# Patient Record
Sex: MALE | Race: BLACK OR AFRICAN AMERICAN | NOT HISPANIC OR LATINO | Employment: FULL TIME | URBAN - METROPOLITAN AREA
[De-identification: names, ages, dates, MRNs, and addresses within clinical notes are randomized per-mention and may not be internally consistent; named-entity substitution may affect disease eponyms.]

---

## 2019-10-06 ENCOUNTER — HOSPITAL ENCOUNTER (EMERGENCY)
Facility: HOSPITAL | Age: 54
Discharge: HOME | End: 2019-10-06
Attending: EMERGENCY MEDICINE

## 2019-10-06 VITALS
HEART RATE: 80 BPM | TEMPERATURE: 98 F | WEIGHT: 185 LBS | HEIGHT: 70 IN | SYSTOLIC BLOOD PRESSURE: 170 MMHG | OXYGEN SATURATION: 97 % | RESPIRATION RATE: 19 BRPM | DIASTOLIC BLOOD PRESSURE: 87 MMHG | BODY MASS INDEX: 26.48 KG/M2

## 2019-10-06 DIAGNOSIS — M25.561 RIGHT KNEE PAIN: ICD-10-CM

## 2019-10-06 DIAGNOSIS — S89.91XA INJURY OF RIGHT KNEE, INITIAL ENCOUNTER: Primary | ICD-10-CM

## 2019-10-06 PROCEDURE — 99284 PR EMERGENCY DEPT VISIT,LEVEL IV: ICD-10-PCS | Mod: ,,, | Performed by: PHYSICIAN ASSISTANT

## 2019-10-06 PROCEDURE — 99284 EMERGENCY DEPT VISIT MOD MDM: CPT | Mod: ,,, | Performed by: PHYSICIAN ASSISTANT

## 2019-10-06 PROCEDURE — 25000003 PHARM REV CODE 250: Performed by: PHYSICIAN ASSISTANT

## 2019-10-06 PROCEDURE — 99283 EMERGENCY DEPT VISIT LOW MDM: CPT | Mod: 25

## 2019-10-06 RX ORDER — ACETAMINOPHEN 500 MG
1000 TABLET ORAL
Status: COMPLETED | OUTPATIENT
Start: 2019-10-06 | End: 2019-10-06

## 2019-10-06 RX ORDER — NAPROXEN 500 MG/1
500 TABLET ORAL 2 TIMES DAILY WITH MEALS
Qty: 20 TABLET | Refills: 0 | Status: SHIPPED | OUTPATIENT
Start: 2019-10-06 | End: 2022-08-18

## 2019-10-06 RX ORDER — IBUPROFEN 600 MG/1
600 TABLET ORAL
Status: COMPLETED | OUTPATIENT
Start: 2019-10-06 | End: 2019-10-06

## 2019-10-06 RX ADMIN — ACETAMINOPHEN 1000 MG: 500 TABLET ORAL at 09:10

## 2019-10-06 RX ADMIN — IBUPROFEN 600 MG: 600 TABLET, FILM COATED ORAL at 10:10

## 2019-10-07 NOTE — DISCHARGE INSTRUCTIONS
Use your crutches for ambulation assistance. Do not place weight on your leg until you are able to see Orthopedics  Use the below contact information to establish care  Take the prescribed Naprosyn for pain  Return to the emergency room for new, worsening, or concerning symptoms
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
## 2022-05-05 ENCOUNTER — TELEPHONE (OUTPATIENT)
Dept: INTERNAL MEDICINE | Facility: CLINIC | Age: 57
End: 2022-05-05
Payer: MEDICAID

## 2022-06-06 ENCOUNTER — OFFICE VISIT (OUTPATIENT)
Dept: INTERNAL MEDICINE | Facility: CLINIC | Age: 57
End: 2022-06-06
Payer: MEDICAID

## 2022-06-06 VITALS
WEIGHT: 209 LBS | TEMPERATURE: 98 F | HEART RATE: 111 BPM | BODY MASS INDEX: 29.92 KG/M2 | SYSTOLIC BLOOD PRESSURE: 138 MMHG | DIASTOLIC BLOOD PRESSURE: 86 MMHG | RESPIRATION RATE: 16 BRPM | HEIGHT: 70 IN

## 2022-06-06 DIAGNOSIS — F19.90 ILLICIT DRUG USE: ICD-10-CM

## 2022-06-06 DIAGNOSIS — I10 PRIMARY HYPERTENSION: Primary | ICD-10-CM

## 2022-06-06 DIAGNOSIS — Z12.5 PROSTATE CANCER SCREENING: ICD-10-CM

## 2022-06-06 DIAGNOSIS — R73.03 PREDIABETES: ICD-10-CM

## 2022-06-06 DIAGNOSIS — F17.210 CIGARETTE NICOTINE DEPENDENCE WITHOUT COMPLICATION: ICD-10-CM

## 2022-06-06 DIAGNOSIS — Z00.00 WELLNESS EXAMINATION: ICD-10-CM

## 2022-06-06 DIAGNOSIS — E55.9 VITAMIN D DEFICIENCY: ICD-10-CM

## 2022-06-06 PROBLEM — E66.9 OBESITY: Chronic | Status: ACTIVE | Noted: 2022-06-06

## 2022-06-06 PROBLEM — E66.9 OBESITY: Status: ACTIVE | Noted: 2022-06-06

## 2022-06-06 PROCEDURE — 99406 PR TOBACCO USE CESSATION INTERMEDIATE 3-10 MINUTES: ICD-10-PCS | Mod: S$PBB,,, | Performed by: NURSE PRACTITIONER

## 2022-06-06 PROCEDURE — 99204 OFFICE O/P NEW MOD 45 MIN: CPT | Mod: S$PBB,25,, | Performed by: NURSE PRACTITIONER

## 2022-06-06 PROCEDURE — 99214 OFFICE O/P EST MOD 30 MIN: CPT | Mod: PBBFAC | Performed by: NURSE PRACTITIONER

## 2022-06-06 PROCEDURE — 99406 BEHAV CHNG SMOKING 3-10 MIN: CPT | Mod: S$PBB,,, | Performed by: NURSE PRACTITIONER

## 2022-06-06 PROCEDURE — 99204 PR OFFICE/OUTPT VISIT, NEW, LEVL IV, 45-59 MIN: ICD-10-PCS | Mod: S$PBB,25,, | Performed by: NURSE PRACTITIONER

## 2022-06-06 RX ORDER — LISINOPRIL 20 MG/1
20 TABLET ORAL EVERY MORNING
COMMUNITY
Start: 2022-04-29 | End: 2022-06-06 | Stop reason: SDUPTHER

## 2022-06-06 RX ORDER — LISINOPRIL 20 MG/1
20 TABLET ORAL EVERY MORNING
Qty: 90 TABLET | Refills: 3 | Status: SHIPPED | OUTPATIENT
Start: 2022-06-06 | End: 2022-12-05 | Stop reason: SDUPTHER

## 2022-06-06 NOTE — PROGRESS NOTES
GENTRY Westbrook   [facility]  [address] [facility] [address]      PATIENT NAME: Xavier Harmon  : 1965  DATE: 22  MRN: [id number]      Billing Provider: GENTRY Westbrook  Level of Service:   Patient PCP Information     Provider PCP Type    GENTRY Westbrook [facility]          Reason for Visit / Chief Complaint: Follow-up (Out of med x 2 weeks)       History of Present Illness / Problem Focused Workflow     Xavier Harmon presents to the clinic with Follow-up (Out of med x 2 weeks)     55 yo male here today for f/u for lab review and BP check, LOV 22. PMH HTN, illicit drug use, Tobacco use (1/2 ppd x 30+ years).  Pt reports today he was unaware he was supposed to go back to the pharmacy to  his RX, has not had meds in about 1 -2 weeks, BP okay today, will restart meds today. Pt labs reviewed with no questions or concerns at this time, pt had concerns at our LOV regarding cysts on his back but reports today he will still wait until he has insurance to follow up for it. Denies any other concerns today, agreeable to 1 yr f/u.     Follow-up        Review of Systems     Review of Systems   Constitutional: Negative.    HENT: Negative.    Eyes: Negative.    Respiratory: Negative.    Cardiovascular: Negative.    Gastrointestinal: Negative.    Endocrine: Negative.    Genitourinary: Negative.    Neurological: Negative.    Psychiatric/Behavioral: Negative.        Medical / Social / Family History     Past Medical History:   Diagnosis Date    HTN (hypertension)     Obesity, unspecified     Tobacco abuse        History reviewed. No pertinent surgical history.    Social History    reports that he has been smoking cigarettes. He started smoking about 46 years ago. He has never used smokeless tobacco. He reports current alcohol use. He reports that he does not use drugs.    Family History  's family history includes Diabetes in  his mother; Hypertension in his mother; Stroke in his father.    Medications and Allergies     Medications  Medication List with Changes/Refills   Current Medications    NAPROXEN (NAPROSYN) 500 MG TABLET    Take 1 tablet (500 mg total) by mouth 2 (two) times daily with meals.   Changed and/or Refilled Medications    Modified Medication Previous Medication    LISINOPRIL (PRINIVIL,ZESTRIL) 20 MG TABLET lisinopriL (PRINIVIL,ZESTRIL) 20 MG tablet       Take 1 tablet (20 mg total) by mouth every morning. For High Blood Pressure    Take 20 mg by mouth every morning.         Allergies  Review of patient's allergies indicates:  No Known Allergies    Physical Examination     Vitals:    06/06/22 1303   BP: 138/86   Pulse: (!) 111   Resp: 16   Temp: 98.3 °F (36.8 °C)     Physical Exam  Vitals reviewed.   Constitutional:       Appearance: Normal appearance. He is normal weight.   HENT:      Head: Normocephalic.   Cardiovascular:      Rate and Rhythm: Normal rate and regular rhythm.      Pulses: Normal pulses.      Heart sounds: Normal heart sounds.   Pulmonary:      Effort: Pulmonary effort is normal.      Breath sounds: Normal breath sounds.   Abdominal:      General: Abdomen is flat.      Palpations: Abdomen is soft.   Musculoskeletal:         General: Normal range of motion.      Cervical back: Normal range of motion.   Skin:     General: Skin is warm and dry.   Neurological:      Mental Status: He is alert.   Psychiatric:         Mood and Affect: Mood normal.           Results     Lab Results   Component Value Date    WBC 7.8 07/08/2018    RBC 5.64 07/08/2018    HGB 12.8 (L) 07/08/2018    HCT 39.6 (L) 07/08/2018    MCV 70.2 (L) 07/08/2018    MCH 22.6 (L) 07/08/2018    MCHC 32.3 (L) 07/08/2018    RDW 16.0 07/08/2018     07/08/2018    MPV 9.0 07/08/2018     Sodium Level   Date Value Ref Range Status   07/08/2018 137 136 - 145 mmol/L Final     Potassium Level   Date Value Ref Range Status   07/08/2018 3.0 (L) 3.5 - 5.1  mmol/L Final     Carbon Dioxide   Date Value Ref Range Status   07/08/2018 25.6 21.0 - 32.0 mmol/L Final     Blood Urea Nitrogen   Date Value Ref Range Status   07/08/2018 9.8 7.0 - 18.0 mg/dL Final     Creatinine   Date Value Ref Range Status   07/08/2018 0.98 0.60 - 1.30 mg/dL Final     Calcium Level Total   Date Value Ref Range Status   07/08/2018 8.3 (L) 8.5 - 10.1 mg/dL Final     Albumin Level   Date Value Ref Range Status   07/08/2018 3.30 (L) 3.40 - 5.00 gm/dL Final     Bilirubin Total   Date Value Ref Range Status   07/08/2018 0.4 0.2 - 1.0 mg/dL Final     Alkaline Phosphatase   Date Value Ref Range Status   07/08/2018 115 46 - 116 unit/L Final     Aspartate Aminotransferase   Date Value Ref Range Status   07/08/2018 438 (H) 15 - 37 unit/L Final     Alanine Aminotransferase   Date Value Ref Range Status   07/08/2018 587 (H) 12 - 78 unit/L Final     Estimated GFR-Non    Date Value Ref Range Status   07/08/2018 >60 mL/min/1.73 m2 Final     No results found for: CHOL  No results found for: HDL  No results found for: LDLCALC  No results found for: TRIG  No results found for: CHOLHDL  No results found for: TSH  Lab Results   Component Value Date    PHUR 7.0 01/22/2021    PROTEINUA Trace (A) 01/22/2021    GLUCUA Negative 01/22/2021    KETONESU Trace (A) 01/22/2021    OCCULTUA Negative 01/22/2021    NITRITE Negative 01/22/2021    LEUKOCYTESUR Trace (A) 01/22/2021     No results found for: HGBA1C  No results found for: MICROALBUR, [id number]   No results found for this or any previous visit.         Assessment and Plan (including Health Maintenance)     Plan:         Health Maintenance Due   Topic Date Due    Hepatitis C Screening  Never done    Lipid Panel  Never done    Pneumococcal Vaccines (Age 0-64) (1 - PCV) Never done    HIV Screening  Never done    TETANUS VACCINE  Never done    Colorectal Cancer Screening  Never done    Shingles Vaccine (1 of 2) Never done    COVID-19 Vaccine (2 -  Moderna series) 09/19/2021       Problem List Items Addressed This Visit        Psychiatric    Illicit drug use       Cardiac/Vascular    Primary hypertension - Primary (Chronic)    Current Assessment & Plan     Continue medications as prescribed  Low Sodium Diet (Dash Diet - less than 2 grams of sodium per day).  Maintain healthy weight with goal BMI <30. Exercise 30 minutes per day 5 days per week.             Relevant Medications    lisinopriL (PRINIVIL,ZESTRIL) 20 MG tablet       Endocrine    Prediabetes    Current Assessment & Plan     Follow ADA diet.  Avoid soda, simple sweets, and limit rice/pasta/bread/starches and consume brown options when possible.   Maintain healthy weight with BMI goal <30.   Perform aerobic exercise for 150 minutes per week (or 5 days a week for 30 minutes each day).                 Other    Cigarette nicotine dependence without complication (Chronic)    Current Assessment & Plan     Smoking cessation discussed > 3 mins   Pt not ready to quit.  Discussed benefits of quitting including improved health, decreased cardiac/vascular/pulmonary/stroke risks as well as saving money.             Relevant Orders    Ambulatory referral/consult to Smoking Cessation Program      Other Visit Diagnoses     Wellness examination        Relevant Orders    CBC Auto Differential    Comprehensive Metabolic Panel    Lipid Panel    TSH    T4, Free    Vitamin D    Urinalysis, Reflex to Urine Culture Urine, Clean Catch    Hemoglobin A1C    Prostate cancer screening        Relevant Orders    PSA, Screening    Vitamin D deficiency        Relevant Orders    Vitamin D          Health Maintenance Topics with due status: Not Due       Topic Last Completion Date    Influenza Vaccine Not Due       No future appointments.         Signature:     [facility]  [address] [facility] [id number]    Date of encounter: 6/6/22

## 2022-06-06 NOTE — ASSESSMENT & PLAN NOTE
Continue medications as prescribed  Low Sodium Diet (Dash Diet - less than 2 grams of sodium per day).  Maintain healthy weight with goal BMI <30. Exercise 30 minutes per day 5 days per week.

## 2022-06-06 NOTE — ASSESSMENT & PLAN NOTE
Follow ADA diet.  Avoid soda, simple sweets, and limit rice/pasta/bread/starches and consume brown options when possible.   Maintain healthy weight with BMI goal <30.   Perform aerobic exercise for 150 minutes per week (or 5 days a week for 30 minutes each day).

## 2022-06-06 NOTE — ASSESSMENT & PLAN NOTE
Smoking cessation discussed > 3 mins   Pt not ready to quit.  Discussed benefits of quitting including improved health, decreased cardiac/vascular/pulmonary/stroke risks as well as saving money.

## 2022-07-11 ENCOUNTER — TELEPHONE (OUTPATIENT)
Dept: SMOKING CESSATION | Facility: CLINIC | Age: 57
End: 2022-07-11

## 2022-07-28 LAB
LEFT EYE DM RETINOPATHY: NEGATIVE
RIGHT EYE DM RETINOPATHY: NEGATIVE

## 2022-08-04 ENCOUNTER — TELEPHONE (OUTPATIENT)
Dept: SMOKING CESSATION | Facility: CLINIC | Age: 57
End: 2022-08-04

## 2022-08-04 NOTE — TELEPHONE ENCOUNTER
Pt had not shown up for his [facility] appointment.  Called pt.  Pt will not be able to make it today.  Pt stated that he will call back to reschedule.

## 2022-08-18 ENCOUNTER — HOSPITAL ENCOUNTER (OUTPATIENT)
Dept: RADIOLOGY | Facility: HOSPITAL | Age: 57
Discharge: HOME | End: 2022-08-18
Attending: NURSE PRACTITIONER
Payer: MEDICAID

## 2022-08-18 ENCOUNTER — OFFICE VISIT (OUTPATIENT)
Dept: INTERNAL MEDICINE | Facility: CLINIC | Age: 57
End: 2022-08-18
Payer: MEDICAID

## 2022-08-18 VITALS
HEART RATE: 79 BPM | SYSTOLIC BLOOD PRESSURE: 134 MMHG | HEIGHT: 70 IN | TEMPERATURE: 98 F | WEIGHT: 196 LBS | BODY MASS INDEX: 28.06 KG/M2 | DIASTOLIC BLOOD PRESSURE: 87 MMHG

## 2022-08-18 DIAGNOSIS — F17.210 CIGARETTE NICOTINE DEPENDENCE WITHOUT COMPLICATION: ICD-10-CM

## 2022-08-18 DIAGNOSIS — R76.8 HEPATITIS C ANTIBODY TEST POSITIVE: Primary | ICD-10-CM

## 2022-08-18 DIAGNOSIS — M25.561 CHRONIC PAIN OF RIGHT KNEE: ICD-10-CM

## 2022-08-18 DIAGNOSIS — G89.29 CHRONIC PAIN OF RIGHT KNEE: ICD-10-CM

## 2022-08-18 DIAGNOSIS — Z23 IMMUNIZATION DUE: ICD-10-CM

## 2022-08-18 DIAGNOSIS — N48.89 PENILE SWELLING: ICD-10-CM

## 2022-08-18 DIAGNOSIS — R73.03 PREDIABETES: ICD-10-CM

## 2022-08-18 DIAGNOSIS — R59.1 LYMPHADENOPATHY: ICD-10-CM

## 2022-08-18 DIAGNOSIS — Z12.11 COLON CANCER SCREENING: ICD-10-CM

## 2022-08-18 DIAGNOSIS — E55.9 VITAMIN D DEFICIENCY: Chronic | ICD-10-CM

## 2022-08-18 DIAGNOSIS — R22.2 MASS OF SKIN OF BACK: ICD-10-CM

## 2022-08-18 PROCEDURE — 73564 X-RAY EXAM KNEE 4 OR MORE: CPT | Mod: TC,RT

## 2022-08-18 PROCEDURE — 3008F PR BODY MASS INDEX (BMI) DOCUMENTED: ICD-10-PCS | Mod: CPTII,,, | Performed by: NURSE PRACTITIONER

## 2022-08-18 PROCEDURE — 1159F MED LIST DOCD IN RCRD: CPT | Mod: CPTII,,, | Performed by: NURSE PRACTITIONER

## 2022-08-18 PROCEDURE — 3079F DIAST BP 80-89 MM HG: CPT | Mod: CPTII,,, | Performed by: NURSE PRACTITIONER

## 2022-08-18 PROCEDURE — 3008F BODY MASS INDEX DOCD: CPT | Mod: CPTII,,, | Performed by: NURSE PRACTITIONER

## 2022-08-18 PROCEDURE — 99214 OFFICE O/P EST MOD 30 MIN: CPT | Mod: PBBFAC | Performed by: NURSE PRACTITIONER

## 2022-08-18 PROCEDURE — 3075F PR MOST RECENT SYSTOLIC BLOOD PRESS GE 130-139MM HG: ICD-10-PCS | Mod: CPTII,,, | Performed by: NURSE PRACTITIONER

## 2022-08-18 PROCEDURE — 1160F PR REVIEW ALL MEDS BY PRESCRIBER/CLIN PHARMACIST DOCUMENTED: ICD-10-PCS | Mod: CPTII,,, | Performed by: NURSE PRACTITIONER

## 2022-08-18 PROCEDURE — 90471 IMMUNIZATION ADMIN: CPT | Mod: PBBFAC

## 2022-08-18 PROCEDURE — 90715 TDAP VACCINE 7 YRS/> IM: CPT | Mod: PBBFAC

## 2022-08-18 PROCEDURE — 99215 OFFICE O/P EST HI 40 MIN: CPT | Mod: S$PBB,,, | Performed by: NURSE PRACTITIONER

## 2022-08-18 PROCEDURE — 99215 PR OFFICE/OUTPT VISIT, EST, LEVL V, 40-54 MIN: ICD-10-PCS | Mod: S$PBB,,, | Performed by: NURSE PRACTITIONER

## 2022-08-18 PROCEDURE — 1160F RVW MEDS BY RX/DR IN RCRD: CPT | Mod: CPTII,,, | Performed by: NURSE PRACTITIONER

## 2022-08-18 PROCEDURE — 3079F PR MOST RECENT DIASTOLIC BLOOD PRESSURE 80-89 MM HG: ICD-10-PCS | Mod: CPTII,,, | Performed by: NURSE PRACTITIONER

## 2022-08-18 PROCEDURE — 4010F ACE/ARB THERAPY RXD/TAKEN: CPT | Mod: CPTII,,, | Performed by: NURSE PRACTITIONER

## 2022-08-18 PROCEDURE — 3075F SYST BP GE 130 - 139MM HG: CPT | Mod: CPTII,,, | Performed by: NURSE PRACTITIONER

## 2022-08-18 PROCEDURE — 1159F PR MEDICATION LIST DOCUMENTED IN MEDICAL RECORD: ICD-10-PCS | Mod: CPTII,,, | Performed by: NURSE PRACTITIONER

## 2022-08-18 PROCEDURE — 4010F PR ACE/ARB THEARPY RXD/TAKEN: ICD-10-PCS | Mod: CPTII,,, | Performed by: NURSE PRACTITIONER

## 2022-08-18 RX ORDER — PREDNISONE 20 MG/1
20 TABLET ORAL 2 TIMES DAILY
Qty: 10 TABLET | Refills: 0 | Status: SHIPPED | OUTPATIENT
Start: 2022-08-18 | End: 2022-08-23

## 2022-08-18 RX ORDER — DICLOFENAC SODIUM 10 MG/G
2 GEL TOPICAL 4 TIMES DAILY
Qty: 100 G | Refills: 6 | Status: SHIPPED | OUTPATIENT
Start: 2022-08-18 | End: 2023-01-11 | Stop reason: SDUPTHER

## 2022-08-18 RX ADMIN — TETANUS TOXOID, REDUCED DIPHTHERIA TOXOID AND ACELLULAR PERTUSSIS VACCINE, ADSORBED 0.5 ML: 5; 2.5; 8; 8; 2.5 SUSPENSION INTRAMUSCULAR at 08:08

## 2022-08-18 NOTE — ASSESSMENT & PLAN NOTE
Smoking cessation discussed > 3 mins  LC Screening Ordered  Pt not ready to quit.  Discussed benefits of quitting including improved health, decreased cardiac/vascular/pulmonary/stroke risks as well as saving money.

## 2022-08-18 NOTE — ASSESSMENT & PLAN NOTE
Right Knee X-ray Today   RX Voltaren Gel prn   Perform knee stretches daily  Exercise as tolerated (low impact)  Avoid activities that increase the pain or cause stiffness.  Apply heating pad, ice pack, OTC topical as needed.  Massage to loosen muscles.  Continues NSAID as needed  F/U if worsening

## 2022-08-18 NOTE — PROGRESS NOTES
GENTRY Westbrook   [facility]  [address] [facility] [address]      PATIENT NAME: Xavier Harmon  : 1965  DATE: 22  MRN: [id number]      Billing Provider: GENTRY Westbrook  Level of Service: CO OFFICE/OUTPT VISIT, EST, LEVL V, 40-54 MIN  Patient PCP Information     Provider PCP Type    GENTRY Westbrook [facility]          Reason for Visit / Chief Complaint: Follow-up (HTN)       History of Present Illness / Problem Focused Workflow     Xavier Harmon presents to the clinic with Follow-up (HTN)     57 yo male here today for f/u. PMH HTN and Tobacco use (1/2 ppd), 30 ppy hx).    Prostate Cancer Screening - 22 PSA, Follow up Annually  Colon Cancer Screening - 22 FIT Test  Osteoporosis Screening - 22 Vitamin D level  STI Screening -     22  Pt reports today he has never really had a PCP, reports he is aware of his elevated BP, meds on chart were only for patient to get clearance for a job, has not been taking due to he ran out. Discussed SE of uncontrolled BP, he reports that he does get some blurry vision every now and then but overall feels good today, agreeable to restarting meds, will  and start today; pt agreeable to visit tomorrow to check BP and f/u on labs.  c/o Ankle / Wrist Burning - reports tops of his wrist and bilateral ankles are burning for about 6 months - no alleviating or aggravating factors.  C/O  bilateral Knee Swelling > 1 year, reports was previously working and would have issues, but reports since he has not been working the pain is still there, hurts intermittently, worse when the weather changes, reports with some swelling as well, uses OTC Cream as needed but no oral meds. Agreeable to bilateral knee x-rays once insurance is available.    22   Pt reports today for evaluation of cysts on his back since he has insurance now, US ordered and will refer to surgery clinic pending  results. He also was seen at [facility] in [place] for swelling in his neck twice (1 month ago), was given antibiotics for treatment, area is  and swollen, agreeable to soft tissue US order today. Reports his penis was swollen 3-4 days ago, but has gone down, this has occurred a few times, agreeable to US of scrotum. Pt with a positive HCV AB back in April, will run HCV RNA today. Pt reports with right knee pain that comes and goes out of now where, sometimes occurs at night time, has been going on for greater than 1 year, the left knee does not bother him, reports not taking any medicine for his knees at this time, is agreeable to right knee x-rays today. Pt also agreeable to Shingles & TDAP today, also Cologuard screening and LC Screening.  Will f/u next Friday via audio virtual for lab and diagnostic review.       Follow-up  Rash: us soft tiss.       Review of Systems     Review of Systems   Constitutional: Negative.    HENT: Negative.    Eyes: Negative.    Respiratory: Negative.    Cardiovascular: Negative.    Gastrointestinal: Negative.    Endocrine: Negative.    Genitourinary: Negative.    Skin: Rash: us soft tiss.   Neurological: Negative.    Psychiatric/Behavioral: Negative.        Medical / Social / Family History     Past Medical History:   Diagnosis Date    HTN (hypertension)     Obesity, unspecified     Tobacco abuse        History reviewed. No pertinent surgical history.    Social History    reports that he has been smoking cigarettes. He started smoking about 47 years ago. He has never used smokeless tobacco. He reports current alcohol use. He reports that he does not use drugs.    Family History  's family history includes Diabetes in his mother; Hypertension in his mother; Stroke in his father.    Medications and Allergies     Medications  Medication List with Changes/Refills   New Medications    DICLOFENAC SODIUM (VOLTAREN) 1 % GEL    Apply 2 g topically 4 (four) times daily. To right  knee.    PREDNISONE (DELTASONE) 20 MG TABLET    Take 1 tablet (20 mg total) by mouth 2 (two) times daily. for 5 days   Current Medications    LISINOPRIL (PRINIVIL,ZESTRIL) 20 MG TABLET    Take 1 tablet (20 mg total) by mouth every morning. For High Blood Pressure   Discontinued Medications    NAPROXEN (NAPROSYN) 500 MG TABLET    Take 1 tablet (500 mg total) by mouth 2 (two) times daily with meals.         Allergies  Review of patient's allergies indicates:  No Known Allergies    Physical Examination     Vitals:    08/18/22 0824   BP: 134/87   Pulse: 79   Temp: 98.3 °F (36.8 °C)     Physical Exam  HENT:      Right Ear: Hearing normal.      Left Ear: Hearing normal.   Neurological:      Mental Status: He is alert and oriented to person, place, and time.   Psychiatric:         Mood and Affect: Mood normal.           Results     Lab Results   Component Value Date    WBC 12.9 (H) 08/16/2022    RBC 6.89 (H) 08/16/2022    HGB 16.0 08/16/2022    HCT 49.2 08/16/2022    MCV 71.4 (L) 08/16/2022    MCH 23.2 (L) 08/16/2022    MCHC 32.5 (L) 08/16/2022    RDW 17.4 (H) 08/16/2022     08/16/2022    MPV 10.9 (H) 08/16/2022     Sodium Level   Date Value Ref Range Status   08/16/2022 141 136 - 145 mmol/L Final     Potassium Level   Date Value Ref Range Status   08/16/2022 3.8 3.5 - 5.1 mmol/L Final     Carbon Dioxide   Date Value Ref Range Status   08/16/2022 29 22 - 29 mmol/L Final     Blood Urea Nitrogen   Date Value Ref Range Status   08/16/2022 13.6 8.4 - 25.7 mg/dL Final     Creatinine   Date Value Ref Range Status   08/16/2022 0.77 0.73 - 1.18 mg/dL Final     Calcium Level Total   Date Value Ref Range Status   08/16/2022 9.8 8.4 - 10.2 mg/dL Final     Albumin Level   Date Value Ref Range Status   08/16/2022 3.1 (L) 3.5 - 5.0 gm/dL Final     Bilirubin Total   Date Value Ref Range Status   08/16/2022 1.0 <=1.5 mg/dL Final     Alkaline Phosphatase   Date Value Ref Range Status   08/16/2022 153 (H) 40 - 150 unit/L Final      Aspartate Aminotransferase   Date Value Ref Range Status   08/16/2022 71 (H) 5 - 34 unit/L Final     Alanine Aminotransferase   Date Value Ref Range Status   08/16/2022 94 (H) 0 - 55 unit/L Final     Estimated GFR-Non    Date Value Ref Range Status   07/08/2018 >60 mL/min/1.73 m2 Final     Lab Results   Component Value Date    CHOL 177 08/16/2022     Lab Results   Component Value Date    HDL 61 (H) 08/16/2022     No results found for: LDLCALC  Lab Results   Component Value Date    TRIG 108 08/16/2022     No results found for: CHOLHDL  No results found for: TSH  Lab Results   Component Value Date    PHUR 7.0 01/22/2021    PROTEINUA Trace (A) 01/22/2021    GLUCUA Negative 01/22/2021    KETONESU Trace (A) 01/22/2021    OCCULTUA Negative 01/22/2021    NITRITE Negative 01/22/2021    LEUKOCYTESUR Trace (A) 01/22/2021     Lab Results   Component Value Date    HGBA1C 6.1 08/16/2022     No results found for: MICROALBUR, [id number]   No results found for this or any previous visit.         Assessment and Plan (including Health Maintenance)     Plan:         Health Maintenance Due   Topic Date Due    Hepatitis C Screening  Never done    Pneumococcal Vaccines (Age 0-64) (1 - PCV) Never done    HIV Screening  Never done    Colorectal Cancer Screening  Never done    COVID-19 Vaccine (2 - Moderna series) 09/19/2021       Problem List Items Addressed This Visit        Renal/    Penile swelling    Current Assessment & Plan     US Scrotum ordered today            Relevant Orders    US Scrotum And Testicles       Endocrine    Vitamin D deficiency (Chronic)    Current Assessment & Plan     Educated on increasing foods high in Vitamin D such as fish oil, cod liver oil, salmon, milk fortified with vitamin D.  Vitamin D 2000 I.U. tablets daily (purchase over the counter).  Repeat Vitamin D level as ordered.             Prediabetes    Current Assessment & Plan     Follow ADA diet.  Avoid soda, simple sweets, and  limit rice/pasta/bread/starches and consume brown options when possible.   Maintain healthy weight with BMI goal <30.   Perform aerobic exercise for 150 minutes per week (or 5 days a week for 30 minutes each day).   Examine feet daily.                 GI    Hepatitis C antibody test positive - Primary    Current Assessment & Plan     HCV RNA Today            Relevant Orders    Hepatitis C Viral(HCV) RNA, Quant Real-Time PCR w/Reflexs       Orthopedic    Chronic pain of right knee    Current Assessment & Plan     Right Knee X-ray Today   RX Voltaren Gel prn   Perform knee stretches daily  Exercise as tolerated (low impact)  Avoid activities that increase the pain or cause stiffness.  Apply heating pad, ice pack, OTC topical as needed.  Massage to loosen muscles.  Continues NSAID as needed  F/U if worsening              Relevant Medications    diclofenac sodium (VOLTAREN) 1 % Gel    Other Relevant Orders    X-Ray Knee Complete 4 Or More Views Right       Other    Cigarette nicotine dependence without complication (Chronic)    Current Assessment & Plan     Smoking cessation discussed > 3 mins  LC Screening Ordered  Pt not ready to quit.  Discussed benefits of quitting including improved health, decreased cardiac/vascular/pulmonary/stroke risks as well as saving money.             Relevant Orders    CT Chest Lung Screening Low Dose    Lymphadenopathy    Current Assessment & Plan     US Soft Tissue Neck ordered           Relevant Medications    predniSONE (DELTASONE) 20 MG tablet    Other Relevant Orders    US Soft Tissue Head Neck Thyroid    Mass of skin of back    Current Assessment & Plan     US Soft Tissue Back today            Relevant Orders    US Soft Tissue Lower Back      Other Visit Diagnoses     Immunization due        Relevant Medications    Tdap (BOOSTRIX) vaccine injection 0.5 mL (Completed)    varicella-zoster [id number] (PF)(SHINGRIX) 50 mcg/0.5 mL injection (Completed)    Colon cancer screening         Relevant Orders    Cologuard Screening (Multitarget Stool DNA)          Health Maintenance Topics with due status: Not Due       Topic Last Completion Date    Lipid Panel 08/16/2022    TETANUS VACCINE 08/18/2022    Shingles Vaccine 08/18/2022    Influenza Vaccine Not Due       Future Appointments   Date Time Provider Department Center   8/23/2022  8:00 AM [address] [place]   8/23/2022  8:30 AM [address] [place]   8/23/2022  9:30 AM [address] [place]            Signature:     [facility]  [address] [facility] [address]    Date of encounter: 8/18/22

## 2022-08-18 NOTE — ASSESSMENT & PLAN NOTE
Follow ADA diet.  Avoid soda, simple sweets, and limit rice/pasta/bread/starches and consume brown options when possible.   Maintain healthy weight with BMI goal <30.   Perform aerobic exercise for 150 minutes per week (or 5 days a week for 30 minutes each day).   Examine feet daily.

## 2022-08-23 ENCOUNTER — HOSPITAL ENCOUNTER (OUTPATIENT)
Dept: RADIOLOGY | Facility: HOSPITAL | Age: 57
Discharge: HOME | End: 2022-08-23
Attending: NURSE PRACTITIONER
Payer: MEDICAID

## 2022-08-23 DIAGNOSIS — N48.89 PENILE SWELLING: ICD-10-CM

## 2022-08-23 DIAGNOSIS — R22.2 MASS OF SKIN OF BACK: ICD-10-CM

## 2022-08-23 DIAGNOSIS — R59.1 LYMPHADENOPATHY: ICD-10-CM

## 2022-08-23 PROCEDURE — 76870 US EXAM SCROTUM: CPT | Mod: TC

## 2022-08-23 PROCEDURE — 76705 ECHO EXAM OF ABDOMEN: CPT | Mod: TC

## 2022-08-23 PROCEDURE — 76536 US EXAM OF HEAD AND NECK: CPT | Mod: TC

## 2022-08-26 ENCOUNTER — HOSPITAL ENCOUNTER (OUTPATIENT)
Dept: RADIOLOGY | Facility: HOSPITAL | Age: 57
Discharge: HOME | End: 2022-08-26
Attending: NURSE PRACTITIONER
Payer: MEDICAID

## 2022-08-26 ENCOUNTER — OFFICE VISIT (OUTPATIENT)
Dept: INTERNAL MEDICINE | Facility: CLINIC | Age: 57
End: 2022-08-26
Payer: MEDICAID

## 2022-08-26 DIAGNOSIS — M17.11 PRIMARY OSTEOARTHRITIS OF RIGHT KNEE: ICD-10-CM

## 2022-08-26 DIAGNOSIS — F17.210 CIGARETTE NICOTINE DEPENDENCE WITHOUT COMPLICATION: ICD-10-CM

## 2022-08-26 DIAGNOSIS — I10 PRIMARY HYPERTENSION: ICD-10-CM

## 2022-08-26 DIAGNOSIS — F17.210 CIGARETTE NICOTINE DEPENDENCE WITHOUT COMPLICATION: Chronic | ICD-10-CM

## 2022-08-26 DIAGNOSIS — B19.20 HEPATITIS C VIRUS INFECTION WITHOUT HEPATIC COMA, UNSPECIFIED CHRONICITY: ICD-10-CM

## 2022-08-26 DIAGNOSIS — R79.89 ELEVATED LFTS: ICD-10-CM

## 2022-08-26 DIAGNOSIS — L72.3 SEBACEOUS CYST: Primary | ICD-10-CM

## 2022-08-26 DIAGNOSIS — D17.1 LIPOMA OF BACK: ICD-10-CM

## 2022-08-26 PROCEDURE — 99406 BEHAV CHNG SMOKING 3-10 MIN: CPT | Mod: 95,,, | Performed by: NURSE PRACTITIONER

## 2022-08-26 PROCEDURE — 1159F PR MEDICATION LIST DOCUMENTED IN MEDICAL RECORD: ICD-10-PCS | Mod: CPTII,95,, | Performed by: NURSE PRACTITIONER

## 2022-08-26 PROCEDURE — 4010F ACE/ARB THERAPY RXD/TAKEN: CPT | Mod: CPTII,95,, | Performed by: NURSE PRACTITIONER

## 2022-08-26 PROCEDURE — 71271 CT THORAX LUNG CANCER SCR C-: CPT | Mod: TC

## 2022-08-26 PROCEDURE — 1160F PR REVIEW ALL MEDS BY PRESCRIBER/CLIN PHARMACIST DOCUMENTED: ICD-10-PCS | Mod: CPTII,95,, | Performed by: NURSE PRACTITIONER

## 2022-08-26 PROCEDURE — 99406 PR TOBACCO USE CESSATION INTERMEDIATE 3-10 MINUTES: ICD-10-PCS | Mod: 95,,, | Performed by: NURSE PRACTITIONER

## 2022-08-26 PROCEDURE — 99214 PR OFFICE/OUTPT VISIT, EST, LEVL IV, 30-39 MIN: ICD-10-PCS | Mod: 95,25,, | Performed by: NURSE PRACTITIONER

## 2022-08-26 PROCEDURE — 1160F RVW MEDS BY RX/DR IN RCRD: CPT | Mod: CPTII,95,, | Performed by: NURSE PRACTITIONER

## 2022-08-26 PROCEDURE — 1159F MED LIST DOCD IN RCRD: CPT | Mod: CPTII,95,, | Performed by: NURSE PRACTITIONER

## 2022-08-26 PROCEDURE — 4010F PR ACE/ARB THEARPY RXD/TAKEN: ICD-10-PCS | Mod: CPTII,95,, | Performed by: NURSE PRACTITIONER

## 2022-08-26 PROCEDURE — 99214 OFFICE O/P EST MOD 30 MIN: CPT | Mod: 95,25,, | Performed by: NURSE PRACTITIONER

## 2022-08-26 NOTE — ASSESSMENT & PLAN NOTE
Knee X-rays on Chart  Referral to [facility]  Perform knee stretches daily  Exercise as tolerated (low impact)  Avoid activities that increase the pain or cause stiffness.  Apply heating pad, ice pack, OTC topical as needed.  Massage to loosen muscles.  Continues NSAID as needed  F/U if worsening

## 2022-08-26 NOTE — PROGRESS NOTES
GENTRY Westbrook   [facility]  [address] [facility] [address]      PATIENT NAME: Xavier Harmon  : 1965  DATE: 22  MRN: [id number]      Billing Provider: GENTRY Westbrook  Level of Service:   Patient PCP Information     Provider PCP Type    GENTRY Westbrook [facility]          Reason for Visit / Chief Complaint: Follow-up (Lab review / can not afford diclofenac gel )       History of Present Illness / Problem Focused Workflow     Xavier Harmon presents to the clinic with Follow-up (Lab review / can not afford diclofenac gel )     55 yo male here today for virtual f/u. PMH HTN and Tobacco use (1/2 ppd), 30 ppy hx).    Prostate Cancer Screening - 22 PSA, Follow up Annually  Colon Cancer Screening - 22 FIT Test  Osteoporosis Screening - 22 Vitamin D level  STI Screening -     22   Pt reports today for evaluation of cysts on his back since he has insurance now, US ordered and will refer to surgery clinic pending results. He also [place] [facility] [place] in his neck twice (1 month ago), was given antibiotics for treatment, area is  and swollen, agreeable to soft tissue US order today. Reports his penis was swollen 3-4 days ago, but has gone down, this has occurred a few times, agreeable to US of scrotum. Pt with a positive HCV AB back in April, will run HCV RNA today. Pt reports with right knee pain that comes and goes out of now where, sometimes occurs at night time, has been going on for greater than 1 year, the left knee does not   bother him, reports not taking any medicine for his knees at this time, is agreeable to right knee x-rays today. Pt also agreeable to Shingles & TDAP today, also Cologuard screening and LC Screening.  Will f/u next Friday via audio virtual for lab and diagnostic review.     22  Pt here today for review of recent testing - scrotal US shows inflamed  sebaceous cyst, pt reports he's not having any symptoms as of now, we discussed ED / UCC precautions in regards to future, will continue to monitor and refer as needed. Soft tissue US of the back shows a lipomas and sebaceous cysts, pt is agreeable to referral to general surgery clinic for removal. Also reviewed right knee x-ray, showed degenerative changes, pt agreeable to referral to [facility], will continue conservative treatment therapies. Agreeable to 6 month f/u with labs, denies any other acute issues today, will find a dentist that accepts his insurance. HCV RNA was positive, pt reports he has never been diagnosed or treated for HCV, agreeable to referral to ID clinic today.   Denies chest pain, shortness of breath, cough, fever, headache, dizziness, weakness, abdominal pain, nausea, vomiting, diarrhea, constipation, black/bloody stools, unplanned weight loss, night sweats, changes in urinary patterns, burning/odor with urination, depression, anxiety, and SI/HI.       Follow-up  Associated symptoms include arthralgias (right knee pain ).       Review of Systems     Review of Systems   Constitutional: Negative.    HENT: Negative.    Eyes: Negative.    Respiratory: Negative.    Cardiovascular: Negative.    Gastrointestinal: Negative.    Endocrine: Negative.    Genitourinary: Negative.    Musculoskeletal: Positive for arthralgias (right knee pain ).   Neurological: Negative.    Psychiatric/Behavioral: Negative.        Medical / Social / Family History     Past Medical History:   Diagnosis Date    HTN (hypertension)     Obesity, unspecified     Tobacco abuse        History reviewed. No pertinent surgical history.    Social History    reports that he has been smoking cigarettes. He started smoking about 47 years ago. He has never used smokeless tobacco. He reports current alcohol use. He reports that he does not use drugs.    Family History  's family history includes Diabetes in his mother; Hypertension in  his mother; Stroke in his father.    Medications and Allergies     Medications  Medication List with Changes/Refills   Current Medications    DICLOFENAC SODIUM (VOLTAREN) 1 % GEL    Apply 2 g topically 4 (four) times daily. To right knee.    LISINOPRIL (PRINIVIL,ZESTRIL) 20 MG TABLET    Take 1 tablet (20 mg total) by mouth every morning. For High Blood Pressure         Allergies  Review of patient's allergies indicates:  No Known Allergies    Physical Examination   There were no vitals filed for this visit.  Physical Exam  HENT:      Right Ear: Hearing normal.      Left Ear: Hearing normal.   Neurological:      Mental Status: He is alert and oriented to person, place, and time.   Psychiatric:         Mood and Affect: Mood normal.           Results     Lab Results   Component Value Date    WBC 12.9 (H) 08/16/2022    RBC 6.89 (H) 08/16/2022    HGB 16.0 08/16/2022    HCT 49.2 08/16/2022    MCV 71.4 (L) 08/16/2022    MCH 23.2 (L) 08/16/2022    MCHC 32.5 (L) 08/16/2022    RDW 17.4 (H) 08/16/2022     08/16/2022    MPV 10.9 (H) 08/16/2022     Sodium Level   Date Value Ref Range Status   08/16/2022 141 136 - 145 mmol/L Final     Potassium Level   Date Value Ref Range Status   08/16/2022 3.8 3.5 - 5.1 mmol/L Final     Carbon Dioxide   Date Value Ref Range Status   08/16/2022 29 22 - 29 mmol/L Final     Blood Urea Nitrogen   Date Value Ref Range Status   08/16/2022 13.6 8.4 - 25.7 mg/dL Final     Creatinine   Date Value Ref Range Status   08/16/2022 0.77 0.73 - 1.18 mg/dL Final     Calcium Level Total   Date Value Ref Range Status   08/16/2022 9.8 8.4 - 10.2 mg/dL Final     Albumin Level   Date Value Ref Range Status   08/16/2022 3.1 (L) 3.5 - 5.0 gm/dL Final     Bilirubin Total   Date Value Ref Range Status   08/16/2022 1.0 <=1.5 mg/dL Final     Alkaline Phosphatase   Date Value Ref Range Status   08/16/2022 153 (H) 40 - 150 unit/L Final     Aspartate Aminotransferase   Date Value Ref Range Status   08/16/2022 71 (H)  5 - 34 unit/L Final     Alanine Aminotransferase   Date Value Ref Range Status   08/16/2022 94 (H) 0 - 55 unit/L Final     Estimated GFR-Non    Date Value Ref Range Status   07/08/2018 >60 mL/min/1.73 m2 Final     Lab Results   Component Value Date    CHOL 177 08/16/2022     Lab Results   Component Value Date    HDL 61 (H) 08/16/2022     No results found for: LDLCALC  Lab Results   Component Value Date    TRIG 108 08/16/2022     No results found for: CHOLHDL  No results found for: TSH  Lab Results   Component Value Date    PHUR 7.0 01/22/2021    PROTEINUA Trace (A) 01/22/2021    GLUCUA Negative 01/22/2021    KETONESU Trace (A) 01/22/2021    OCCULTUA Negative 01/22/2021    NITRITE Negative 01/22/2021    LEUKOCYTESUR Trace (A) 01/22/2021     Lab Results   Component Value Date    HGBA1C 6.1 08/16/2022     No results found for: MICROALBUR, [id number]   No results found for this or any previous visit.         Assessment and Plan (including Health Maintenance)     Plan:         Health Maintenance Due   Topic Date Due    Hepatitis C Screening  Never done    Pneumococcal Vaccines (Age 0-64) (1 - PCV) Never done    HIV Screening  Never done    Colorectal Cancer Screening  Never done    COVID-19 Vaccine (2 - Moderna series) 09/19/2021       Problem List Items Addressed This Visit        Derm    Sebaceous cyst - Primary    Overview     Testicular sebaceous cyst  UCC Precautions   Monitor            Current Assessment & Plan     Testicular sebaceous cyst  UCC Precautions   Monitor           Relevant Orders    Ambulatory referral/consult to Orthopedics       Cardiac/Vascular    Primary hypertension (Chronic)    Relevant Orders    CBC Auto Differential    Urinalysis, Reflex to Urine Culture Urine, Clean Catch       Oncology    Lipoma of back    Current Assessment & Plan     Referral to General Surgery            Relevant Orders    Ambulatory referral/consult to Orthopedics       GI    Elevated LFTs    Relevant  Orders    Comprehensive Metabolic Panel    Hepatitis C    Current Assessment & Plan     Referral to [facility] ID clinic            Relevant Orders    Ambulatory referral/consult to Infectious Disease       Orthopedic    Primary osteoarthritis of right knee    Current Assessment & Plan     Knee X-rays on Chart  Referral to [facility]  Perform knee stretches daily  Exercise as tolerated (low impact)  Avoid activities that increase the pain or cause stiffness.  Apply heating pad, ice pack, OTC topical as needed.  Massage to loosen muscles.  Continues NSAID as needed  F/U if worsening            Relevant Orders    Ambulatory referral/consult to General Surgery       Other    Cigarette nicotine dependence without complication (Chronic)    Current Assessment & Plan     Smoking cessation discussed > 3 mins.   Pt not ready to quit.  Discussed benefits of quitting including improved health, decreased cardiac/vascular/pulmonary/stroke risks as well as saving money.                   Health Maintenance Topics with due status: Not Due       Topic Last Completion Date    Lipid Panel 08/16/2022    TETANUS VACCINE 08/18/2022    Shingles Vaccine 08/18/2022    Influenza Vaccine Not Due       Future Appointments   Date Time Provider Department Center   8/26/2022  9:15 AM GENTRY Westbrook [facility]   8/26/2022 12:00 PM [facility] [address] [facility]            Signature:     [facility]  [address] [facility] [address]    Date of encounter: 8/26/22    Established Patient - Audio Only Telehealth Visit     The patient location is: home  The chief complaint leading to consultation is: lab review  Visit type: Virtual visit with audio only (telephone)  Total time spent with patient: 20 mins     The reason for the audio only service rather than synchronous audio and video virtual visit was related to technical difficulties or patient  preference/necessity.     Each patient to whom I provide medical services by telemedicine is:  (1) informed of the relationship between the physician and patient and the respective role of any other health care provider with respect to management of the patient; and (2) notified that they may decline to receive medical services by telemedicine and may withdraw from such care at any time. Patient verbally consented to receive this service via voice-only telephone call.       This service was not originating from a related E/M service provided within the previous 7 days nor will  to an E/M service or procedure within the next 24 hours or my soonest available appointment.  Prevailing standard of care was able to be met in this audio-only visit.

## 2022-08-26 NOTE — ASSESSMENT & PLAN NOTE
Smoking cessation discussed > 3 mins.   Pt not ready to quit.  Discussed benefits of quitting including improved health, decreased cardiac/vascular/pulmonary/stroke risks as well as saving money.

## 2022-09-03 ENCOUNTER — DOCUMENTATION ONLY (OUTPATIENT)
Dept: FAMILY MEDICINE | Facility: CLINIC | Age: 57
End: 2022-09-03
Payer: MEDICAID

## 2022-09-19 NOTE — TELEPHONE ENCOUNTER
I contacted pharmacy and a change in code from OTC was submitted and no copay status for patient . Pharmacy getting medication ready for patient. Patient aware.

## 2022-09-20 ENCOUNTER — OFFICE VISIT (OUTPATIENT)
Dept: SURGERY | Facility: CLINIC | Age: 57
End: 2022-09-20
Payer: MEDICAID

## 2022-09-20 VITALS
HEART RATE: 105 BPM | DIASTOLIC BLOOD PRESSURE: 87 MMHG | OXYGEN SATURATION: 96 % | RESPIRATION RATE: 18 BRPM | BODY MASS INDEX: 28.77 KG/M2 | SYSTOLIC BLOOD PRESSURE: 139 MMHG | WEIGHT: 201 LBS | HEIGHT: 70 IN

## 2022-09-20 DIAGNOSIS — D17.1 LIPOMA OF BACK: ICD-10-CM

## 2022-09-20 DIAGNOSIS — L72.3 SEBACEOUS CYST: Primary | ICD-10-CM

## 2022-09-20 PROCEDURE — 99214 OFFICE O/P EST MOD 30 MIN: CPT | Mod: PBBFAC

## 2022-09-20 RX ORDER — SODIUM CHLORIDE 9 MG/ML
INJECTION, SOLUTION INTRAVENOUS CONTINUOUS
Status: CANCELLED | OUTPATIENT
Start: 2022-09-20

## 2022-09-20 RX ORDER — HEPARIN SODIUM 5000 [USP'U]/ML
5000 INJECTION, SOLUTION INTRAVENOUS; SUBCUTANEOUS
Status: CANCELLED | OUTPATIENT
Start: 2022-09-20 | End: 2022-09-21

## 2022-09-20 NOTE — PROGRESS NOTES
Patient seen by Dr.A. Zhu. Scheduled for surgery October 3rd. Written and verbal instructions given.

## 2022-09-20 NOTE — H&P (VIEW-ONLY)
"Chief complaint:  had concerns including  Referral 08/26/22 lipoma on back.     HPI:   Xavier Harmon is a 56 y.o. male with PMHx significant for HTN and tobacco usage who presents for a recurring sebaceous cyst in the midline of back and a mass just right of midline of spine. Patient states that he had the cyst drained about 1 year ago in [place]; however, the cyst has grown back and now itches him regularly. He states that about one month ago his internal medicine doctor noticed the mass on his right back. He states that he has not had any pain or itching from that mass and did not even notice it was present. He denies any fever, chills, nausea, vomiting, or night sweats.    ROS:  As stated above      PMHx:  has a past medical history of HTN (hypertension), Obesity, unspecified, and Tobacco abuse.  PSHx:  has no past surgical history on file.  FamHx: family history includes Diabetes in his mother; Hypertension in his mother; Stroke in his father.  SocHx:  reports that he has been smoking cigarettes. He started smoking about 47 years ago. He has never used smokeless tobacco. He reports current alcohol use. He reports that he does not use drugs.    Physical Exam:  Vitals: Blood pressure 139/87, pulse 105, resp. rate 18, height 5' 10" (1.778 m), weight 91.2 kg (201 lb), SpO2 96 %.  General: awake, alert, cooperative, in no acute distress. Pt oriented x3.  Mood/affect normal.   Chest: RRR  Lungs: stable on room air  Skin: incision site from previous drainage is well-healed and cyst about 2cm x 2cm with mild tenderness to palpation. It is mobile.; 5 cm x 5 cm mass, soft, mobile  Neuro: AAO x 3, follows commands      Assessment and Plan:  Xavier Harmon is a 56 y.o. male with PMHx significant for HTN and tobacco usage who presents for a recurring sebaceous cyst in the midline of back and a mass just right of midline of spine. Likely recurring cyst and lipoma on back. US consistent with sebaceous cyst and lipoma.    - " Consent obtained in clinic  - Schedule for excision of lipoma and drainage of the sebaceous cyst on 10/3/22 with MD Otoniel Lomas MD  General Surgery [facility]

## 2022-09-20 NOTE — PROGRESS NOTES
"Chief complaint:  had concerns including  Referral 08/26/22 lipoma on back.     HPI:   Xavier Harmon is a 56 y.o. male with PMHx significant for HTN and tobacco usage who presents for a recurring sebaceous cyst in the midline of back and a mass just right of midline of spine. Patient states that he had the cyst drained about 1 year ago in [place]; however, the cyst has grown back and now itches him regularly. He states that about one month ago his internal medicine doctor noticed the mass on his right back. He states that he has not had any pain or itching from that mass and did not even notice it was present. He denies any fever, chills, nausea, vomiting, or night sweats.    ROS:  As stated above      PMHx:  has a past medical history of HTN (hypertension), Obesity, unspecified, and Tobacco abuse.  PSHx:  has no past surgical history on file.  FamHx: family history includes Diabetes in his mother; Hypertension in his mother; Stroke in his father.  SocHx:  reports that he has been smoking cigarettes. He started smoking about 47 years ago. He has never used smokeless tobacco. He reports current alcohol use. He reports that he does not use drugs.    Physical Exam:  Vitals: Blood pressure 139/87, pulse 105, resp. rate 18, height 5' 10" (1.778 m), weight 91.2 kg (201 lb), SpO2 96 %.  General: awake, alert, cooperative, in no acute distress. Pt oriented x3.  Mood/affect normal.   Chest: RRR  Lungs: stable on room air  Skin: incision site from previous drainage is well-healed and cyst about 2cm x 2cm with mild tenderness to palpation. It is mobile.; 5 cm x 5 cm mass, soft, mobile  Neuro: AAO x 3, follows commands      Assessment and Plan:  Xavier Harmon is a 56 y.o. male with PMHx significant for HTN and tobacco usage who presents for a recurring sebaceous cyst in the midline of back and a mass just right of midline of spine. Likely recurring cyst and lipoma on back. US consistent with sebaceous cyst and lipoma.    - " Consent obtained in clinic  - Schedule for excision of lipoma and drainage of the sebaceous cyst on 10/3/22 with MD Otoniel Lomas MD  General Surgery [facility]

## 2022-09-23 ENCOUNTER — ANESTHESIA EVENT (OUTPATIENT)
Dept: SURGERY | Facility: HOSPITAL | Age: 57
End: 2022-09-23
Payer: MEDICAID

## 2022-09-23 NOTE — ANESTHESIA PREPROCEDURE EVALUATION
09/23/2022  Xavier Harmon is a 56 y.o., male.    COVID STATUS: VACCINE 1 of 2 (MODERNA 8/22/21); TEST DOS  BETA-BLOCKER: NONE    PAT NURSE PHONE INTERVIEW 9/28/22    PROBLEM LIST:  -  RECURRENT MIDLINE BACK SEBACEOUS CYST, RIGHT BACK MASS (LIKELY LIPOMA)   -  HTN  -  NEWLY Dx HEP C (8/18/22)  -  T2DM (NO MEDs) - 8/16/22 A1c 6.1% / (PREV. A1c 6.5%)  -  OA- RIGHT KNEE  -  SMOKER 24 PPY; RLL TINY NODULES (BENIGN)  -  ETOH  -  PAST CRACK COCAINE (LASTLY 4yrs. AGO); IVDU DENIED    AM Rx DOS: NONE    ORDERS -   SURGEON: 8/16/22 CBC, CMP, A1c, FREE T4; 8/26/22 CT THORAX; NO NEW ORDERS  ANESTHESIA: DM PROTOCOL     Pre-op Assessment    I have reviewed the Patient Summary Reports.     I have reviewed the Nursing Notes. I have reviewed the NPO Status.   I have reviewed the Medications.     Review of Systems  Anesthesia Hx:  No problems with previous Anesthesia  History of prior surgery of interest to airway management or planning: Denies Family Hx of Anesthesia complications.   Denies Personal Hx of Anesthesia complications.   Social:  Smoker    Hematology/Oncology:  Hematology Normal   Oncology Normal     EENT/Dental:EENT/Dental Normal   Cardiovascular:   Exercise tolerance: good Hypertension    Pulmonary:  Pulmonary Normal    Renal/:  Renal/ Normal     Hepatic/GI:   Liver Disease, Hepatitis, C    Musculoskeletal:   Arthritis     Neurological:  Neurology Normal    Endocrine:   Diabetes, well controlled, type 2    Dermatological:  Skin Normal    Psych:  Psychiatric Normal           Physical Exam  General: Cooperative, Well nourished, Alert and Oriented    Airway:  Mallampati: III / III  Mouth Opening: Small, but > 3cm  TM Distance: Normal  Tongue: Normal  Neck ROM: Normal ROM    Dental:  Periodontal disease, Caps / Implants, Edentulous        Anesthesia Plan  Type of Anesthesia, risks & benefits  discussed:    Anesthesia Type: Gen ETT  Intra-op Monitoring Plan: Standard ASA Monitors  Post Op Pain Control Plan: multimodal analgesia, peripheral nerve block and IV/PO Opioids PRN  Induction:  IV  Airway Plan: Direct  Informed Consent: Informed consent signed with the Patient and all parties understand the risks and agree with anesthesia plan.  All questions answered. Patient consented to blood products? No  ASA Score: 2  Day of Surgery Review of History & Physical: H&P Update referred to the surgeon/provider.    Ready For Surgery From Anesthesia Perspective.     .    Lab Results   Component Value Date    WBC 12.9 (H) 08/16/2022    HGB 16.0 08/16/2022    HCT 49.2 08/16/2022    MCV 71.4 (L) 08/16/2022     08/16/2022     CMP  Sodium Level   Date Value Ref Range Status   08/16/2022 141 136 - 145 mmol/L Final     Potassium Level   Date Value Ref Range Status   08/16/2022 3.8 3.5 - 5.1 mmol/L Final     Carbon Dioxide   Date Value Ref Range Status   08/16/2022 29 22 - 29 mmol/L Final     Blood Urea Nitrogen   Date Value Ref Range Status   08/16/2022 13.6 8.4 - 25.7 mg/dL Final     Creatinine   Date Value Ref Range Status   08/16/2022 0.77 0.73 - 1.18 mg/dL Final     Calcium Level Total   Date Value Ref Range Status   08/16/2022 9.8 8.4 - 10.2 mg/dL Final     Albumin Level   Date Value Ref Range Status   08/16/2022 3.1 (L) 3.5 - 5.0 gm/dL Final     Bilirubin Total   Date Value Ref Range Status   08/16/2022 1.0 <=1.5 mg/dL Final     Alkaline Phosphatase   Date Value Ref Range Status   08/16/2022 153 (H) 40 - 150 unit/L Final     Aspartate Aminotransferase   Date Value Ref Range Status   08/16/2022 71 (H) 5 - 34 unit/L Final     Alanine Aminotransferase   Date Value Ref Range Status   08/16/2022 94 (H) 0 - 55 unit/L Final     Estimated GFR-Non    Date Value Ref Range Status   07/08/2018 >60 mL/min/1.73 m2 Final     Lab Results   Component Value Date    HGBA1C 6.1 08/16/2022   No results found for:  HEPCAB      8/26/22 CT THORAX  FINDINGS:  LUNG NODULES: There are tiny, sub 6 mm pulmonary nodules.  For example two sub 6 mm nodules are seen in the right lower lobe (series 2, image 119 and image 122).  Punctate nodule is seen in the anterior left upper lobe (series 2, image 101).     LUNGS/PLEURA: Clear lungs. No pleural effusion or thickening.     AIRWAYS: Patent.     BASE OF NECK: No significant abnormality.     AORTA: Aortic atherosclerosis.     HEART: Normal size. No effusion. There are coronary artery calcifications.     HERNANDEZ/MEDIASTINUM: No enlarged lymph nodes by size criteria.     UPPER ABDOMEN: No abnormality of the partially imaged upper abdomen.     THORACIC SOFT TISSUES: Unremarkable.     BONES: No acute fracture. No suspicious lytic or sclerotic lesions.     Impression:     Sub 6 mm pulmonary nodules.     ASSESSMENT:     LUNG RADS 2.  Benign appearance or behavior.  Risk of malignancy less than 1%. Continue annual screening with low-dose CT in 12 months.

## 2022-09-23 NOTE — ASSESSMENT & PLAN NOTE
Problem: At Risk for Falls  Goal: # Patient does not fall  Outcome: Outcome Met, Continue evaluating goal progress toward completion     Problem: Angina/Chest Pain  Goal: # Achieves Chest Pain Control (Pain Score = 0-1, no episodes)  Description: Chest pain control = Pain Score = 0-1, no episodes of pain  Outcome: Outcome Met, Continue evaluating goal progress toward completion      Educated on increasing foods high in Vitamin D such as fish oil, cod liver oil, salmon, milk fortified with vitamin D.  Vitamin D 2000 I.U. tablets daily (purchase over the counter).  Repeat Vitamin D level as ordered.

## 2022-10-03 ENCOUNTER — HOSPITAL ENCOUNTER (OUTPATIENT)
Facility: HOSPITAL | Age: 57
Discharge: HOME | End: 2022-10-03
Attending: SURGERY | Admitting: SURGERY
Payer: MEDICAID

## 2022-10-03 ENCOUNTER — ANESTHESIA (OUTPATIENT)
Dept: SURGERY | Facility: HOSPITAL | Age: 57
End: 2022-10-03
Payer: MEDICAID

## 2022-10-03 DIAGNOSIS — L72.3 SEBACEOUS CYST: Primary | ICD-10-CM

## 2022-10-03 PROBLEM — D17.1 LIPOMA OF BACK: Status: RESOLVED | Noted: 2022-08-26 | Resolved: 2022-10-03

## 2022-10-03 LAB
CTP QC/QA: YES
POCT GLUCOSE: 102 MG/DL (ref 70–110)
SARS-COV-2 AG RESP QL IA.RAPID: NEGATIVE

## 2022-10-03 PROCEDURE — 63600175 PHARM REV CODE 636 W HCPCS: Performed by: NURSE ANESTHETIST, CERTIFIED REGISTERED

## 2022-10-03 PROCEDURE — 88304 TISSUE EXAM BY PATHOLOGIST: CPT | Performed by: SURGERY

## 2022-10-03 PROCEDURE — 25000003 PHARM REV CODE 250: Performed by: SPECIALIST

## 2022-10-03 PROCEDURE — 71000033 HC RECOVERY, INTIAL HOUR: Performed by: SURGERY

## 2022-10-03 PROCEDURE — 87811 SARS-COV-2 COVID19 W/OPTIC: CPT | Performed by: SURGERY

## 2022-10-03 PROCEDURE — 00300 ANES ALL PX INTEG H/N/PTRUNK: CPT | Performed by: SURGERY

## 2022-10-03 PROCEDURE — 37000008 HC ANESTHESIA 1ST 15 MINUTES: Performed by: SURGERY

## 2022-10-03 PROCEDURE — 37000009 HC ANESTHESIA EA ADD 15 MINS: Performed by: SURGERY

## 2022-10-03 PROCEDURE — 71000015 HC POSTOP RECOV 1ST HR: Performed by: SURGERY

## 2022-10-03 PROCEDURE — 25000003 PHARM REV CODE 250: Performed by: NURSE ANESTHETIST, CERTIFIED REGISTERED

## 2022-10-03 PROCEDURE — 71000016 HC POSTOP RECOV ADDL HR: Performed by: SURGERY

## 2022-10-03 PROCEDURE — 36000707: Performed by: SURGERY

## 2022-10-03 PROCEDURE — 63600175 PHARM REV CODE 636 W HCPCS: Performed by: STUDENT IN AN ORGANIZED HEALTH CARE EDUCATION/TRAINING PROGRAM

## 2022-10-03 PROCEDURE — 36000706: Performed by: SURGERY

## 2022-10-03 PROCEDURE — 63600175 PHARM REV CODE 636 W HCPCS: Performed by: SPECIALIST

## 2022-10-03 RX ORDER — BUPIVACAINE HYDROCHLORIDE AND EPINEPHRINE 5; 5 MG/ML; UG/ML
INJECTION, SOLUTION EPIDURAL; INTRACAUDAL; PERINEURAL
Status: DISCONTINUED
Start: 2022-10-03 | End: 2022-10-03 | Stop reason: HOSPADM

## 2022-10-03 RX ORDER — CEFAZOLIN SODIUM 1 G/3ML
INJECTION, POWDER, FOR SOLUTION INTRAMUSCULAR; INTRAVENOUS
Status: DISCONTINUED | OUTPATIENT
Start: 2022-10-03 | End: 2022-10-03

## 2022-10-03 RX ORDER — MIDAZOLAM HYDROCHLORIDE 1 MG/ML
INJECTION INTRAMUSCULAR; INTRAVENOUS
Status: DISCONTINUED
Start: 2022-10-03 | End: 2022-10-03 | Stop reason: HOSPADM

## 2022-10-03 RX ORDER — PROPOFOL 10 MG/ML
VIAL (ML) INTRAVENOUS
Status: DISCONTINUED | OUTPATIENT
Start: 2022-10-03 | End: 2022-10-03

## 2022-10-03 RX ORDER — LIDOCAINE HYDROCHLORIDE 20 MG/ML
INJECTION INTRAVENOUS
Status: DISCONTINUED | OUTPATIENT
Start: 2022-10-03 | End: 2022-10-03

## 2022-10-03 RX ORDER — PROCHLORPERAZINE EDISYLATE 5 MG/ML
5 INJECTION INTRAMUSCULAR; INTRAVENOUS ONCE AS NEEDED
Status: DISCONTINUED | OUTPATIENT
Start: 2022-10-03 | End: 2022-10-03

## 2022-10-03 RX ORDER — CEFAZOLIN SODIUM 2 G/50ML
2 SOLUTION INTRAVENOUS
Status: DISCONTINUED | OUTPATIENT
Start: 2022-10-03 | End: 2022-10-03 | Stop reason: HOSPADM

## 2022-10-03 RX ORDER — LIDOCAINE HYDROCHLORIDE 10 MG/ML
1 INJECTION, SOLUTION EPIDURAL; INFILTRATION; INTRACAUDAL; PERINEURAL ONCE
Status: DISPENSED | OUTPATIENT
Start: 2022-10-03

## 2022-10-03 RX ORDER — PHENYLEPHRINE HYDROCHLORIDE 10 MG/ML
INJECTION INTRAVENOUS
Status: DISCONTINUED | OUTPATIENT
Start: 2022-10-03 | End: 2022-10-03

## 2022-10-03 RX ORDER — KETOROLAC TROMETHAMINE 30 MG/ML
INJECTION, SOLUTION INTRAMUSCULAR; INTRAVENOUS
Status: DISCONTINUED | OUTPATIENT
Start: 2022-10-03 | End: 2022-10-03

## 2022-10-03 RX ORDER — SUCCINYLCHOLINE CHLORIDE 20 MG/ML
INJECTION INTRAMUSCULAR; INTRAVENOUS
Status: DISCONTINUED | OUTPATIENT
Start: 2022-10-03 | End: 2022-10-03

## 2022-10-03 RX ORDER — OXYCODONE AND ACETAMINOPHEN 5; 325 MG/1; MG/1
2 TABLET ORAL ONCE
Status: DISCONTINUED | OUTPATIENT
Start: 2022-10-03 | End: 2022-10-03 | Stop reason: HOSPADM

## 2022-10-03 RX ORDER — FENTANYL CITRATE 50 UG/ML
INJECTION, SOLUTION INTRAMUSCULAR; INTRAVENOUS
Status: DISCONTINUED | OUTPATIENT
Start: 2022-10-03 | End: 2022-10-03

## 2022-10-03 RX ORDER — INSULIN ASPART 100 [IU]/ML
2-9 INJECTION, SOLUTION INTRAVENOUS; SUBCUTANEOUS
Status: ACTIVE | OUTPATIENT
Start: 2022-10-03

## 2022-10-03 RX ORDER — MIDAZOLAM HYDROCHLORIDE 1 MG/ML
2 INJECTION INTRAMUSCULAR; INTRAVENOUS ONCE
Status: COMPLETED | OUTPATIENT
Start: 2022-10-03 | End: 2022-10-03

## 2022-10-03 RX ORDER — HYDROMORPHONE HYDROCHLORIDE 1 MG/ML
0.5 INJECTION, SOLUTION INTRAMUSCULAR; INTRAVENOUS; SUBCUTANEOUS EVERY 5 MIN PRN
Status: DISCONTINUED | OUTPATIENT
Start: 2022-10-03 | End: 2022-10-03

## 2022-10-03 RX ORDER — ONDANSETRON 2 MG/ML
4 INJECTION INTRAMUSCULAR; INTRAVENOUS ONCE
Status: DISCONTINUED | OUTPATIENT
Start: 2022-10-03 | End: 2022-10-03

## 2022-10-03 RX ORDER — HYDROMORPHONE HYDROCHLORIDE 1 MG/ML
0.2 INJECTION, SOLUTION INTRAMUSCULAR; INTRAVENOUS; SUBCUTANEOUS EVERY 5 MIN PRN
Status: DISCONTINUED | OUTPATIENT
Start: 2022-10-03 | End: 2022-10-03

## 2022-10-03 RX ORDER — IPRATROPIUM BROMIDE AND ALBUTEROL SULFATE 2.5; .5 MG/3ML; MG/3ML
3 SOLUTION RESPIRATORY (INHALATION) ONCE AS NEEDED
Status: DISCONTINUED | OUTPATIENT
Start: 2022-10-03 | End: 2022-10-03

## 2022-10-03 RX ORDER — INSULIN ASPART 100 [IU]/ML
4-12 INJECTION, SOLUTION INTRAVENOUS; SUBCUTANEOUS
Status: ACTIVE | OUTPATIENT
Start: 2022-10-03

## 2022-10-03 RX ORDER — HEPARIN SODIUM 5000 [USP'U]/ML
5000 INJECTION, SOLUTION INTRAVENOUS; SUBCUTANEOUS
Status: COMPLETED | OUTPATIENT
Start: 2022-10-03 | End: 2022-10-03

## 2022-10-03 RX ORDER — MEPERIDINE HYDROCHLORIDE 25 MG/ML
12.5 INJECTION INTRAMUSCULAR; INTRAVENOUS; SUBCUTANEOUS ONCE
Status: DISCONTINUED | OUTPATIENT
Start: 2022-10-03 | End: 2022-10-03

## 2022-10-03 RX ORDER — ROCURONIUM BROMIDE 10 MG/ML
INJECTION, SOLUTION INTRAVENOUS
Status: DISCONTINUED | OUTPATIENT
Start: 2022-10-03 | End: 2022-10-03

## 2022-10-03 RX ORDER — SODIUM CHLORIDE 9 MG/ML
INJECTION, SOLUTION INTRAVENOUS CONTINUOUS
Status: DISCONTINUED | OUTPATIENT
Start: 2022-10-03 | End: 2022-10-03 | Stop reason: HOSPADM

## 2022-10-03 RX ORDER — ONDANSETRON 2 MG/ML
INJECTION INTRAMUSCULAR; INTRAVENOUS
Status: DISCONTINUED | OUTPATIENT
Start: 2022-10-03 | End: 2022-10-03

## 2022-10-03 RX ORDER — HYDROCODONE BITARTRATE AND ACETAMINOPHEN 5; 325 MG/1; MG/1
1 TABLET ORAL EVERY 6 HOURS PRN
Qty: 15 TABLET | Refills: 0 | Status: SHIPPED | OUTPATIENT
Start: 2022-10-03 | End: 2022-11-10 | Stop reason: ALTCHOICE

## 2022-10-03 RX ORDER — SODIUM CHLORIDE, SODIUM LACTATE, POTASSIUM CHLORIDE, CALCIUM CHLORIDE 600; 310; 30; 20 MG/100ML; MG/100ML; MG/100ML; MG/100ML
INJECTION, SOLUTION INTRAVENOUS CONTINUOUS
Status: DISCONTINUED | OUTPATIENT
Start: 2022-10-03 | End: 2022-10-03 | Stop reason: HOSPADM

## 2022-10-03 RX ORDER — OXYCODONE AND ACETAMINOPHEN 5; 325 MG/1; MG/1
1 TABLET ORAL ONCE
Status: COMPLETED | OUTPATIENT
Start: 2022-10-03 | End: 2022-10-03

## 2022-10-03 RX ORDER — DEXAMETHASONE SODIUM PHOSPHATE 4 MG/ML
INJECTION, SOLUTION INTRA-ARTICULAR; INTRALESIONAL; INTRAMUSCULAR; INTRAVENOUS; SOFT TISSUE
Status: DISCONTINUED | OUTPATIENT
Start: 2022-10-03 | End: 2022-10-03

## 2022-10-03 RX ORDER — DIPHENHYDRAMINE HYDROCHLORIDE 50 MG/ML
25 INJECTION INTRAMUSCULAR; INTRAVENOUS ONCE AS NEEDED
Status: DISCONTINUED | OUTPATIENT
Start: 2022-10-03 | End: 2022-10-03

## 2022-10-03 RX ADMIN — CEFAZOLIN 2 G: 330 INJECTION, POWDER, FOR SOLUTION INTRAMUSCULAR; INTRAVENOUS at 07:10

## 2022-10-03 RX ADMIN — PHENYLEPHRINE HYDROCHLORIDE 200 MCG: 10 INJECTION INTRAVENOUS at 07:10

## 2022-10-03 RX ADMIN — FENTANYL CITRATE 25 MCG: 50 INJECTION, SOLUTION INTRAMUSCULAR; INTRAVENOUS at 08:10

## 2022-10-03 RX ADMIN — PHENYLEPHRINE HYDROCHLORIDE 100 MCG: 10 INJECTION INTRAVENOUS at 07:10

## 2022-10-03 RX ADMIN — FENTANYL CITRATE 50 MCG: 50 INJECTION, SOLUTION INTRAMUSCULAR; INTRAVENOUS at 07:10

## 2022-10-03 RX ADMIN — PHENYLEPHRINE HYDROCHLORIDE 200 MCG: 10 INJECTION INTRAVENOUS at 08:10

## 2022-10-03 RX ADMIN — FENTANYL CITRATE 25 MCG: 50 INJECTION, SOLUTION INTRAMUSCULAR; INTRAVENOUS at 07:10

## 2022-10-03 RX ADMIN — SUGAMMADEX 200 MG: 100 INJECTION, SOLUTION INTRAVENOUS at 08:10

## 2022-10-03 RX ADMIN — SODIUM CHLORIDE, POTASSIUM CHLORIDE, SODIUM LACTATE AND CALCIUM CHLORIDE: 600; 310; 30; 20 INJECTION, SOLUTION INTRAVENOUS at 06:10

## 2022-10-03 RX ADMIN — MIDAZOLAM 2 MG: 1 INJECTION INTRAMUSCULAR; INTRAVENOUS at 06:10

## 2022-10-03 RX ADMIN — PROPOFOL 200 MG: 10 INJECTION, EMULSION INTRAVENOUS at 07:10

## 2022-10-03 RX ADMIN — LIDOCAINE HYDROCHLORIDE 100 MG: 20 INJECTION, SOLUTION INTRAVENOUS at 07:10

## 2022-10-03 RX ADMIN — PHENYLEPHRINE HYDROCHLORIDE 100 MCG: 10 INJECTION INTRAVENOUS at 08:10

## 2022-10-03 RX ADMIN — ONDANSETRON 4 MG: 2 INJECTION INTRAMUSCULAR; INTRAVENOUS at 08:10

## 2022-10-03 RX ADMIN — ROCURONIUM BROMIDE 10 MG: 10 SOLUTION INTRAVENOUS at 07:10

## 2022-10-03 RX ADMIN — ROCURONIUM BROMIDE 30 MG: 10 SOLUTION INTRAVENOUS at 07:10

## 2022-10-03 RX ADMIN — OXYCODONE AND ACETAMINOPHEN 1 TABLET: 325; 5 TABLET ORAL at 09:10

## 2022-10-03 RX ADMIN — HEPARIN SODIUM 5000 UNITS: 5000 INJECTION INTRAVENOUS; SUBCUTANEOUS at 05:10

## 2022-10-03 RX ADMIN — SUCCINYLCHOLINE CHLORIDE 200 MG: 20 INJECTION, SOLUTION INTRAMUSCULAR; INTRAVENOUS at 07:10

## 2022-10-03 RX ADMIN — DEXAMETHASONE SODIUM PHOSPHATE 4 MG: 4 INJECTION, SOLUTION INTRA-ARTICULAR; INTRALESIONAL; INTRAMUSCULAR; INTRAVENOUS; SOFT TISSUE at 07:10

## 2022-10-03 RX ADMIN — KETOROLAC TROMETHAMINE 30 MG: 30 INJECTION, SOLUTION INTRAMUSCULAR; INTRAVENOUS at 07:10

## 2022-10-03 NOTE — OP NOTE
PATIENT NAME: Xavier Harmon  MRN: [id number]  ADMIT DATE: 10/3/2022  PROCEDURE DATE: 10/3/22    SURGEON: Dr. Nick Grewal MD    RESIDENT: Otoniel Zhu MD    PREOPERATIVE DIAGNOSES:  Recurrent Sebaceous Cyst of Back  Soft Tissue Mass of Back    POSTOPERATIVE DIAGNOSES:  Recurrent Sebaceous Cyst of Back  Soft Tissue Mass of Back    PROCEDURE PERFORMED:  Back Soft Tissue Mass excision x2    INDICATION: This is a 55 yo M with a PMH of HTN and tobacco use who presents with a recurrent sebaceous cyst of his back. He had undergone excision of this mass about two years ago in [place]. He also has noticed a second soft tissue mass on his back. He underwent US of his back that has showed findings consistent with a lipoma and a sebaceous cyst.    FINDINGS: Sebaceous cyst excised in entirety with normal surrounding fat. Soft tissue mass consistent with lipoma excised with capsule intact.    ANESTHESIA: General Anesthesia    DRAINS: None    SPECIMEN:  Soft Tissue Mass of Back  Recurrent Sebaceous Cyst of Back    ESTIMATED BLOOD LOSS: 20cc    COMPLICATIONS: None    TECHNIQUE:   Informed consented was obtained prior to the procedure. All risks, benefits, alternatives were explained in detail to the patient. The patient was wheeled into the operating theatre. Anesthesia induced the patient and performed endotracheal intubation. The patient was placed in the prone position. The back was prepped and draped in the standard fashion. A formal timeout was held confirming correct patient, procedure, site, preoperative antibiotics, VTE prophylaxis, and all operating room staff.     The soft tissue mass and recurrent sebaceous cyst were marked out. The soft tissue mass measured 4x3cm. The recurrent sebaceous cyst measured 2x2cm. The soft tissue mass was excised using a transverse incision overlying the mass. Cautery was used to dissect through the dermis and circumferentially dissect the mass off subcutaneous fat and off the  fascia. This was sent for permanent specimen as soft tissue mass of back. The recurrent sebaceous cyst was excised with an elliptical incision ensuring to stay outside of the capsule using electrocautery. The cyst was excised with normal subcutaneous tissue ensuring to stay outside of the cavity. It was taken off the fascia of the trapezius muscle with electrocautery. Both cavities were irrigated with saline and inspected for hemostasis. Hemostasis was achieved with electrocautery. The dermis of each incision was re-approximated with a simple running 2-0 vicryl stitch. 2-0 Nylon vertical mattress sutures were used to tata the wounds and reduce tension on the wound. Gauze and tape was used to dress the wound.    At the conclusion of the procedure all counts were correct x 2. The patient tolerated the procedure well, was extubated and transferred to PACU in stable condition.

## 2022-10-03 NOTE — DISCHARGE SUMMARY
[facility]  Discharge Note  Short Stay    Procedure(s) (LRB):  EXCISION, MASS, BACK (N/A)    OUTCOME: Patient tolerated treatment/procedure well without complication and is now ready for discharge.    DISPOSITION: Home or Self Care    FINAL DIAGNOSIS:  Lipoma of Back. Recurrent Sebaceous Cyst of Back    FOLLOWUP: In clinic    DISCHARGE INSTRUCTIONS:    Discharge Procedure Orders   Diet Adult Regular     Notify your health care provider if you experience any of the following:  redness, tenderness, or signs of infection (pain, swelling, redness, odor or green/yellow discharge around incision site)     Notify your health care provider if you experience any of the following:  severe uncontrolled pain     No dressing needed     Weight bearing restrictions (specify):   Order Comments: No lifting greater than 30 lbs for 3 weeks.        TIME SPENT ON DISCHARGE: 20 minutes

## 2022-10-03 NOTE — ANESTHESIA POSTPROCEDURE EVALUATION
Anesthesia Post Evaluation    Patient: Xavier Harmon    Procedure(s) Performed: Procedure(s) (LRB):  EXCISION, MASS, BACK (N/A)    Final Anesthesia Type: general      Patient location during evaluation: PACU  Patient participation: Yes- Able to Participate  Level of consciousness: awake and responds to stimulation  Post-procedure vital signs: reviewed and stable  Pain management: adequate  Airway patency: patent    PONV status at discharge: No PONV  Anesthetic complications: no      Cardiovascular status: blood pressure returned to baseline  Respiratory status: unassisted  Hydration status: euvolemic  Follow-up not needed.          Vitals Value Taken Time   /91 10/03/22 0855   Temp 36.7 °C (98.1 °F) 10/03/22 0855   Pulse 70 10/03/22 0920   Resp 18 10/03/22 0944   SpO2 97 % 10/03/22 0920         Event Time   Out of Recovery 08:50:00         Pain/Shantanu Score: Pain Rating Prior to Med Admin: 4 (10/3/2022  9:44 AM)  Shantanu Score: 10 (10/3/2022  8:55 AM)

## 2022-10-03 NOTE — INTERVAL H&P NOTE
The patient has been examined and the H&P has been reviewed:    I concur with the findings and no changes have occurred since H&P was written.    Surgery risks, benefits and alternative options discussed and understood by patient/family.    To OR for excision of lipoma and drainage of sebaceous cyst      There are no hospital problems to display for this patient.

## 2022-10-03 NOTE — DISCHARGE INSTRUCTIONS
· Keep follow up appointment at the surgery Clinic ·   We will ask the surgeon to call you about the surgery you just had.  No heavy lifting or straining for 4-6 weeks.    · You may take a shower tomorrow. You may let water run over incision, cleanse with warm water and soap, pat dry.  · Do not soak your wound in water until cleared by MD. Do not take baths, swim, or use a hot tub until your doctor says it is okay.    · Use pain medication as instructed. Do not take Tylenol (acetaminophen) with your Norco since Norco contains Tylenol as well. If you are experiencing severe pain even with your pain medications, please call the surgery clinic at [phone] to notify your doctor.    · You may use an ice pack as needed for 20 minutes at a time over your incision site to minimize swelling and help relieve pain.    · Do not drink alcohol or drive today, or as long as you are on pain medication.    · Notify MD of any moderate to severe pain unrelieved by pain medicine, if your incision opens and/or bleeds, or for any signs of infection including fever above 100.4, excessive redness or swelling, yellow/green foul- smelling drainage, nausea or vomiting. Clinics number is [phone] If it is after business hours or emergency call [phone] and state Im having post op complications and need to speak to the general  surgeon on call.    · Please check in to [facility] so the surgery nurses know how you are doing at home. We appreciate you

## 2022-10-03 NOTE — TRANSFER OF CARE
Anesthesia Transfer of Care Note    Patient: Xavier Harmon    Procedure(s) Performed: Procedure(s) (LRB):  EXCISION, MASS, BACK (N/A)    Patient location: PACU    Anesthesia Type: general    Transport from OR: Transported from OR on room air with adequate spontaneous ventilation    Post pain: adequate analgesia    Post assessment: no apparent anesthetic complications    Post vital signs: stable    Level of consciousness: awake    Nausea/Vomiting: no nausea/vomiting    Complications: none    Transfer of care protocol was followed

## 2022-10-03 NOTE — ANESTHESIA PROCEDURE NOTES
Intubation    Date/Time: 10/3/2022 7:08 AM  Performed by: Lesly Beal CRNA  Authorized by: Tammy Malone MD     Intubation:     Induction:  Rapid sequence induction    Intubated:  Postinduction    Mask Ventilation:  N/a    Attempts:  1    Attempted By:  CRNA    Method of Intubation:  Direct    Blade:  Glidescope 4    Laryngeal View Grade: Grade I - full view of cords      Difficult Airway Encountered?: No      Complications:  None    Airway Device:  Oral endotracheal tube    Airway Device Size:  7.5    Style/Cuff Inflation:  Cuffed (inflated to minimal occlusive pressure)    Inflation Amount (mL):  6    Tube secured:  22    Secured at:  The lips    Placement Verified By:  Capnometry    Complicating Factors:  None    Findings Post-Intubation:  BS equal bilateral and atraumatic/condition of teeth unchanged

## 2022-10-04 VITALS
SYSTOLIC BLOOD PRESSURE: 159 MMHG | DIASTOLIC BLOOD PRESSURE: 102 MMHG | RESPIRATION RATE: 18 BRPM | TEMPERATURE: 98 F | BODY MASS INDEX: 29.2 KG/M2 | WEIGHT: 203.5 LBS | HEART RATE: 63 BPM | OXYGEN SATURATION: 100 %

## 2022-10-05 LAB
ESTROGEN SERPL-MCNC: NORMAL PG/ML
INSULIN SERPL-ACNC: NORMAL U[IU]/ML
LAB AP CLINICAL INFORMATION: NORMAL
LAB AP GROSS DESCRIPTION: NORMAL
LAB AP REPORT FOOTNOTES: NORMAL
T3RU NFR SERPL: NORMAL %

## 2022-10-06 DIAGNOSIS — B18.2 CHRONIC HEPATITIS C WITHOUT HEPATIC COMA: Primary | ICD-10-CM

## 2022-10-07 ENCOUNTER — TELEPHONE (OUTPATIENT)
Dept: SURGERY | Facility: CLINIC | Age: 57
End: 2022-10-07
Payer: MEDICAID

## 2022-10-07 RX ORDER — TRAMADOL HYDROCHLORIDE 50 MG/1
50 TABLET ORAL EVERY 6 HOURS
Qty: 15 TABLET | Refills: 0 | Status: SHIPPED | OUTPATIENT
Start: 2022-10-07 | End: 2022-11-10 | Stop reason: ALTCHOICE

## 2022-10-07 NOTE — TELEPHONE ENCOUNTER
Patient called with complaints of pain and drainage from wound at back. Denies drainage and is taking Ibuprofen alternating with Tylenol. He was taking his prescription pain medication and doubled it over the last few days, due to pain.  Notified Dr. Zhu and he said they would call the patient and probably prescribe more pain medication.  Called patient back and let him know to expect a call from a Resident.  Patient verbalized understanding.

## 2022-10-18 ENCOUNTER — OFFICE VISIT (OUTPATIENT)
Dept: SURGERY | Facility: CLINIC | Age: 57
End: 2022-10-18
Payer: MEDICAID

## 2022-10-18 VITALS
HEIGHT: 70 IN | TEMPERATURE: 98 F | DIASTOLIC BLOOD PRESSURE: 97 MMHG | WEIGHT: 203.81 LBS | SYSTOLIC BLOOD PRESSURE: 162 MMHG | HEART RATE: 94 BPM | BODY MASS INDEX: 29.18 KG/M2 | OXYGEN SATURATION: 96 %

## 2022-10-18 DIAGNOSIS — Z98.890 POST-OPERATIVE STATE: Primary | ICD-10-CM

## 2022-10-18 PROCEDURE — 99214 OFFICE O/P EST MOD 30 MIN: CPT | Mod: PBBFAC

## 2022-10-18 NOTE — PROGRESS NOTES
Patient seen by Dr. MICHELLE Negron will return in one week. Written and verbal discharge instructions given.

## 2022-10-18 NOTE — PROGRESS NOTES
"Surgery Clinic Note     CC:   Chief Complaint   Patient presents with    Post-op Evaluation     S/p excision of back mass- pain has gotten better no complaints at present        HPI:  Xavier Harmon is a 56 y M with PMH of HTN who presents for a post op evaluation s/p excision of recurring sebaceous cyst and lipoma (10/03/22). He states that since the surgery he has been experiencing pain that he is controlling with ibuprofen. The drainage is serosanguineous. He denies F/C and N/V.     PMH:   Past Medical History:   Diagnosis Date    HTN (hypertension)     Obesity, unspecified     Tobacco abuse         PSH:   Past Surgical History:   Procedure Laterality Date    EXCISION OF MASS OF BACK N/A 10/3/2022    Procedure: EXCISION, MASS, BACK;  Surgeon: Nick Grewal MD;  Location: [facility];  Service: General;  Laterality: N/A;  Needs prone positioning.        Fam Hx:   Family History   Problem Relation Age of Onset    Hypertension Mother     Diabetes Mother     Stroke Father         Social Hx:   Social History     Tobacco Use    Smoking status: Every Day     Packs/day: 0.50     Years: 47.00     Pack years: 23.50     Types: Cigarettes     Start date: 6/21/1975    Smokeless tobacco: Never   Substance Use Topics    Alcohol use: Yes     Comment: occassioanlly    Drug use: Not Currently     Types: "Crack" cocaine        Allergies: Review of patient's allergies indicates:  No Known Allergies     ROS: Negative except above     Current Outpatient Medications on File Prior to Visit   Medication Sig Dispense Refill    diclofenac sodium (VOLTAREN) 1 % Gel Apply 2 g topically 4 (four) times daily. To right knee. 100 g 6    lisinopriL (PRINIVIL,ZESTRIL) 20 MG tablet Take 1 tablet (20 mg total) by mouth every morning. For High Blood Pressure 90 tablet 3    HYDROcodone-acetaminophen (NORCO) 5-325 mg per tablet Take 1 tablet by mouth every 6 (six) hours as needed for Pain. (Patient not taking: Reported on 10/18/2022) 15 tablet 0    " "traMADoL (ULTRAM) 50 mg tablet Take 1 tablet (50 mg total) by mouth every 6 (six) hours. (Patient not taking: Reported on 10/18/2022) 15 tablet 0     Current Facility-Administered Medications on File Prior to Visit   Medication Dose Route Frequency Provider Last Rate Last Admin    dextrose 10% bolus 125 mL  12.5 g Intravenous PRN Fide Del Angel-Rhiannon, FNP        dextrose 10% bolus 125 mL  12.5 g Intravenous PRN Fide Hongbert-Rhiannon, FNP        insulin aspart U-100 injection 2-9 Units  2-9 Units Subcutaneous PRN Fide S Del Angel-Rhiannon, FNP        insulin aspart U-100 injection 4-12 Units  4-12 Units Subcutaneous PRN Fide S Del Angel-Rhiannon, FNP        LIDOcaine (PF) 10 mg/ml (1%) injection 10 mg  1 mL Intradermal Once Fide S Del Angel-Rhiannon, FNP           Physical Exam  BP (!) 162/97 (BP Location: Right arm) Comment: took meds this morning  Pulse 94   Temp 98.3 °F (36.8 °C) (Oral)   Ht 5' 10" (1.778 m)   Wt 92.4 kg (203 lb 12.8 oz)   SpO2 96%   BMI 29.24 kg/m²   General: NAD, AAO X 3  CV: Regular rate and rhythm without murmurs  Resp: Clear to ascultation bilaterally  Abdomen: soft, non-tender, non-distended, bowel sounds present.   Skin: Incision lines healing well. Sutures in place. Serosanguineous drainage. Mild swelling surrounding inferior suture line.     Surgical Pathology:      1. Soft tissue back mass, Excision:  - Lipoma.    2. Back, upper, epidermal inclusion cyst, Excision:  - Epidermal inclusion cyst.      ASSESSMENT/PLAN  Xavier Harmon is a 56 y M with PMH of HTN who presents for a post op evaluation s/p excision of recurring sebaceous cyst and lipoma (10/03/22).     - RTC in 1 week for removal of stitches   - Advised to minimize ibuprofen usage    Aneta Mo, MS2    PGY5    Patient seen and examined. Path benign. Having some minimal drainage no signs of infection. Will give one more week before suture removal. RTC in one week.    Tj Negron MD       "

## 2022-10-19 ENCOUNTER — HOSPITAL ENCOUNTER (OUTPATIENT)
Dept: RADIOLOGY | Facility: HOSPITAL | Age: 57
Discharge: HOME | End: 2022-10-19
Attending: NURSE PRACTITIONER
Payer: MEDICAID

## 2022-10-19 DIAGNOSIS — B18.2 CHRONIC HEPATITIS C WITHOUT HEPATIC COMA: ICD-10-CM

## 2022-10-19 PROCEDURE — 76705 ECHO EXAM OF ABDOMEN: CPT | Mod: TC

## 2022-10-27 ENCOUNTER — OFFICE VISIT (OUTPATIENT)
Dept: SURGERY | Facility: CLINIC | Age: 57
End: 2022-10-27
Payer: MEDICAID

## 2022-10-27 VITALS
HEART RATE: 95 BPM | WEIGHT: 202.38 LBS | TEMPERATURE: 99 F | RESPIRATION RATE: 18 BRPM | DIASTOLIC BLOOD PRESSURE: 97 MMHG | HEIGHT: 70 IN | BODY MASS INDEX: 28.97 KG/M2 | OXYGEN SATURATION: 99 % | SYSTOLIC BLOOD PRESSURE: 169 MMHG

## 2022-10-27 DIAGNOSIS — T81.31XA POSTOPERATIVE DEHISCENCE OF SKIN WOUND, INITIAL ENCOUNTER: ICD-10-CM

## 2022-10-27 DIAGNOSIS — Z23 NEEDS FLU SHOT: Primary | ICD-10-CM

## 2022-10-27 PROCEDURE — 90686 IIV4 VACC NO PRSV 0.5 ML IM: CPT | Mod: PBBFAC

## 2022-10-27 PROCEDURE — 99214 OFFICE O/P EST MOD 30 MIN: CPT | Mod: PBBFAC

## 2022-10-27 RX ORDER — MUPIROCIN 20 MG/G
OINTMENT TOPICAL 3 TIMES DAILY
Qty: 2 G | Refills: 0 | Status: SHIPPED | OUTPATIENT
Start: 2022-10-27 | End: 2022-11-10 | Stop reason: ALTCHOICE

## 2022-10-27 RX ORDER — DICLOFENAC SODIUM 75 MG/1
75 TABLET, DELAYED RELEASE ORAL 2 TIMES DAILY PRN
Qty: 20 TABLET | Refills: 0 | Status: SHIPPED | OUTPATIENT
Start: 2022-10-27 | End: 2024-01-30

## 2022-10-27 NOTE — PROGRESS NOTES
"Surgery Clinic Note     Chief Complaint   Patient presents with    Suture / Staple Removal       HPI:  56 y.o. male presents 3 weeks post-op lipoma and epidermal cyst excision (10/3/2022). He complains of pain and bloody drainage at the lower incision site. He still has the sutures in place. He states that he has been keeping the sites clean but noticed that it started draining after he lifted a heavy case of water bottles. He is no longer taking ibuprofen for pain relief because it was causing GI upset. He denies f/c/n/v/CP/SOB.    Additionally, he requests an annual influenza vaccine.     PMH:   Past Medical History:   Diagnosis Date    HTN (hypertension)     Obesity, unspecified     Tobacco abuse         PSH:   Past Surgical History:   Procedure Laterality Date    EXCISION OF MASS OF BACK N/A 10/3/2022    Procedure: EXCISION, MASS, BACK;  Surgeon: Nick Grewal MD;  Location: [facility];  Service: General;  Laterality: N/A;  Needs prone positioning.       Fam Hx:   Family History   Problem Relation Age of Onset    Hypertension Mother     Diabetes Mother     Stroke Father         Social Hx:   Social History     Socioeconomic History    Marital status: Single   Tobacco Use    Smoking status: Every Day     Packs/day: 0.50     Years: 47.00     Pack years: 23.50     Types: Cigarettes     Start date: 6/21/1975    Smokeless tobacco: Never   Substance and Sexual Activity    Alcohol use: Yes     Comment: occassioanlly    Drug use: Not Currently     Types: "Crack" cocaine    Sexual activity: Not Currently        Allergies: Review of patient's allergies indicates:  No Known Allergies     ROS: Negative except above     Current Outpatient Medications on File Prior to Visit   Medication Sig Dispense Refill    diclofenac sodium (VOLTAREN) 1 % Gel Apply 2 g topically 4 (four) times daily. To right knee. 100 g 6    lisinopriL (PRINIVIL,ZESTRIL) 20 MG tablet Take 1 tablet (20 mg total) by mouth every morning. For High Blood " "Pressure 90 tablet 3    HYDROcodone-acetaminophen (NORCO) 5-325 mg per tablet Take 1 tablet by mouth every 6 (six) hours as needed for Pain. (Patient not taking: No sig reported) 15 tablet 0    traMADoL (ULTRAM) 50 mg tablet Take 1 tablet (50 mg total) by mouth every 6 (six) hours. (Patient not taking: No sig reported) 15 tablet 0     Current Facility-Administered Medications on File Prior to Visit   Medication Dose Route Frequency Provider Last Rate Last Admin    dextrose 10% bolus 125 mL  12.5 g Intravenous PRN Fide S Del Angel-Rhiannon, FNP        dextrose 10% bolus 125 mL  12.5 g Intravenous PRN Fide S Del Angel-Rhiannon, FNP        insulin aspart U-100 injection 2-9 Units  2-9 Units Subcutaneous PRN Fide S Del Angel-Rhiannon, FNP        insulin aspart U-100 injection 4-12 Units  4-12 Units Subcutaneous PRN Fide S Del Angel-Rhiannon, FNP        LIDOcaine (PF) 10 mg/ml (1%) injection 10 mg  1 mL Intradermal Once Fide S Del Angel-Rhiannon, FNP           Physical Exam  BP (!) 169/97 (BP Location: Right arm, Patient Position: Sitting)   Pulse 95   Temp 98.6 °F (37 °C) (Oral)   Resp 18   Ht 5' 10" (1.778 m)   Wt 91.8 kg (202 lb 6.4 oz)   SpO2 99%   BMI 29.04 kg/m²   General: NAD, AAO X 3  CV: Regular rate and rhythm without murmurs  Resp: Clear to ascultation bilaterally  Abdomen: soft, non-tender, non-distended, bowel sounds present  Back: Two incision sites on upper back. The most superior is c/d/I without erythema or induration. The inferior incision is open with serosanguinous drainage, surrounding erythema and induration and is tender to palpation.         Surgical Pathology:   Final Diagnosis  1. Soft tissue back mass, Excision:  - Lipoma.  2. Back, upper, epidermal inclusion cyst, Excision:  - Epidermal inclusion cyst.        ASSESSMENT/PLAN  This is a 56 y.o. male 3 weeks post-op lipoma and epidermal cyst excision (10/3/2022) located on the back, for suture removal with serosanguinous drainage from the lower incision " site.    - Sutures removed from both incisions, patient tolerated well (4 from top incision, 5 from bottom incision)  - Rx: mupirocin 2 % ointment, apply topically 3 times daily  - Follow up with wound care. Follow up with surgery clinic after completion of wound care.    - Influenza vaccine administered, consent obtained prior to administration    Jamaica Fragoso, GISELA  10/27/2022 12:57 PM      I have reviewed and edited the above note as necessary. Pt presents for suture removal of 2 incisions, bleeding and pain noted around lower incision. Sutures removed today, pt tolerated procedure very well. Rx mupirocin ointment to be applied to inferior wound, diclofenac ordered for pain, and ambulatory referral sent to wound care.      Caitlin Casas PGY-1

## 2022-10-27 NOTE — PROGRESS NOTES
Seen by Dr. Casas.  Wound care referral placed.  Flu vaccine 0.5ml given Left Gluteal.  Tolerated well.  Discharge instructions verbal and written given.

## 2022-11-04 ENCOUNTER — HOSPITAL ENCOUNTER (OUTPATIENT)
Dept: WOUND CARE | Facility: HOSPITAL | Age: 57
Discharge: HOME | End: 2022-11-04
Attending: NURSE PRACTITIONER
Payer: MEDICAID

## 2022-11-04 VITALS — HEART RATE: 109 BPM | DIASTOLIC BLOOD PRESSURE: 97 MMHG | SYSTOLIC BLOOD PRESSURE: 165 MMHG

## 2022-11-04 DIAGNOSIS — S21.209A OPEN WOUND OF MIDLINE OF BACK: ICD-10-CM

## 2022-11-04 DIAGNOSIS — T81.31XA POSTOPERATIVE DEHISCENCE OF SKIN WOUND, INITIAL ENCOUNTER: ICD-10-CM

## 2022-11-04 PROCEDURE — 99499 NO LOS: ICD-10-PCS | Mod: ,,, | Performed by: NURSE PRACTITIONER

## 2022-11-04 PROCEDURE — 11042 DBRDMT SUBQ TIS 1ST 20SQCM/<: CPT

## 2022-11-04 PROCEDURE — 99499 UNLISTED E&M SERVICE: CPT | Mod: ,,, | Performed by: NURSE PRACTITIONER

## 2022-11-04 PROCEDURE — 97597 DBRDMT OPN WND 1ST 20 CM/<: CPT

## 2022-11-04 NOTE — PROGRESS NOTES
"CHIEF COMPLAINT:    Non-healing surgical wound to back      HISTORY OF  PRESENT ILLNESS:  56 y.o. Black or  male being seen today for evaluation and management of non-healing surgical wound to middle upper back, after removal of lipoma by [facility] general surgery on 10/3/2022.  Seen by general surgery on 10/27/2022, when sutures removed from two incisions.  Lipoma and sebaceous cyst removed same day of surgery.  Upper wound has healed.  Lower wound has been slow to heal.  General surgery has s/o on case.  Current wound care:  applying Mupirocin ointment three times/day.  Not prescribed any antibiotics presently.  Followed by GENTRY Westbrook for PCP. History includes:        Past Medical History:   Diagnosis Date    Chronic pain of right knee 8/18/2022    Cigarette nicotine dependence without complication 6/6/2022    Elevated LFTs 8/26/2022    Hepatitis C antibody test positive 8/18/2022    HTN (hypertension)     Illicit drug use 6/6/2022    Lymphadenopathy 8/18/2022    Mass of skin of back 8/18/2022    Obesity, unspecified     Open wound of midline of back 11/4/2022    Prediabetes 6/6/2022    Primary hypertension 6/6/2022    Primary osteoarthritis of right knee 8/26/2022    Tobacco abuse     Vitamin D deficiency 8/18/2022      Past Surgical History:   Procedure Laterality Date    EXCISION OF MASS OF BACK N/A 10/3/2022    Procedure: EXCISION, MASS, BACK;  Surgeon: Nick Grewal MD;  Location: [facility];  Service: General;  Laterality: N/A;  Needs prone positioning.      Social History     Socioeconomic History    Marital status: Single   Tobacco Use    Smoking status: Every Day     Packs/day: 0.50     Years: 47.00     Pack years: 23.50     Types: Cigarettes     Start date: 6/21/1975    Smokeless tobacco: Never   Substance and Sexual Activity    Alcohol use: Yes     Comment: occassioanlly    Drug use: Not Currently     Types: "Crack" cocaine    Sexual activity: Not Currently       Review of Most Recent " Wound Care-Related Labs:  Lab Results   Component Value Date/Time    WBC 12.9 (H) 08/16/2022 09:27 AM    RBC 6.89 (H) 08/16/2022 09:27 AM    HGB 16.0 08/16/2022 09:27 AM    HCT 49.2 08/16/2022 09:27 AM    MCV 71.4 (L) 08/16/2022 09:27 AM    MCH 23.2 (L) 08/16/2022 09:27 AM    CREATININE 0.77 08/16/2022 09:27 AM    HGBA1C 6.1 08/16/2022 09:27 AM        10/3/2022 Pathology:  1. Soft tissue back mass, Excision:  - Lipoma.    2. Back, upper, epidermal inclusion cyst, Excision:  - Epidermal inclusion cyst.          Today 11/4/2022:  Really does not want to go back to surgery to have lower wound re-evaluated.  Denies fevers, chills, wound odor, wound pain, and wound drainage.  Other than one wound to back, no new skin breakdown, rashes, or other skin issues.   Sister will be doing wound care.        REVIEW OF SYSTEMS:  Except as stated in HPI, all other 10 body systems normal.     Current Outpatient Medications   Medication Sig Dispense Refill    diclofenac (VOLTAREN) 75 MG EC tablet Take 1 tablet (75 mg total) by mouth 2 (two) times daily as needed (pain). 20 tablet 0    diclofenac sodium (VOLTAREN) 1 % Gel Apply 2 g topically 4 (four) times daily. To right knee. 100 g 6    HYDROcodone-acetaminophen (NORCO) 5-325 mg per tablet Take 1 tablet by mouth every 6 (six) hours as needed for Pain. (Patient not taking: No sig reported) 15 tablet 0    lisinopriL (PRINIVIL,ZESTRIL) 20 MG tablet Take 1 tablet (20 mg total) by mouth every morning. For High Blood Pressure 90 tablet 3    mupirocin (BACTROBAN) 2 % ointment Apply topically 3 (three) times daily. 2 g 0    traMADoL (ULTRAM) 50 mg tablet Take 1 tablet (50 mg total) by mouth every 6 (six) hours. (Patient not taking: No sig reported) 15 tablet 0     No current facility-administered medications for this encounter.     Facility-Administered Medications Ordered in Other Encounters   Medication Dose Route Frequency Provider Last Rate Last Admin    dextrose 10% bolus 125 mL  12.5 g  Intravenous PRN Fide Chicas, FNP        dextrose 10% bolus 125 mL  12.5 g Intravenous PRN Fide Pereziley, FNP        insulin aspart U-100 injection 2-9 Units  2-9 Units Subcutaneous PRN Fide Pereziley, FNP        insulin aspart U-100 injection 4-12 Units  4-12 Units Subcutaneous PRN Fide Pereziley, FNP        LIDOcaine (PF) 10 mg/ml (1%) injection 10 mg  1 mL Intradermal Once Fide Chicas, FNP             PHYSICAL EXAMINATION AND VITAL SIGNS:    Vitals:    11/04/22 1339   BP: (!) 165/97   Pulse: 109     There is no height or weight on file to calculate BMI.      General:   Hypertensive, asymptomatic with.   Well-nourished, in no acute distress.   Respiratory:  Breathing even, quiet, and unlabored.  No coughing.  Musculoskeletal:  Full range of motion of all extremities.  Ambulating without assistive device.   Neurologic:  A&O X 3.  Cranial nerves grossly intact.     Psychiatric:  Calm, cooperative.  Mood and effect normal.  Responses appropriate.   Integumentary:      Surgical wound to mid-upper back:  100% slough covered upon initial evaluation, with large periwound firm mass.  Initially appeared that sebaceous cyst had not been removed, in its entirety.  With direct palpation, a large amount of sanguinous drainage erupted from center of wound, followed by large chunks of coagulated blood and liquefied SQ tissue.  With further exploration of wound, determined there was significant dead space undermining wound opening.  Direct palpation, with today's bedside surgical debridement, cleared significant amount of non-viable liquefied SQ tissue, coagulated blood, and non-viable tissue.  Once surgical debridement was completed, surrounding bump was resolved and all periwound skin was even around remaining surgical wound.  No odors or purulent drainage.  No periwound erythema.   Note:  width of wound decreased following surgical debridement because wound edges coalesced after  underlying hematoma was evacuated.    Surgical wound to proximal mid-upper back:  Wound is 100% epithelialized with wound edges well-approximated.  No underlying bumps or masses with healed incision line.  No erythema, purulent drainage, periwound edema, odors, induration, bogginess, or fluctuance.              Incision/Site 10/03/22 0828 Back (Active)   10/03/22 0828   Present Prior to Hospital Arrival?:    Side:    Location: Back   Orientation:    Incision Type:    Closure Method:    Additional Comments:    Removal Indication and Assessment:    Wound Outcome:    Removal Indications:    Dressing Appearance Moist drainage 11/04/22 1339   Drainage Amount Moderate 11/04/22 1339   Wound Length (cm) 3 cm 11/04/22 1339   Wound Width (cm) 3.5 cm 11/04/22 1339   Wound Depth (cm) 2 cm 11/04/22 1339   Wound Volume (cm^3) 21 cm^3 11/04/22 1339   Wound Surface Area (cm^2) 10.5 cm^2 11/04/22 1339   Care Cleansed with:;Antimicrobial agent 11/04/22 1339                 ASSESSMENT AND PLAN:    Status post excisional removal of sebaceous cyst and lipoma from mid-upper back by [facility] general surgery on 10/3/2022.    Proximal wound is healed.  Distal wound has not healed.  Not prescribed any antibiotics presently.  No s/s of localized infection.      Surgical wound to mid-upper back:  Underlying hematoma and non-viable tissue precluding contraction and closure of wound.  Explained that to Mr. Harmon, with recommendations for bedside debridement today.  He was agreeable to that.      Procedure Today:  Surgical, excisional, non-selective debridement (86049 1st 20 cm):  Rationale for Surgical Debridement:  wound healing not possible with underlying non-viable tissue, AEB above photo.  Initially recommended referring back to general surgery to reopen wound to remove cyst sack; however, with direct palpation, discovered there was underlying post-surgical hematoma and non-viable SQ tissue.    Expected Outcomes of Surgical Debridement:   "Decreased risk of infection, effective wound bed preparation, and to promote wound healing.  Will now be able to order NPWT for granulation and closure of wound; he will need NPWT to granulate in significant undermining around entire wound bed.    Informed consent obtained:  written  Anaesthetic used:  None  Instruments Used:  Forceps, scissors, and #7 dermal curette  Wound dimensions after surgical debridement:  1.5 cm x 3.5 cm x 2.0 cm, with 3.0 cm of undermining around entire perimeter of wound bed.    Debrided down to, and including, subcutaneous tissue.   Patient did report mild discomfort with procedure; however, declined need for injectable Lidocaine.  Moderate bleeding, which was controlled with pressure and Iodiform packing gauze.  Bleeding stopped prior to Mr. Harmon leaving wound clinic.   Post-debridement instructions provided on s/s to call wound clinic for promptly.   Wound Care Today/New Wound Tx Plan:  Irrigate wound with Dakins 0.25% and pat dry.  Pack wound lightly with Iodiform 1/4" packing gauze, followed by secondary dressing.  To be changed daily.    Will have staff order NPWT Monday; he is to RTC Thursday for application.  Explained NPWT therapy with him, expected outcomes, benefits, risks, and length of tx (approx 3 weeks).  Handout given with AVS today.   8/16/2022:  Albumin 3.1.      Pre-diabetes:  8/16/2022:  HgbA1C 6.1%.   Being monitored by PCP.      Elevated Liver Enzymes with Untreated Hepatitis C:  8/18/2022:  HCV RNA viral load was 4,800,000  8/16/2022:  AST 71.  ALT 94.      Hx of illicit drug use:  Last drug screen on chart dated 7/8/2018 was positive for cocaine.                                                       Return to clinic in Thursday of next week for VAC application.  We will see him on a Monday/Thursday schedule for dressing changes.   Teaching provided on s/s to call wound clinic for promptly.     "

## 2022-11-10 ENCOUNTER — HOSPITAL ENCOUNTER (OUTPATIENT)
Dept: WOUND CARE | Facility: HOSPITAL | Age: 57
Discharge: HOME | End: 2022-11-10
Attending: NURSE PRACTITIONER
Payer: MEDICAID

## 2022-11-10 VITALS
DIASTOLIC BLOOD PRESSURE: 100 MMHG | RESPIRATION RATE: 20 BRPM | SYSTOLIC BLOOD PRESSURE: 160 MMHG | TEMPERATURE: 99 F | HEART RATE: 102 BPM

## 2022-11-10 DIAGNOSIS — T81.31XD POSTOPERATIVE DEHISCENCE OF SKIN WOUND, SUBSEQUENT ENCOUNTER: ICD-10-CM

## 2022-11-10 DIAGNOSIS — S21.209A OPEN WOUND OF MIDLINE OF BACK: Primary | ICD-10-CM

## 2022-11-10 DIAGNOSIS — I10 PRIMARY HYPERTENSION: ICD-10-CM

## 2022-11-10 PROCEDURE — 99211 OFF/OP EST MAY X REQ PHY/QHP: CPT

## 2022-11-10 PROCEDURE — 99213 PR OFFICE/OUTPT VISIT, EST, LEVL III, 20-29 MIN: ICD-10-PCS | Mod: ,,, | Performed by: NURSE PRACTITIONER

## 2022-11-10 PROCEDURE — 99213 OFFICE O/P EST LOW 20 MIN: CPT | Mod: ,,, | Performed by: NURSE PRACTITIONER

## 2022-11-10 NOTE — PROGRESS NOTES
TODAY'S VISIT NOTE WAS IMPORTED FROM LAST WOUND CLINIC VISIT OF 11/4/2022.  I ATTEST THAT I REVIEWED THE HPI, ROS, LABS, WOUND-RELATED IMAGING, PHYSICAL EXAMINATION, EVALUATION AND PLAN SECTIONS OF IMPORTED NOTE AND REVISED TODAY'S VISIT NOTE TO REFLECT TODAY'S NEW ASSESSMENT FINDINGS AND TODAY'S UPDATES TO WOUND TREATMENT PLAN.          CHIEF COMPLAINT:    Non-healing surgical wound to back      HISTORY OF  PRESENT ILLNESS:  57 y.o. Black or  male being seen today for follow-up of non-healing surgical wound to middle upper back, after removal of lipoma by [facility] general surgery on 10/3/2022.  Seen by general surgery on 10/27/2022, when sutures removed from two incisions.  Lipoma and sebaceous cyst removed same day of surgery.  Upper wound has healed.  Lower wound has been slow to heal.  Underwent bedside surgical debridement in wound clinic on 11/4/2022.  General surgery has s/o on case.  Current wound care:  irrigating with Dakins 0.25% solution, followed by Dakins 0.25%-moistened gauze, followed by secondary dressing.  Prescribed daily.   Not prescribed any antibiotics presently.  Followed by GENTRY Westbrook for PCP. History includes:        Past Medical History:   Diagnosis Date    Chronic pain of right knee 8/18/2022    Cigarette nicotine dependence without complication 6/6/2022    Elevated LFTs 8/26/2022    Hepatitis C antibody test positive 8/18/2022    HTN (hypertension)     Illicit drug use 6/6/2022    Lymphadenopathy 8/18/2022    Mass of skin of back 8/18/2022    Obesity, unspecified     Open wound of midline of back 11/4/2022    Prediabetes 6/6/2022    Primary hypertension 6/6/2022    Primary osteoarthritis of right knee 8/26/2022    Tobacco abuse     Vitamin D deficiency 8/18/2022      Past Surgical History:   Procedure Laterality Date    EXCISION OF MASS OF BACK N/A 10/3/2022    Procedure: EXCISION, MASS, BACK;  Surgeon: Nick Grewal MD;  Location: [facility];  Service: General;   "Laterality: N/A;  Needs prone positioning.      Social History     Socioeconomic History    Marital status: Single   Tobacco Use    Smoking status: Every Day     Packs/day: 0.50     Years: 47.00     Pack years: 23.50     Types: Cigarettes     Start date: 6/21/1975    Smokeless tobacco: Never   Substance and Sexual Activity    Alcohol use: Yes     Comment: occassioanlly    Drug use: Not Currently     Types: "Crack" cocaine    Sexual activity: Not Currently       Review of Most Recent Wound Care-Related Labs:  Lab Results   Component Value Date/Time    WBC 12.9 (H) 08/16/2022 09:27 AM    RBC 6.89 (H) 08/16/2022 09:27 AM    HGB 16.0 08/16/2022 09:27 AM    HCT 49.2 08/16/2022 09:27 AM    MCV 71.4 (L) 08/16/2022 09:27 AM    MCH 23.2 (L) 08/16/2022 09:27 AM    CREATININE 0.77 08/16/2022 09:27 AM    HGBA1C 6.1 08/16/2022 09:27 AM        10/3/2022 Pathology:  1. Soft tissue back mass, Excision:  - Lipoma.    2. Back, upper, epidermal inclusion cyst, Excision:  - Epidermal inclusion cyst.          Today 11/10/2022:  Sister has been doing wound care QOD, instead of daily; he was confused about frequency of wound care.  Is unable to come into clinic twice/week for NPWT dressing changes.  Is having difficulty getting to wound clinic on a weekly basis.  Asking for every 2-week visits, due to his ride.  Has all wound care supplies in home.  Denies fevers, chills, wound odor, wound redness, wound pain, or yellow/green drainage.  No new rashes, lesions, or skin breakdown.     11/4/2022:  Really does not want to go back to surgery to have lower wound re-evaluated.  Denies fevers, chills, wound odor, wound pain, and wound drainage.  Other than one wound to back, no new skin breakdown, rashes, or other skin issues.   Sister will be doing wound care.        REVIEW OF SYSTEMS:  Except as stated in HPI, all other 10 body systems normal.     Current Outpatient Medications   Medication Sig Dispense Refill    diclofenac (VOLTAREN) 75 MG EC " tablet Take 1 tablet (75 mg total) by mouth 2 (two) times daily as needed (pain). 20 tablet 0    diclofenac sodium (VOLTAREN) 1 % Gel Apply 2 g topically 4 (four) times daily. To right knee. 100 g 6    lisinopriL (PRINIVIL,ZESTRIL) 20 MG tablet Take 1 tablet (20 mg total) by mouth every morning. For High Blood Pressure 90 tablet 3     No current facility-administered medications for this encounter.     Facility-Administered Medications Ordered in Other Encounters   Medication Dose Route Frequency Provider Last Rate Last Admin    dextrose 10% bolus 125 mL  12.5 g Intravenous PRN Fide S Del Angel-Rhiannon, FNP        dextrose 10% bolus 125 mL  12.5 g Intravenous PRN Fide S Del Angel-Rhiannon, FNP        insulin aspart U-100 injection 2-9 Units  2-9 Units Subcutaneous PRN Fide S Del Angel-Rhiannon, FNP        insulin aspart U-100 injection 4-12 Units  4-12 Units Subcutaneous PRN Fide S Del Angel-Rhiannon, FNP        LIDOcaine (PF) 10 mg/ml (1%) injection 10 mg  1 mL Intradermal Once Fide S Del Angel-Rhiannon, FNP             PHYSICAL EXAMINATION AND VITAL SIGNS:    Vitals:    11/10/22 1125   BP: (!) 160/100   Pulse: 102   Resp: 20   Temp: 98.6 °F (37 °C)     There is no height or weight on file to calculate BMI.      General:   Hypertensive, asymptomatic with.   Well-nourished, in no acute distress.   Respiratory:  Breathing even, quiet, and unlabored.  No coughing.  Musculoskeletal:  Full range of motion of all extremities.  Ambulating without assistive device.   Neurologic:  A&O X 3.  Cranial nerves grossly intact.     Psychiatric:  Calm, cooperative.  Mood and effect normal.  Responses appropriate.   Integumentary:      Surgical wound to mid-upper back:  Wound dimensions smaller from last visit.  100% beefy red granulating tissue.  Undermining decreased significantly from last week.  No erythema, purulent drainage, periwound edema, odors, induration, bogginess, or fluctuance.            Incision/Site 10/03/22 0828 Back (Active)    10/03/22 0828   Present Prior to Hospital Arrival?:    Side:    Location: Back   Orientation:    Incision Type:    Closure Method:    Additional Comments:    Removal Indication and Assessment:    Wound Outcome:    Removal Indications:    Wound Image   11/10/22 1131   Dressing Appearance Moist drainage 11/10/22 1131   Drainage Amount Moderate 11/10/22 1131   Drainage Characteristics/Odor Serosanguineous 11/10/22 1131   Appearance Slough;Granulating 11/10/22 1131   Wound Length (cm) 1.2 cm 11/10/22 1131   Wound Width (cm) 3 cm 11/10/22 1131   Wound Depth (cm) 1.3 cm 11/10/22 1131   Wound Volume (cm^3) 4.68 cm^3 11/10/22 1131   Wound Surface Area (cm^2) 3.6 cm^2 11/10/22 1131                 ASSESSMENT AND PLAN:    Status post excisional removal of sebaceous cyst and lipoma from mid-upper back by [facility] general surgery on 10/3/2022.    Proximal wound is healed.  Distal wound has not healed.  Not prescribed any antibiotics presently.  No s/s of localized infection.      Surgical wound to mid-upper back:  Underwent surgical debridement at bedside here in wound clinic 11/4/2022 to remove underlying non-viable tissue and hematoma.  Wound responding well to current treatment plan; however, sister has been doing wound care QOD instead of daily, as prescribed.  He declined NPWT due to transportation issues.  He indicated he can only get to wound clinic every 2 weeks.    Wound Care Today/New Wound Care Tx Plan:  Irrigate wound with Dakins 0.25%, followed by Silver Alginate Advantage, followed by secondary dressing.  To be changed QOD.     8/16/2022:  Albumin 3.1.      Pre-diabetes:  8/16/2022:  HgbA1C 6.1%.   Being monitored by PCP.      Elevated Liver Enzymes with Untreated Hepatitis C:  8/18/2022:  HCV RNA viral load was 4,800,000  8/16/2022:  AST 71.  ALT 94.    Communication sent to PCP, JORDAN Iraheta, regarding abnormal lab findings.      Hx of illicit drug use:  Last drug screen on chart dated 7/8/2018 was positive for  cocaine.                                                       Return to clinic in two weeks.   Teaching reinforced on s/s to call wound clinic for promptly.

## 2022-11-15 ENCOUNTER — TELEPHONE (OUTPATIENT)
Dept: INFECTIOUS DISEASES | Facility: CLINIC | Age: 57
End: 2022-11-15
Payer: MEDICAID

## 2022-11-15 NOTE — TELEPHONE ENCOUNTER
Spoke with patient to remind of appt date and time and the need for lab prior to appt.  Pt states it is hard to get transportation to come early for labs, so he will get labs the day of appt.  U/S Abd done.

## 2022-11-22 ENCOUNTER — OFFICE VISIT (OUTPATIENT)
Dept: INFECTIOUS DISEASES | Facility: CLINIC | Age: 57
End: 2022-11-22
Payer: MEDICAID

## 2022-11-22 ENCOUNTER — CLINICAL SUPPORT (OUTPATIENT)
Dept: INTERNAL MEDICINE | Facility: CLINIC | Age: 57
End: 2022-11-22
Payer: MEDICAID

## 2022-11-22 ENCOUNTER — PROCEDURE VISIT (OUTPATIENT)
Dept: INFECTIOUS DISEASES | Facility: CLINIC | Age: 57
End: 2022-11-22
Payer: MEDICAID

## 2022-11-22 VITALS
DIASTOLIC BLOOD PRESSURE: 88 MMHG | TEMPERATURE: 98 F | HEART RATE: 100 BPM | BODY MASS INDEX: 29.2 KG/M2 | SYSTOLIC BLOOD PRESSURE: 162 MMHG | RESPIRATION RATE: 18 BRPM | WEIGHT: 204 LBS | HEIGHT: 70 IN

## 2022-11-22 VITALS
SYSTOLIC BLOOD PRESSURE: 154 MMHG | DIASTOLIC BLOOD PRESSURE: 97 MMHG | HEART RATE: 106 BPM | HEIGHT: 70 IN | OXYGEN SATURATION: 100 % | RESPIRATION RATE: 18 BRPM | WEIGHT: 206.31 LBS | BODY MASS INDEX: 29.54 KG/M2 | TEMPERATURE: 98 F

## 2022-11-22 DIAGNOSIS — I10 PRIMARY HYPERTENSION: Primary | Chronic | ICD-10-CM

## 2022-11-22 DIAGNOSIS — B19.20 HEPATITIS C VIRUS INFECTION WITHOUT HEPATIC COMA, UNSPECIFIED CHRONICITY: Primary | ICD-10-CM

## 2022-11-22 DIAGNOSIS — B18.2 CHRONIC HEPATITIS C WITHOUT HEPATIC COMA: ICD-10-CM

## 2022-11-22 LAB
ALBUMIN SERPL-MCNC: 3.6 GM/DL (ref 3.5–5)
ALBUMIN/GLOB SERPL: 0.8 RATIO (ref 1.1–2)
ALP SERPL-CCNC: 117 UNIT/L (ref 40–150)
ALT SERPL-CCNC: 102 UNIT/L (ref 0–55)
AST SERPL-CCNC: 61 UNIT/L (ref 5–34)
BASOPHILS # BLD AUTO: 0.05 X10(3)/MCL (ref 0–0.2)
BASOPHILS NFR BLD AUTO: 0.7 %
BILIRUBIN DIRECT+TOT PNL SERPL-MCNC: 0.5 MG/DL
BUN SERPL-MCNC: 9.8 MG/DL (ref 8.4–25.7)
CALCIUM SERPL-MCNC: 9.8 MG/DL (ref 8.4–10.2)
CHLORIDE SERPL-SCNC: 106 MMOL/L (ref 98–107)
CO2 SERPL-SCNC: 26 MMOL/L (ref 22–29)
CREAT SERPL-MCNC: 0.89 MG/DL (ref 0.73–1.18)
EOSINOPHIL # BLD AUTO: 0.08 X10(3)/MCL (ref 0–0.9)
EOSINOPHIL NFR BLD AUTO: 1 %
ERYTHROCYTE [DISTWIDTH] IN BLOOD BY AUTOMATED COUNT: 16.2 % (ref 11.5–17)
FERRITIN SERPL-MCNC: 457.72 NG/ML (ref 21.81–274.66)
GFR SERPLBLD CREATININE-BSD FMLA CKD-EPI: >60 MLS/MIN/1.73/M2
GLOBULIN SER-MCNC: 4.4 GM/DL (ref 2.4–3.5)
GLUCOSE SERPL-MCNC: 81 MG/DL (ref 74–100)
HAV AB SER QL IA: NONREACTIVE
HBV CORE AB SERPL QL IA: NONREACTIVE
HBV SURFACE AB SER-ACNC: 0.26 MIU/ML
HBV SURFACE AB SERPL IA-ACNC: NONREACTIVE M[IU]/ML
HBV SURFACE AG SERPL QL IA: NONREACTIVE
HCT VFR BLD AUTO: 48.7 % (ref 42–52)
HGB BLD-MCNC: 15.5 GM/DL (ref 14–18)
HIV 1+2 AB+HIV1 P24 AG SERPL QL IA: NONREACTIVE
IMM GRANULOCYTES # BLD AUTO: 0.02 X10(3)/MCL (ref 0–0.04)
IMM GRANULOCYTES NFR BLD AUTO: 0.3 %
LYMPHOCYTES # BLD AUTO: 3.05 X10(3)/MCL (ref 0.6–4.6)
LYMPHOCYTES NFR BLD AUTO: 40 %
MCH RBC QN AUTO: 22.8 PG (ref 27–31)
MCHC RBC AUTO-ENTMCNC: 31.8 MG/DL (ref 33–36)
MCV RBC AUTO: 71.7 FL (ref 80–94)
MONOCYTES # BLD AUTO: 0.76 X10(3)/MCL (ref 0.1–1.3)
MONOCYTES NFR BLD AUTO: 10 %
NEUTROPHILS # BLD AUTO: 3.7 X10(3)/MCL (ref 2.1–9.2)
NEUTROPHILS NFR BLD AUTO: 48 %
NRBC BLD AUTO-RTO: 0 %
PLATELET # BLD AUTO: 215 X10(3)/MCL (ref 130–400)
PLATELETS.RETICULATED NFR BLD AUTO: 8.3 % (ref 0.9–11.2)
PMV BLD AUTO: 11.3 FL (ref 7.4–10.4)
POTASSIUM SERPL-SCNC: 3.5 MMOL/L (ref 3.5–5.1)
PROT SERPL-MCNC: 8 GM/DL (ref 6.4–8.3)
RBC # BLD AUTO: 6.79 X10(6)/MCL (ref 4.7–6.1)
SODIUM SERPL-SCNC: 143 MMOL/L (ref 136–145)
T PALLIDUM AB SER QL: NONREACTIVE
WBC # SPEC AUTO: 7.6 X10(3)/MCL (ref 4.5–11.5)

## 2022-11-22 PROCEDURE — 87340 HEPATITIS B SURFACE AG IA: CPT | Performed by: NURSE PRACTITIONER

## 2022-11-22 PROCEDURE — 87902 NFCT AGT GNTYP ALYS HEP C: CPT | Performed by: NURSE PRACTITIONER

## 2022-11-22 PROCEDURE — 81596 NFCT DS CHRNC HCV 6 ASSAYS: CPT | Performed by: NURSE PRACTITIONER

## 2022-11-22 PROCEDURE — 86036 ANCA SCREEN EACH ANTIBODY: CPT | Performed by: NURSE PRACTITIONER

## 2022-11-22 PROCEDURE — 99214 PR OFFICE/OUTPT VISIT, EST, LEVL IV, 30-39 MIN: ICD-10-PCS | Mod: S$PBB,,, | Performed by: NURSE PRACTITIONER

## 2022-11-22 PROCEDURE — 3077F SYST BP >= 140 MM HG: CPT | Mod: CPTII,,, | Performed by: NURSE PRACTITIONER

## 2022-11-22 PROCEDURE — 1159F PR MEDICATION LIST DOCUMENTED IN MEDICAL RECORD: ICD-10-PCS | Mod: CPTII,,, | Performed by: NURSE PRACTITIONER

## 2022-11-22 PROCEDURE — 86704 HEP B CORE ANTIBODY TOTAL: CPT | Performed by: NURSE PRACTITIONER

## 2022-11-22 PROCEDURE — 1160F PR REVIEW ALL MEDS BY PRESCRIBER/CLIN PHARMACIST DOCUMENTED: ICD-10-PCS | Mod: CPTII,,, | Performed by: NURSE PRACTITIONER

## 2022-11-22 PROCEDURE — 3080F PR MOST RECENT DIASTOLIC BLOOD PRESSURE >= 90 MM HG: ICD-10-PCS | Mod: CPTII,,, | Performed by: NURSE PRACTITIONER

## 2022-11-22 PROCEDURE — 3077F PR MOST RECENT SYSTOLIC BLOOD PRESSURE >= 140 MM HG: ICD-10-PCS | Mod: CPTII,,, | Performed by: NURSE PRACTITIONER

## 2022-11-22 PROCEDURE — 82728 ASSAY OF FERRITIN: CPT | Performed by: NURSE PRACTITIONER

## 2022-11-22 PROCEDURE — 85025 COMPLETE CBC W/AUTO DIFF WBC: CPT | Performed by: NURSE PRACTITIONER

## 2022-11-22 PROCEDURE — 86780 TREPONEMA PALLIDUM: CPT | Performed by: NURSE PRACTITIONER

## 2022-11-22 PROCEDURE — 1160F RVW MEDS BY RX/DR IN RCRD: CPT | Mod: CPTII,,, | Performed by: NURSE PRACTITIONER

## 2022-11-22 PROCEDURE — 86038 ANTINUCLEAR ANTIBODIES: CPT | Performed by: NURSE PRACTITIONER

## 2022-11-22 PROCEDURE — 91200 LIVER ELASTOGRAPHY: CPT | Mod: PBBFAC | Performed by: INTERNAL MEDICINE

## 2022-11-22 PROCEDURE — 86706 HEP B SURFACE ANTIBODY: CPT | Performed by: NURSE PRACTITIONER

## 2022-11-22 PROCEDURE — 99214 OFFICE O/P EST MOD 30 MIN: CPT | Mod: PBBFAC,27 | Performed by: NURSE PRACTITIONER

## 2022-11-22 PROCEDURE — 87522 HEPATITIS C REVRS TRNSCRPJ: CPT | Performed by: NURSE PRACTITIONER

## 2022-11-22 PROCEDURE — 3008F PR BODY MASS INDEX (BMI) DOCUMENTED: ICD-10-PCS | Mod: CPTII,,, | Performed by: NURSE PRACTITIONER

## 2022-11-22 PROCEDURE — 86039 ANTINUCLEAR ANTIBODIES (ANA): CPT | Performed by: NURSE PRACTITIONER

## 2022-11-22 PROCEDURE — 3008F BODY MASS INDEX DOCD: CPT | Mod: CPTII,,, | Performed by: NURSE PRACTITIONER

## 2022-11-22 PROCEDURE — 3080F DIAST BP >= 90 MM HG: CPT | Mod: CPTII,,, | Performed by: NURSE PRACTITIONER

## 2022-11-22 PROCEDURE — 4010F PR ACE/ARB THEARPY RXD/TAKEN: ICD-10-PCS | Mod: CPTII,,, | Performed by: NURSE PRACTITIONER

## 2022-11-22 PROCEDURE — 87389 HIV-1 AG W/HIV-1&-2 AB AG IA: CPT | Performed by: NURSE PRACTITIONER

## 2022-11-22 PROCEDURE — 80053 COMPREHEN METABOLIC PANEL: CPT | Performed by: NURSE PRACTITIONER

## 2022-11-22 PROCEDURE — 99213 OFFICE O/P EST LOW 20 MIN: CPT | Mod: PBBFAC

## 2022-11-22 PROCEDURE — 36415 COLL VENOUS BLD VENIPUNCTURE: CPT | Performed by: NURSE PRACTITIONER

## 2022-11-22 PROCEDURE — 99214 OFFICE O/P EST MOD 30 MIN: CPT | Mod: S$PBB,,, | Performed by: NURSE PRACTITIONER

## 2022-11-22 PROCEDURE — 1159F MED LIST DOCD IN RCRD: CPT | Mod: CPTII,,, | Performed by: NURSE PRACTITIONER

## 2022-11-22 PROCEDURE — 4010F ACE/ARB THERAPY RXD/TAKEN: CPT | Mod: CPTII,,, | Performed by: NURSE PRACTITIONER

## 2022-11-22 PROCEDURE — 86708 HEPATITIS A ANTIBODY: CPT | Performed by: NURSE PRACTITIONER

## 2022-11-22 RX ORDER — DOXYCYCLINE 100 MG/1
100 CAPSULE ORAL EVERY 12 HOURS
COMMUNITY
Start: 2022-08-04 | End: 2022-12-05 | Stop reason: ALTCHOICE

## 2022-11-22 NOTE — PROGRESS NOTES
Subjective:       Patient ID: Xavier Harmon is a 57 y.o. male.    Chief Complaint: Hepatitis (Hep C follow up )    11/22/22  Xavier is a 58 yo AAM presenting today for HCV initial evaluation.  He was diagnosed 8/22 and referred by PCP DANIE Chery NP.  He is treatment naive.  RUQ abdominal u/s completed 10/19/22, significant for steatosis.  He had FibroScan completed today, pending results.  He was unable to come in for labs, will collect today.  He denies any history of blood transfusion, tattoos, IVDU, or known HCV + sexual partners.  He was a plasma donor in the 1980s.  Lab confirmed HCV infection with 4.8 mil copies of virus 8/18/22.  Will get additional labs & he agrees to return next week for results & treatment plan.      Review of Systems   Constitutional: Negative.    HENT: Negative.     Respiratory: Negative.     Cardiovascular: Negative.    Gastrointestinal: Negative.    Genitourinary: Negative.    Integumentary:  Negative.   Neurological: Negative.    Hematological: Negative.    Psychiatric/Behavioral: Negative.         Objective:      Physical Exam  Vitals reviewed.   Constitutional:       General: He is not in acute distress.     Appearance: Normal appearance. He is not toxic-appearing.   Eyes:      General: No scleral icterus.  Cardiovascular:      Rate and Rhythm: Normal rate and regular rhythm.      Heart sounds: Normal heart sounds.   Pulmonary:      Effort: Pulmonary effort is normal. No respiratory distress.      Breath sounds: Normal breath sounds.   Abdominal:      General: Bowel sounds are normal. There is no distension.      Palpations: Abdomen is soft. There is no mass.      Tenderness: There is no abdominal tenderness.   Musculoskeletal:         General: Normal range of motion.   Skin:     General: Skin is warm and dry.   Neurological:      Mental Status: He is alert and oriented to person, place, and time.       Assessment:       Problem List Items Addressed This Visit          GI     Hepatitis C         Plan:         Hepatitis C virus infection without hepatic coma, unspecified chronicity  -     Ambulatory referral/consult to Infectious Disease  -     ADRIANO IFA Screen With Reflex To Titer and Pattern; Future; Expected date: 11/22/2022  -     CBC Auto Differential; Future; Expected date: 11/22/2022  -     Comprehensive Metabolic Panel; Future; Expected date: 11/22/2022  -     Ferritin; Future; Expected date: 11/22/2022  -     Fibrotest-Actitest, Serum; Future; Expected date: 11/22/2022  -     Hepatitis A antibody, IgG; Future; Expected date: 11/22/2022  -     Hepatitis B Core Antibody, Total; Future; Expected date: 11/22/2022  -     Hepatitis B Surface Ab, Qualitative; Future; Expected date: 11/22/2022  -     Hepatitis B Surface Antigen; Future; Expected date: 11/22/2022  -     Hepatitis C Genotype; Future; Expected date: 11/22/2022  -     Hepatitis C Virus Quantitative; Future; Expected date: 11/22/2022  -     HIV 1/2 Ag/Ab (4th Gen); Future; Expected date: 11/22/2022  -     Protime-INR; Future; Expected date: 11/22/2022  -     SYPHILIS ANTIBODY (WITH REFLEX RPR); Future; Expected date: 11/22/2022  Diagnosed 8/22  Treatment naive.  GT pending, baseline VL 4.8 mil copies  Fibrosure: 11/22/22  FibroScan 11/22/22  RUQ abdominal u/s: 10/22 Steatosis noted.  Blood precautions: do not share a razor, needle, toothbrush, clippers with anyone.  RTC 11/28/22 10:30am, ok to overbook.

## 2022-11-22 NOTE — PROGRESS NOTES
Here for BP Check per JAKE Chery NP    BP Readin/88 manual reading    GA: 100     Last dose of Medications: lisinopril 20 mg taken 22 @ 0700 am.    Complaints:none   Provider sent  Blood Pressure reading.        JAKE Chery NP notified /88 manual reading  . New orders noted. Patient instructed to increase lisinopril t 40 mg daily. It is okay to take 2 of the 20 mg tablets to equal 40 mg dose. Patient instructed on low sodium diet plan., food okay to eat and foods to avoid. RTC in 1 week for BP check nurse visit appointment. Patient voiced understanding of these instructions.                   Discharged home with instructions and information to continue with all prescribed medications,follow up appointments, drink plenty of water daily, maintain low sodium intake and to walk/ exercise daily.Patient voiced understanding of discharge instructions.

## 2022-11-26 LAB — HCV RNA SERPL NAA+PROBE-ACNC: ABNORMAL IU/ML

## 2022-11-28 LAB
A2 MACROGLOB SERPL-MCNC: 213 MG/DL (ref 100–280)
ALT SERPL W P-5'-P-CCNC: 111 U/L (ref 7–55)
ANNOTATION COMMENT IMP: ABNORMAL
APO A-I SERPL-MCNC: 167 MG/DL
AR ANA INTERPRETIVE COMMENT: ABNORMAL
AR ANA PATTERN: ABNORMAL
AR ANA TITER: ABNORMAL
AR ANTINUCLEAR ANTIBODY (ANA), HEP-2, IGG: DETECTED
AR CYTOPLASM PATTERN: ABNORMAL
AR CYTOPLASMIC TITER: ABNORMAL
BILIRUB SERPL-MCNC: 0.5 MG/DL
FIBROSIS STAGE SERPL QL: ABNORMAL
GGT SERPL-CCNC: 282 U/L (ref 8–61)
HAPTOGLOB SERPL NEPH-MCNC: 160 MG/DL (ref 30–200)
HCV GENTYP SERPL NAA+PROBE: 1
LIVER FIBR SCORE SERPL CALC.FIBROSURE: 0.44
LIVER FIBROSIS INTERPRETATION SER-IMP: ABNORMAL
NECROINFLAMMATORY ACT GRADE SERPL QL: ABNORMAL
NECROINFLAMMATORY ACT SCORE SERPL: 0.66
NECROINFLAMMATORY ACTIV INTERP SER-IMP: ABNORMAL
SERIAL #: ABNORMAL

## 2022-12-02 LAB — HCV GENTYP 1 SERPL NAA+PROBE: ABNORMAL

## 2022-12-05 ENCOUNTER — HOSPITAL ENCOUNTER (OUTPATIENT)
Dept: WOUND CARE | Facility: HOSPITAL | Age: 57
Discharge: HOME | End: 2022-12-05
Attending: NURSE PRACTITIONER
Payer: MEDICAID

## 2022-12-05 ENCOUNTER — CLINICAL SUPPORT (OUTPATIENT)
Dept: INTERNAL MEDICINE | Facility: CLINIC | Age: 57
End: 2022-12-05
Payer: MEDICAID

## 2022-12-05 VITALS
SYSTOLIC BLOOD PRESSURE: 170 MMHG | DIASTOLIC BLOOD PRESSURE: 80 MMHG | RESPIRATION RATE: 18 BRPM | BODY MASS INDEX: 29.06 KG/M2 | HEART RATE: 102 BPM | WEIGHT: 203 LBS | HEIGHT: 70 IN | TEMPERATURE: 99 F

## 2022-12-05 VITALS
HEART RATE: 111 BPM | RESPIRATION RATE: 18 BRPM | OXYGEN SATURATION: 98 % | TEMPERATURE: 99 F | DIASTOLIC BLOOD PRESSURE: 88 MMHG | SYSTOLIC BLOOD PRESSURE: 190 MMHG

## 2022-12-05 DIAGNOSIS — S21.209A OPEN WOUND OF MIDLINE OF BACK: ICD-10-CM

## 2022-12-05 DIAGNOSIS — Z98.890 STATUS POST INCISION AND DRAINAGE: ICD-10-CM

## 2022-12-05 DIAGNOSIS — I10 PRIMARY HYPERTENSION: ICD-10-CM

## 2022-12-05 DIAGNOSIS — T81.31XD POSTOPERATIVE DEHISCENCE OF SKIN WOUND, SUBSEQUENT ENCOUNTER: Primary | ICD-10-CM

## 2022-12-05 PROCEDURE — 99213 PR OFFICE/OUTPT VISIT, EST, LEVL III, 20-29 MIN: ICD-10-PCS | Mod: ,,, | Performed by: NURSE PRACTITIONER

## 2022-12-05 PROCEDURE — 3077F PR MOST RECENT SYSTOLIC BLOOD PRESSURE >= 140 MM HG: ICD-10-PCS | Mod: CPTII,,, | Performed by: NURSE PRACTITIONER

## 2022-12-05 PROCEDURE — 99211 OFF/OP EST MAY X REQ PHY/QHP: CPT | Mod: 25,27

## 2022-12-05 PROCEDURE — 3077F SYST BP >= 140 MM HG: CPT | Mod: CPTII,,, | Performed by: NURSE PRACTITIONER

## 2022-12-05 PROCEDURE — 99213 OFFICE O/P EST LOW 20 MIN: CPT | Mod: 25,PBBFAC

## 2022-12-05 PROCEDURE — 99213 OFFICE O/P EST LOW 20 MIN: CPT | Mod: ,,, | Performed by: NURSE PRACTITIONER

## 2022-12-05 RX ORDER — LISINOPRIL 20 MG/1
40 TABLET ORAL EVERY MORNING
Qty: 180 TABLET | Refills: 1 | Status: SHIPPED | OUTPATIENT
Start: 2022-12-05 | End: 2022-12-12

## 2022-12-05 RX ORDER — AMLODIPINE BESYLATE 10 MG/1
10 TABLET ORAL DAILY
Qty: 90 TABLET | Refills: 1 | Status: SHIPPED | OUTPATIENT
Start: 2022-12-05 | End: 2023-02-27 | Stop reason: SDUPTHER

## 2022-12-05 RX ORDER — AMLODIPINE BESYLATE 10 MG/1
10 TABLET ORAL DAILY
Qty: 30 TABLET | Refills: 3 | Status: SHIPPED | OUTPATIENT
Start: 2022-12-05 | End: 2022-12-05 | Stop reason: SDUPTHER

## 2022-12-05 NOTE — PROGRESS NOTES
TODAY'S VISIT NOTE WAS IMPORTED FROM LAST WOUND CLINIC VISIT OF 11/10/2022.  I ATTEST THAT I REVIEWED THE HPI, ROS, LABS, WOUND-RELATED IMAGING, PHYSICAL EXAMINATION, EVALUATION AND PLAN SECTIONS OF IMPORTED NOTE AND REVISED TODAY'S VISIT NOTE TO REFLECT TODAY'S NEW ASSESSMENT FINDINGS AND TODAY'S UPDATES TO WOUND TREATMENT PLAN.          CHIEF COMPLAINT:    Non-healing surgical wound to back      HISTORY OF  PRESENT ILLNESS:  57 y.o. Black or  male being seen today for follow-up of surgical wound to middle upper back, after removal of lipoma by [facility] general surgery on 10/3/2022.  Seen by general surgery on 10/27/2022, when sutures removed from two incisions.  Lipoma and sebaceous cyst removed same day of surgery.  Underwent bedside surgical debridement in wound clinic on 11/4/2022, for evacuation of underlying hematoma.  Current wound care:  irrigating with Dakins 0.25% solution, followed by Silver Alginate Advantage, followed by secondary dressing.  Being done QOD.   Not prescribed any antibiotics presently.  Followed by [facility] for PCP. Followed by [facility] ID clinic for hepatitis C tx.  Got a job in [place] and will be working on an 8-week/2-week schedule.  Trying to schedule all appointments to coincide with that work schedule.  History includes:        Past Medical History:   Diagnosis Date    Chronic pain of right knee 8/18/2022    Cigarette nicotine dependence without complication 6/6/2022    Elevated LFTs 8/26/2022    Hepatitis C antibody test positive 8/18/2022    HTN (hypertension)     Illicit drug use 6/6/2022    Lymphadenopathy 8/18/2022    Mass of skin of back 8/18/2022    Obesity, unspecified     Open wound of midline of back 11/4/2022    Prediabetes 6/6/2022    Primary hypertension 6/6/2022    Primary osteoarthritis of right knee 8/26/2022    Tobacco abuse     Vitamin D deficiency 8/18/2022      Past Surgical History:   Procedure Laterality Date    EXCISION OF MASS OF BACK N/A  "10/3/2022    Procedure: EXCISION, MASS, BACK;  Surgeon: Nick Grewal MD;  Location: [facility];  Service: General;  Laterality: N/A;  Needs prone positioning.      Social History     Socioeconomic History    Marital status: Single   Tobacco Use    Smoking status: Every Day     Packs/day: 0.50     Years: 47.00     Pack years: 23.50     Types: Cigarettes     Start date: 6/21/1975    Smokeless tobacco: Never   Substance and Sexual Activity    Alcohol use: Yes     Comment: occassioanlly    Drug use: Not Currently     Types: "Crack" cocaine    Sexual activity: Not Currently       Review of Most Recent Wound Care-Related Labs:  Lab Results   Component Value Date/Time    WBC 7.6 11/22/2022 01:56 PM    RBC 6.79 (H) 11/22/2022 01:56 PM    HGB 15.5 11/22/2022 01:56 PM    HCT 48.7 11/22/2022 01:56 PM    MCV 71.7 (L) 11/22/2022 01:56 PM    MCH 22.8 (L) 11/22/2022 01:56 PM    CREATININE 0.89 11/22/2022 01:56 PM    HGBA1C 6.1 08/16/2022 09:27 AM        10/3/2022 Pathology:  1. Soft tissue back mass, Excision:  - Lipoma.    2. Back, upper, epidermal inclusion cyst, Excision:  - Epidermal inclusion cyst.          Today 12/5/2022:  Took 2 of his Lisinopril (20 mg) this morning at around 08:30.  Scheduled for a nurse visit in IM clinic @ 11:00 am for BP check.  Has been counseled previously, per self-report, on low salt diet and has been trying to follow.  No reported complications with wound care or current treatment plan.  Has all wound care supplies in home.  Denies fevers, chills, wound odor, wound redness, wound pain, or yellow/green drainage.  No new rashes, lesions, or skin breakdown.     11/10/2022:  Sister has been doing wound care QOD, instead of daily; he was confused about frequency of wound care.  Is unable to come into clinic twice/week for NPWT dressing changes.  Is having difficulty getting to wound clinic on a weekly basis.  Asking for every 2-week visits, due to his ride.  Has all wound care supplies in home.  " Denies fevers, chills, wound odor, wound redness, wound pain, or yellow/green drainage.  No new rashes, lesions, or skin breakdown.     11/4/2022:  Really does not want to go back to surgery to have lower wound re-evaluated.  Denies fevers, chills, wound odor, wound pain, and wound drainage.  Other than one wound to back, no new skin breakdown, rashes, or other skin issues.   Sister will be doing wound care.        REVIEW OF SYSTEMS:  Except as stated in HPI, all other 10 body systems normal.     Current Outpatient Medications   Medication Sig Dispense Refill    diclofenac (VOLTAREN) 75 MG EC tablet Take 1 tablet (75 mg total) by mouth 2 (two) times daily as needed (pain). 20 tablet 0    diclofenac sodium (VOLTAREN) 1 % Gel Apply 2 g topically 4 (four) times daily. To right knee. 100 g 6    doxycycline (VIBRAMYCIN) 100 MG Cap Take 100 mg by mouth every 12 (twelve) hours.      lisinopriL (PRINIVIL,ZESTRIL) 20 MG tablet Take 1 tablet (20 mg total) by mouth every morning. For High Blood Pressure 90 tablet 3     No current facility-administered medications for this encounter.     Facility-Administered Medications Ordered in Other Encounters   Medication Dose Route Frequency Provider Last Rate Last Admin    dextrose 10% bolus 125 mL  12.5 g Intravenous PRN Fide S Del Angel-Rhiannon, FNP        dextrose 10% bolus 125 mL  12.5 g Intravenous PRN Fide S Del Angel-Rhiannon, FNP        insulin aspart U-100 injection 2-9 Units  2-9 Units Subcutaneous PRN Fide S Del Angel-Rhiannon, FNP        insulin aspart U-100 injection 4-12 Units  4-12 Units Subcutaneous PRN Fide S Del Angel-Rhiannon, FNP        LIDOcaine (PF) 10 mg/ml (1%) injection 10 mg  1 mL Intradermal Once Fide S Del Angel-Rhiannon, FNP             PHYSICAL EXAMINATION AND VITAL SIGNS:    Vitals:    12/05/22 0959   BP: (!) 190/88   Pulse: (!) 111   Resp: 18   Temp: 98.6 °F (37 °C)       There is no height or weight on file to calculate BMI.      General:   Hypertensive and tachycardic,  asymptomatic with.   Well-nourished, in no acute distress.   Respiratory:  Breathing even, quiet, and unlabored.  No coughing.  Musculoskeletal:  Full range of motion of all extremities.  Ambulating without assistive device.   Neurologic:  A&O X 3.  Cranial nerves grossly intact.     Psychiatric:  Calm, cooperative.  Mood and effect normal.  Responses appropriate.   Integumentary:      Surgical wound to mid-back:  Wound is 100% epithelialized, in a dimple-like fashion.  No erythema, purulent drainage, periwound edema, odors, induration, bogginess, or fluctuance.  Periwound skin hyperpigmented.                    ASSESSMENT AND PLAN:    Status post excisional removal of sebaceous cyst and lipoma from mid-upper back by [facility] general surgery on 10/3/2022.    Did well post-operatively; however, did require evacuation of hematoma here in wound clinic.        Surgical wound to mid-upper back:  Wound is healed today, in a dimple-like fashion.    Underwent surgical debridement at bedside here in wound clinic 11/4/2022 to remove underlying non-viable tissue and hematoma.    Instructed him he can clean with  mild soap and water, pat dry, and leave MIGUELITO.      Uncontrolled hypertension:  190/88 this morning.  Scheduled for nurse visit in IM clinic for BP check @ 11:00.  Prescribed Lisinopril 40 mg daily, which he took at around 8:30 a.m.  Teaching provided on role salt plays in hypertension, and importance of following low salt diet.   Instructed on long-term complications with uncontrolled diabetes and what his goal BP should be, and what his reading today was.   Communication sent to PCP, JORDAN Iraheta, with today's BP reading.      Pre-diabetes:  8/16/2022:  HgbA1C 6.1%.   Being monitored by PCP.      Elevated Liver Enzymes with Untreated Hepatitis C:  8/18/2022:  HCV RNA viral load was 4,800,000  8/16/2022:  AST 71.  ALT 94.    Being followed by [facility] ID clinic for tx.  Has had his initial visit.     Hx of illicit drug  use:  Last drug screen on chart dated 7/8/2018 was positive for cocaine.                                                       We will see Mr. Harmon on an as-needed basis for any future wound-related issues.

## 2022-12-05 NOTE — PROGRESS NOTES
Here for BP Check per  JAKE Chery NP    BP Readin/80 manual reading    IA:102    Last dose of Medications: Lisinopril 40 mg ( 2 of the 20 mg lisinopril  tablets)  taken 22 @  0830 am.    Complaints: none.   Provider sent  Blood Pressure reading.           JAKE Chery NP notified /80 manual reading . Patient has not been following strict low sodium diet. Patient has been under stress. He was unemployed. Patient has a new job . He will start in about 1 to 2 weeks form today. He will be out of town for at least 8 weeks with new job. Patient told nurse he will work harder at following low sodium diet plan , drinking more water and exercising. His stress level is better now that he is employed. New orders noted. New Rx e-scripted to pharmacy for Amlodipine 10 mg one tablet daily. Continue Lisinopril 40 mg  ( 2 of 20 mg lisinopril tablets) daily. Patient request provider send 90 day prescriptions  to pharmacy for his medications .  He will be out of town for  at least 8 weeks at a time due to work. RTC in 1 week for BP check nurse visit  appointment. Patient voiced understanding of all these instructions.                     Discharged home with instructions and information to continue with all prescribed medications,follow up appointments, drink plenty of water daily, maintain low sodium intake and to walk/ exercise daily.Patient voiced understanding of discharge instructions.

## 2022-12-12 ENCOUNTER — CLINICAL SUPPORT (OUTPATIENT)
Dept: INTERNAL MEDICINE | Facility: CLINIC | Age: 57
End: 2022-12-12
Payer: MEDICAID

## 2022-12-12 ENCOUNTER — OFFICE VISIT (OUTPATIENT)
Dept: INFECTIOUS DISEASES | Facility: CLINIC | Age: 57
End: 2022-12-12
Payer: MEDICAID

## 2022-12-12 VITALS
TEMPERATURE: 99 F | HEIGHT: 70 IN | RESPIRATION RATE: 16 BRPM | HEIGHT: 70 IN | WEIGHT: 204 LBS | BODY MASS INDEX: 29.2 KG/M2 | HEART RATE: 100 BPM | DIASTOLIC BLOOD PRESSURE: 70 MMHG | WEIGHT: 202 LBS | BODY MASS INDEX: 28.92 KG/M2 | HEART RATE: 103 BPM | TEMPERATURE: 98 F | SYSTOLIC BLOOD PRESSURE: 133 MMHG | RESPIRATION RATE: 18 BRPM | SYSTOLIC BLOOD PRESSURE: 120 MMHG | OXYGEN SATURATION: 97 % | DIASTOLIC BLOOD PRESSURE: 84 MMHG

## 2022-12-12 DIAGNOSIS — Z23 NEED FOR VACCINATION: ICD-10-CM

## 2022-12-12 DIAGNOSIS — I10 PRIMARY HYPERTENSION: Primary | ICD-10-CM

## 2022-12-12 DIAGNOSIS — B18.2 CHRONIC HEPATITIS C WITHOUT HEPATIC COMA: Primary | ICD-10-CM

## 2022-12-12 PROCEDURE — 3078F PR MOST RECENT DIASTOLIC BLOOD PRESSURE < 80 MM HG: ICD-10-PCS | Mod: CPTII,,, | Performed by: NURSE PRACTITIONER

## 2022-12-12 PROCEDURE — 3008F PR BODY MASS INDEX (BMI) DOCUMENTED: ICD-10-PCS | Mod: CPTII,,, | Performed by: NURSE PRACTITIONER

## 2022-12-12 PROCEDURE — 90750 HZV VACC RECOMBINANT IM: CPT | Mod: PBBFAC

## 2022-12-12 PROCEDURE — 3008F BODY MASS INDEX DOCD: CPT | Mod: CPTII,,, | Performed by: NURSE PRACTITIONER

## 2022-12-12 PROCEDURE — 99213 OFFICE O/P EST LOW 20 MIN: CPT | Mod: PBBFAC

## 2022-12-12 PROCEDURE — 1160F RVW MEDS BY RX/DR IN RCRD: CPT | Mod: CPTII,,, | Performed by: NURSE PRACTITIONER

## 2022-12-12 PROCEDURE — 4010F ACE/ARB THERAPY RXD/TAKEN: CPT | Mod: CPTII,,, | Performed by: NURSE PRACTITIONER

## 2022-12-12 PROCEDURE — 99214 OFFICE O/P EST MOD 30 MIN: CPT | Mod: S$PBB,,, | Performed by: NURSE PRACTITIONER

## 2022-12-12 PROCEDURE — 3079F DIAST BP 80-89 MM HG: CPT | Mod: CPTII,,, | Performed by: NURSE PRACTITIONER

## 2022-12-12 PROCEDURE — 4010F PR ACE/ARB THEARPY RXD/TAKEN: ICD-10-PCS | Mod: CPTII,,, | Performed by: NURSE PRACTITIONER

## 2022-12-12 PROCEDURE — 1159F MED LIST DOCD IN RCRD: CPT | Mod: CPTII,,, | Performed by: NURSE PRACTITIONER

## 2022-12-12 PROCEDURE — 90472 IMMUNIZATION ADMIN EACH ADD: CPT | Mod: PBBFAC

## 2022-12-12 PROCEDURE — 3078F DIAST BP <80 MM HG: CPT | Mod: CPTII,,, | Performed by: NURSE PRACTITIONER

## 2022-12-12 PROCEDURE — 99214 PR OFFICE/OUTPT VISIT, EST, LEVL IV, 30-39 MIN: ICD-10-PCS | Mod: S$PBB,,, | Performed by: NURSE PRACTITIONER

## 2022-12-12 PROCEDURE — 3079F PR MOST RECENT DIASTOLIC BLOOD PRESSURE 80-89 MM HG: ICD-10-PCS | Mod: CPTII,,, | Performed by: NURSE PRACTITIONER

## 2022-12-12 PROCEDURE — 3074F PR MOST RECENT SYSTOLIC BLOOD PRESSURE < 130 MM HG: ICD-10-PCS | Mod: CPTII,,, | Performed by: NURSE PRACTITIONER

## 2022-12-12 PROCEDURE — 1160F PR REVIEW ALL MEDS BY PRESCRIBER/CLIN PHARMACIST DOCUMENTED: ICD-10-PCS | Mod: CPTII,,, | Performed by: NURSE PRACTITIONER

## 2022-12-12 PROCEDURE — 3074F SYST BP LT 130 MM HG: CPT | Mod: CPTII,,, | Performed by: NURSE PRACTITIONER

## 2022-12-12 PROCEDURE — 1159F PR MEDICATION LIST DOCUMENTED IN MEDICAL RECORD: ICD-10-PCS | Mod: CPTII,,, | Performed by: NURSE PRACTITIONER

## 2022-12-12 PROCEDURE — 99214 OFFICE O/P EST MOD 30 MIN: CPT | Mod: PBBFAC,27 | Performed by: NURSE PRACTITIONER

## 2022-12-12 RX ORDER — LISINOPRIL 40 MG/1
40 TABLET ORAL DAILY
Qty: 90 TABLET | Refills: 1 | Status: SHIPPED | OUTPATIENT
Start: 2022-12-12 | End: 2023-02-27 | Stop reason: SDUPTHER

## 2022-12-12 RX ORDER — VELPATASVIR AND SOFOSBUVIR 100; 400 MG/1; MG/1
1 TABLET, FILM COATED ORAL DAILY
Qty: 84 TABLET | Refills: 0 | Status: SHIPPED | OUTPATIENT
Start: 2022-12-12 | End: 2024-01-30

## 2022-12-12 NOTE — PROGRESS NOTES
Here for BP Check per  JAKE Chery NP    BP Readin/84    [place]:100    Last dose of Medications:Amlodipine 10 mg taken 22 @ 0700 am. Lisinopril 20 mg tablet ( 2 tablets ) equal 40 mg dose last taken 12/10/22 @ 0700 am.Patient ran out of medication. Went to fill this at pharmacy . Pharmacy wanted him to pay $59.00 or ask his provider to fill out a form. They would pay for one lisinopril 20 mg daily  (not two).   Patient is going to work out of state and needs prescriptions written for 90 days. Patient told nurse his anxiety level has decreased since he found a job.  Complaints:none.   Provider sent  Blood Pressure reading.         JAKE Chery NP notified /84 . Notified of the above information regarding lisinopril refill . New Rx e-scripted to pharmacy for Lisinopril 40 mg one tablet daily , sent a 90 day prescription. Amlodipine was already prescribed for 90 days.                Discharged home with instructions and information to continue with all prescribed medications,follow up appointments, drink plenty of water daily, maintain low sodium intake and to walk/ exercise daily.Patient voiced understanding of discharge instructions.

## 2022-12-12 NOTE — PROGRESS NOTES
Subjective:       Patient ID: Xavier Harmon is a 57 y.o. male.    Chief Complaint: HEP C    12/12/22  Xavier is a 57 AAM presenting today for HCV f/u visit & lab results.  He is eager to start treatment asap and tells me that he interviewed for a job today.  He will have pre-employment physical tomorrow & hopes to start job next week.  He will be working in [place], 1st hitch will be about 6 weeks.  He agrees to take Epclusa daily as prescribed and will either arrange for someone to mail it to him, or delay start by a couple of weeks so that he is back for 4 week refill  & avoid treatment interruption.  He appreciates starting Twinrix vaccination series & 2nd dose of Shingrix today.  All questions answered & concerns addressed.     11/22/22  Xavier is a 56 yo AAM presenting today for HCV initial evaluation.  He was diagnosed 8/22 and referred by PCP DANIE Chery NP.  He is treatment naive.  RUQ abdominal u/s completed 10/19/22, significant for steatosis.  He had FibroScan completed today, pending results.  He was unable to come in for labs, will collect today.  He denies any history of blood transfusion, tattoos, IVDU, or known HCV + sexual partners.  He was a plasma donor in the 1980s.  Lab confirmed HCV infection with 4.8 mil copies of virus 8/18/22.  Will get additional labs & he agrees to return next week for results & treatment plan.      Review of Systems   Constitutional: Negative.    HENT: Negative.     Respiratory: Negative.     Cardiovascular: Negative.    Gastrointestinal: Negative.    Genitourinary: Negative.    Integumentary:  Negative.   Neurological: Negative.    Hematological: Negative.    Psychiatric/Behavioral: Negative.         Objective:      Physical Exam  Vitals reviewed.   Constitutional:       General: He is not in acute distress.     Appearance: Normal appearance. He is not toxic-appearing.   Eyes:      General: No scleral icterus.  Cardiovascular:      Rate and Rhythm: Normal rate and  regular rhythm.      Heart sounds: Normal heart sounds.   Pulmonary:      Effort: Pulmonary effort is normal. No respiratory distress.      Breath sounds: Normal breath sounds.   Abdominal:      General: Bowel sounds are normal. There is no distension.      Palpations: Abdomen is soft. There is no mass.      Tenderness: There is no abdominal tenderness.   Musculoskeletal:         General: Normal range of motion.   Skin:     General: Skin is warm and dry.   Neurological:      Mental Status: He is alert and oriented to person, place, and time.       Assessment:       Problem List Items Addressed This Visit    None      Plan:           Chronic hepatitis C without hepatic coma  -     sofosbuvir-velpatasvir 400-100 mg Tab; Take 1 tablet by mouth once daily.  Dispense: 84 tablet; Refill: 0  Diagnosed 8/22  Treatment naive.  GT 1a, baseline VL 3.19 mil copies.  Fibrosure: A3, F1-2.  FibroScan 11/22/22, results pending.  RUQ abdominal u/s: 10/22 steatosis.  Blood precautions: do not share a razor, needle, toothbrush, clippers with anyone.  Epclusa 1 po daily x 12 weeks.   Refer to HCV  to initiate PA & treatment protocol.   RTC approximately 6 weeks with Lori, will plan around his work schedule.     Need for vaccination  -     Zoster Recombinant Vaccine  -     Hepatitis A / Hepatitis B Combined Vaccine (IM); Future  Shingrix #2 & Twinrix #1 today.  Twinrix #2 next visit, #3 due 6/12/23.

## 2022-12-12 NOTE — Clinical Note
Patient's medicaid would not pay for prescription of lisinopril 20 mg tablets, patient to take 2 tablets. Told pharmacy provider needed to fill out a form for this.

## 2022-12-14 ENCOUNTER — TELEPHONE (OUTPATIENT)
Dept: INFECTIOUS DISEASES | Facility: CLINIC | Age: 57
End: 2022-12-14
Payer: MEDICAID

## 2022-12-14 NOTE — TELEPHONE ENCOUNTER
Spoke with patient to try and schedule Epclusa Teaching.  Pt is going to be working out of town for work for 8 wks and will call to meet with me when he has his dates for work, so we can coordinate his Epclusa schedule.

## 2022-12-23 DIAGNOSIS — B18.2 CHRONIC HEPATITIS C WITHOUT HEPATIC COMA: Primary | ICD-10-CM

## 2022-12-27 ENCOUNTER — CLINICAL SUPPORT (OUTPATIENT)
Dept: INFECTIOUS DISEASES | Facility: CLINIC | Age: 57
End: 2022-12-27
Payer: MEDICAID

## 2022-12-27 DIAGNOSIS — B18.2 CHRONIC HEPATITIS C WITHOUT HEPATIC COMA: Primary | ICD-10-CM

## 2022-12-27 NOTE — PROGRESS NOTES
Pt came to clinic today for Epclusa teaching.  Pt was given Epclusa (all 3 boxes-90 tabs), by me.  Explained Hep C summary form to patient as well as to take Epclusa 1 tab daily. Informed patient to make sure to have next month's Epclusa available before running out and to increase fluids to help with possible side effects of headaches and fatigue. Also Informed patient that it is okay to take ibuprofen for headaches, no alcohol intake, and to call the clinic or pharmacy before starting any new prescriptions.  Notified that labs are due every 4 weeks while on treatment.  Hep C summary form, lab orders/dates, and appointment date/times given to patient.  Patient verbalized understanding of all the above information. Patient took first dose of Epclusa while in clinic. Tolerated well.

## 2022-12-29 ENCOUNTER — TELEPHONE (OUTPATIENT)
Dept: INTERNAL MEDICINE | Facility: CLINIC | Age: 57
End: 2022-12-29
Payer: MEDICAID

## 2022-12-29 NOTE — TELEPHONE ENCOUNTER
Pt stated that he last office visit a specific allergy OTC medication was recommended to him. Pt does not remember name of medication and wants to purchase proper medication so that there are no adverse effects. Pt asked if there's a particular allergy medication provider can recommend.     Please advise    Reason for Call:  Other referral    Detailed comments: patient was seen regarding skin condition and was given medication to try. Medication did nothing for the rash, patient would like to be referred to a dermatologist for the skin condition    Phone Number Patient can be reached at: Home number on file [phone] (home)    Best Time:     Can we leave a detailed message on this number? YES    Call taken on 6/18/2019 at 12:16 PM by Angelita Don

## 2023-01-10 DIAGNOSIS — G89.29 CHRONIC PAIN OF RIGHT KNEE: ICD-10-CM

## 2023-01-10 DIAGNOSIS — M25.561 CHRONIC PAIN OF RIGHT KNEE: ICD-10-CM

## 2023-01-10 RX ORDER — DICLOFENAC SODIUM 10 MG/G
2 GEL TOPICAL 4 TIMES DAILY
Qty: 100 G | Refills: 6 | OUTPATIENT
Start: 2023-01-10

## 2023-01-10 NOTE — TELEPHONE ENCOUNTER
Pt called back and asked that you send the medication to [facility] pharmacy in his chart and he will have his sister or mother pick it up and mail it to him in [place].

## 2023-01-10 NOTE — TELEPHONE ENCOUNTER
[place] 2/27/23  Pt. Called requesting refill on Voltaren Gel. He's working in [place], [place] and asked if you would send it to the [facility] pharmacy over there. I added it to his pharnacy list,. Ph # [phone]. Thanks

## 2023-01-11 RX ORDER — DICLOFENAC SODIUM 10 MG/G
2 GEL TOPICAL 4 TIMES DAILY
Qty: 100 G | Refills: 6 | Status: SHIPPED | OUTPATIENT
Start: 2023-01-11 | End: 2023-02-27 | Stop reason: SDUPTHER

## 2023-02-20 NOTE — PROCEDURES
Fibroscan Procedure     Name: Xavier Harmon  Date of Procedure : 2022  Interpreting Physician: Linda Parsons MD, MPH  Diagnosis: HCV    Probe: XL    Fibroscan reading: 10.4 kPa    Fibrosis: F3     CAP readin dB/m    Steatosis: S2-3      Miscellaneous:

## 2023-02-27 ENCOUNTER — OFFICE VISIT (OUTPATIENT)
Dept: INTERNAL MEDICINE | Facility: CLINIC | Age: 58
End: 2023-02-27
Payer: MEDICAID

## 2023-02-27 ENCOUNTER — LAB VISIT (OUTPATIENT)
Dept: LAB | Facility: HOSPITAL | Age: 58
End: 2023-02-27
Payer: MEDICAID

## 2023-02-27 DIAGNOSIS — Z00.00 WELLNESS EXAMINATION: ICD-10-CM

## 2023-02-27 DIAGNOSIS — R79.89 ELEVATED LFTS: ICD-10-CM

## 2023-02-27 DIAGNOSIS — F17.210 CIGARETTE NICOTINE DEPENDENCE WITHOUT COMPLICATION: Chronic | ICD-10-CM

## 2023-02-27 DIAGNOSIS — M25.561 CHRONIC PAIN OF RIGHT KNEE: ICD-10-CM

## 2023-02-27 DIAGNOSIS — I10 PRIMARY HYPERTENSION: Primary | ICD-10-CM

## 2023-02-27 DIAGNOSIS — Z12.5 PROSTATE CANCER SCREENING: ICD-10-CM

## 2023-02-27 DIAGNOSIS — I10 PRIMARY HYPERTENSION: ICD-10-CM

## 2023-02-27 DIAGNOSIS — G89.29 CHRONIC PAIN OF RIGHT KNEE: ICD-10-CM

## 2023-02-27 LAB
ALBUMIN SERPL-MCNC: 3.6 G/DL (ref 3.5–5)
ALBUMIN/GLOB SERPL: 0.9 RATIO (ref 1.1–2)
ALP SERPL-CCNC: 71 UNIT/L (ref 40–150)
ALT SERPL-CCNC: 22 UNIT/L (ref 0–55)
AST SERPL-CCNC: 24 UNIT/L (ref 5–34)
BASOPHILS # BLD AUTO: 0.04 X10(3)/MCL (ref 0–0.2)
BASOPHILS NFR BLD AUTO: 0.5 %
BILIRUBIN DIRECT+TOT PNL SERPL-MCNC: 0.4 MG/DL
BUN SERPL-MCNC: 12.7 MG/DL (ref 8.4–25.7)
CALCIUM SERPL-MCNC: 9.3 MG/DL (ref 8.4–10.2)
CHLORIDE SERPL-SCNC: 106 MMOL/L (ref 98–107)
CO2 SERPL-SCNC: 23 MMOL/L (ref 22–29)
CREAT SERPL-MCNC: 0.88 MG/DL (ref 0.73–1.18)
EOSINOPHIL # BLD AUTO: 0.18 X10(3)/MCL (ref 0–0.9)
EOSINOPHIL NFR BLD AUTO: 2.1 %
ERYTHROCYTE [DISTWIDTH] IN BLOOD BY AUTOMATED COUNT: 17.4 % (ref 11.5–17)
GFR SERPLBLD CREATININE-BSD FMLA CKD-EPI: >60 MLS/MIN/1.73/M2
GLOBULIN SER-MCNC: 3.8 GM/DL (ref 2.4–3.5)
GLUCOSE SERPL-MCNC: 105 MG/DL (ref 74–100)
HCT VFR BLD AUTO: 41.9 % (ref 42–52)
HGB BLD-MCNC: 13.7 G/DL (ref 14–18)
IMM GRANULOCYTES # BLD AUTO: 0.03 X10(3)/MCL (ref 0–0.04)
IMM GRANULOCYTES NFR BLD AUTO: 0.3 %
LYMPHOCYTES # BLD AUTO: 2.93 X10(3)/MCL (ref 0.6–4.6)
LYMPHOCYTES NFR BLD AUTO: 33.6 %
MCH RBC QN AUTO: 22.6 PG
MCHC RBC AUTO-ENTMCNC: 32.7 G/DL (ref 33–36)
MCV RBC AUTO: 69.1 FL (ref 80–94)
MONOCYTES # BLD AUTO: 0.57 X10(3)/MCL (ref 0.1–1.3)
MONOCYTES NFR BLD AUTO: 6.5 %
NEUTROPHILS # BLD AUTO: 4.98 X10(3)/MCL (ref 2.1–9.2)
NEUTROPHILS NFR BLD AUTO: 57 %
NRBC BLD AUTO-RTO: 0 %
PLATELET # BLD AUTO: 212 X10(3)/MCL (ref 130–400)
PMV BLD AUTO: 10.2 FL (ref 7.4–10.4)
POTASSIUM SERPL-SCNC: 3.6 MMOL/L (ref 3.5–5.1)
PROT SERPL-MCNC: 7.4 GM/DL (ref 6.4–8.3)
RBC # BLD AUTO: 6.06 X10(6)/MCL (ref 4.7–6.1)
SODIUM SERPL-SCNC: 141 MMOL/L (ref 136–145)
WBC # SPEC AUTO: 8.7 X10(3)/MCL (ref 4.5–11.5)

## 2023-02-27 PROCEDURE — 80053 COMPREHEN METABOLIC PANEL: CPT

## 2023-02-27 PROCEDURE — 1159F PR MEDICATION LIST DOCUMENTED IN MEDICAL RECORD: ICD-10-PCS | Mod: CPTII,95,, | Performed by: NURSE PRACTITIONER

## 2023-02-27 PROCEDURE — 99406 BEHAV CHNG SMOKING 3-10 MIN: CPT | Mod: 95,,, | Performed by: NURSE PRACTITIONER

## 2023-02-27 PROCEDURE — 1160F PR REVIEW ALL MEDS BY PRESCRIBER/CLIN PHARMACIST DOCUMENTED: ICD-10-PCS | Mod: CPTII,95,, | Performed by: NURSE PRACTITIONER

## 2023-02-27 PROCEDURE — 99214 PR OFFICE/OUTPT VISIT, EST, LEVL IV, 30-39 MIN: ICD-10-PCS | Mod: 95,25,, | Performed by: NURSE PRACTITIONER

## 2023-02-27 PROCEDURE — 85025 COMPLETE CBC W/AUTO DIFF WBC: CPT

## 2023-02-27 PROCEDURE — 1159F MED LIST DOCD IN RCRD: CPT | Mod: CPTII,95,, | Performed by: NURSE PRACTITIONER

## 2023-02-27 PROCEDURE — 99406 PR TOBACCO USE CESSATION INTERMEDIATE 3-10 MINUTES: ICD-10-PCS | Mod: 95,,, | Performed by: NURSE PRACTITIONER

## 2023-02-27 PROCEDURE — 1160F RVW MEDS BY RX/DR IN RCRD: CPT | Mod: CPTII,95,, | Performed by: NURSE PRACTITIONER

## 2023-02-27 PROCEDURE — 36415 COLL VENOUS BLD VENIPUNCTURE: CPT

## 2023-02-27 PROCEDURE — 99214 OFFICE O/P EST MOD 30 MIN: CPT | Mod: 95,25,, | Performed by: NURSE PRACTITIONER

## 2023-02-27 RX ORDER — DICLOFENAC SODIUM 10 MG/G
2 GEL TOPICAL 4 TIMES DAILY
Qty: 100 G | Refills: 6 | Status: SHIPPED | OUTPATIENT
Start: 2023-02-27

## 2023-02-27 RX ORDER — AMLODIPINE BESYLATE 10 MG/1
10 TABLET ORAL DAILY
Qty: 90 TABLET | Refills: 1 | Status: SHIPPED | OUTPATIENT
Start: 2023-02-27 | End: 2023-10-03 | Stop reason: SDUPTHER

## 2023-02-27 RX ORDER — LISINOPRIL 40 MG/1
40 TABLET ORAL DAILY
Qty: 90 TABLET | Refills: 1 | Status: SHIPPED | OUTPATIENT
Start: 2023-02-27 | End: 2023-10-03 | Stop reason: SDUPTHER

## 2023-02-27 NOTE — PROGRESS NOTES
GENTRY Westbrook   [facility]  [address] [address]      PATIENT NAME: Xavier Harmon  : 1965  DATE: 23  MRN: [id number]      Reason for Visit / Chief Complaint: Follow-up (Lab review / med refills)       History of Present Illness / Problem Focused Workflow     Xavier Harmon presents to the clinic with Follow-up (Lab review / med refills)     57 yo male here today for f/u. PMH HTN, HCV, and Tobacco use (1/2 ppd), 30 ppy hx).    Prostate Cancer Screening - 22 PSA, Follow up Annually  Colon Cancer Screening - 22 FIT Test  Osteoporosis Screening - 22 Vitamin D level  STI Screening - [facility]     23  Pt here today for routine 6 month f/u eulalio via audio with labs completed this AM. Labs reviewed and dicussed with no questions or concerns today. Meds reviewed  and refilled approprietly. Will f/u in 6 months F2F with routine wellness labs to evaluate. Denies any acute issues today.   Denies chest pain, shortness of breath, cough, fever, headache, dizziness, weakness, abdominal pain, nausea, vomiting, diarrhea, constipation, black/bloody stools, unplanned weight loss, night sweats, changes in urinary patterns, burning/odor with urination, depression, anxiety, and SI/HI.         Review of Systems     Review of Systems   Constitutional: Negative.    HENT: Negative.     Eyes: Negative.    Respiratory: Negative.     Cardiovascular: Negative.    Gastrointestinal: Negative.    Endocrine: Negative.    Genitourinary: Negative.    Neurological: Negative.    Psychiatric/Behavioral: Negative.         Medications and Allergies     Medications  Medication List with Changes/Refills   Current Medications    DICLOFENAC (VOLTAREN) 75 MG EC TABLET    Take 1 tablet (75 mg total) by mouth 2 (two) times daily as needed (pain).    SOFOSBUVIR-VELPATASVIR 400-100 MG TAB    Take 1 tablet by mouth once daily.   Changed and/or  Refilled Medications    Modified Medication Previous Medication    AMLODIPINE (NORVASC) 10 MG TABLET amLODIPine (NORVASC) 10 MG tablet       Take 1 tablet (10 mg total) by mouth once daily. For High Blood Pressure    Take 1 tablet (10 mg total) by mouth once daily. For High Blood Pressure    DICLOFENAC SODIUM (VOLTAREN) 1 % GEL diclofenac sodium (VOLTAREN) 1 % Gel       Apply 2 g topically 4 (four) times daily. To right knee.    Apply 2 g topically 4 (four) times daily. To right knee.    LISINOPRIL (PRINIVIL,ZESTRIL) 40 MG TABLET lisinopriL (PRINIVIL,ZESTRIL) 40 MG tablet       Take 1 tablet (40 mg total) by mouth once daily. For High Blood Pressure    Take 1 tablet (40 mg total) by mouth once daily. For High Blood Pressure         Allergies  Review of patient's allergies indicates:  No Known Allergies    Physical Examination   There were no vitals filed for this visit.  Physical Exam  HENT:      Right Ear: Hearing normal.      Left Ear: Hearing normal.   Neurological:      Mental Status: He is alert and oriented to person, place, and time.   Psychiatric:         Mood and Affect: Mood normal.         Results     Lab Results   Component Value Date    WBC 8.7 02/27/2023    RBC 6.06 02/27/2023    HGB 13.7 (L) 02/27/2023    HCT 41.9 (L) 02/27/2023    MCV 69.1 (L) 02/27/2023    MCH 22.6 02/27/2023    MCHC 32.7 (L) 02/27/2023    RDW 17.4 (H) 02/27/2023     02/27/2023    MPV 10.2 02/27/2023     Sodium Level   Date Value Ref Range Status   02/27/2023 141 136 - 145 mmol/L Final     Potassium Level   Date Value Ref Range Status   02/27/2023 3.6 3.5 - 5.1 mmol/L Final     Carbon Dioxide   Date Value Ref Range Status   02/27/2023 23 22 - 29 mmol/L Final     Blood Urea Nitrogen   Date Value Ref Range Status   02/27/2023 12.7 8.4 - 25.7 mg/dL Final     Creatinine   Date Value Ref Range Status   02/27/2023 0.88 0.73 - 1.18 mg/dL Final     Calcium Level Total   Date Value Ref Range Status   02/27/2023 9.3 8.4 - 10.2 mg/dL  Final     Albumin Level   Date Value Ref Range Status   02/27/2023 3.6 3.5 - 5.0 g/dL Final     Bilirubin Total   Date Value Ref Range Status   02/27/2023 0.4 <=1.5 mg/dL Final     Alkaline Phosphatase   Date Value Ref Range Status   02/27/2023 71 40 - 150 unit/L Final     Aspartate Aminotransferase   Date Value Ref Range Status   02/27/2023 24 5 - 34 unit/L Final     Alanine Aminotransferase   Date Value Ref Range Status   02/27/2023 22 0 - 55 unit/L Final     Estimated GFR-Non    Date Value Ref Range Status   07/08/2018 >60 mL/min/1.73 m2 Final     Lab Results   Component Value Date    CHOL 177 08/16/2022     Lab Results   Component Value Date    HDL 61 (H) 08/16/2022     No results found for: LDLCALC  Lab Results   Component Value Date    TRIG 108 08/16/2022     No results found for: CHOLHDL  No results found for: TSH  Lab Results   Component Value Date    PHUR 7.0 01/22/2021    PROTEINUA 1+ (A) 02/27/2023    GLUCUA Negative 01/22/2021    KETONESU Trace (A) 01/22/2021    OCCULTUA Negative 01/22/2021    NITRITE Negative 01/22/2021    LEUKOCYTESUR Negative 02/27/2023     Lab Results   Component Value Date    HGBA1C 6.1 08/16/2022           Assessment         ICD-10-CM ICD-9-CM   1. Primary hypertension  I10 401.9   2. Wellness examination  Z00.00 V70.0   3. Prostate cancer screening  Z12.5 V76.44   4. Cigarette nicotine dependence without complication  F17.210 305.1   5. Chronic pain of right knee  M25.561 719.46    G89.29 338.29       Plan      Problem List Items Addressed This Visit          Cardiac/Vascular    Primary hypertension - Primary (Chronic)    Current Assessment & Plan     Rx lisinopril 40 mg daily   RX amlodipine 10 mg daily   Low Sodium Diet (Dash Diet - less than 2 grams of sodium per day).  Maintain healthy weight with goal BMI <30. Exercise 30 minutes per day 5 days per week.           Relevant Medications    amLODIPine (NORVASC) 10 MG tablet    lisinopriL (PRINIVIL,ZESTRIL) 40 MG  tablet       Orthopedic    Chronic pain of right knee    Current Assessment & Plan     RX Voltaren gel p  Knee X-rays on Chart  Perform knee stretches daily  Exercise as tolerated (low impact)  Avoid activities that increase the pain or cause stiffness.  Apply heating pad, ice pack, OTC topical as needed.  Massage to loosen muscles.  Continues NSAID as needed  F/U if worsening            Relevant Medications    diclofenac sodium (VOLTAREN) 1 % Gel       Other    Cigarette nicotine dependence without complication (Chronic)    Current Assessment & Plan     Smoking cessation discussed > 3 mins.  Pt not ready to quit.  Discussed benefits of quitting including improved health, decreased cardiac/vascular/pulmonary/stroke risks as well as saving money.            Other Visit Diagnoses       Wellness examination        Relevant Orders    CBC Auto Differential    Comprehensive Metabolic Panel    Lipid Panel    TSH    T4, Free    Vitamin D    Urinalysis, Reflex to Urine Culture    Hemoglobin A1C    Prostate cancer screening        Relevant Orders    PSA, Screening            Future Appointments   Date Time Provider Department Center   2/27/2023 12:00 PM GENTRY Westbrook [facility]   3/3/2023  9:50 AM JORDAN Greer [facility]   3/30/2023  9:30 AM JORDAN Greer [facility]   6/29/2023 10:10 AM JORDAN Greer [facility]            Signature:     [facility]  [address] [address]    Date of encounter: 2/27/23    Established Patient - Audio Only Telehealth Visit     The patient location is: car  The chief complaint leading to consultation is: 6 month f/u   Visit type: Virtual visit with audio only (telephone)  Total time spent with patient: 7 mins       The reason for the audio only service rather than synchronous audio and video virtual visit was related to  technical difficulties or patient preference/necessity.     Each patient to whom I provide medical services by telemedicine is:  (1) informed of the relationship between the physician and patient and the respective role of any other health care provider with respect to management of the patient; and (2) notified that they may decline to receive medical services by telemedicine and may withdraw from such care at any time. Patient verbally consented to receive this service via voice-only telephone call.           This service was not originating from a related E/M service provided within the previous 7 days nor will  to an E/M service or procedure within the next 24 hours or my soonest available appointment.  Prevailing standard of care was able to be met in this audio-only visit.

## 2023-02-27 NOTE — ASSESSMENT & PLAN NOTE
RX Voltaren gel p  Knee X-rays on Chart  Perform knee stretches daily  Exercise as tolerated (low impact)  Avoid activities that increase the pain or cause stiffness.  Apply heating pad, ice pack, OTC topical as needed.  Massage to loosen muscles.  Continues NSAID as needed  F/U if worsening

## 2023-02-27 NOTE — ASSESSMENT & PLAN NOTE
Rx lisinopril 40 mg daily   RX amlodipine 10 mg daily   Low Sodium Diet (Dash Diet - less than 2 grams of sodium per day).  Maintain healthy weight with goal BMI <30. Exercise 30 minutes per day 5 days per week.

## 2023-03-02 ENCOUNTER — TELEPHONE (OUTPATIENT)
Dept: INFECTIOUS DISEASES | Facility: CLINIC | Age: 58
End: 2023-03-02
Payer: MEDICAID

## 2023-03-02 NOTE — TELEPHONE ENCOUNTER
Last visit with Lori Vital, APRN: 12/12/2022  Last visit in [facility] INFECTIOUS DISEASE: 12/27/2022    Patient's next visit in [facility] INFECTIOUS DISEASE: 3/3/2023           ----- Message from Fela Gil sent at 3/2/2023  9:10 AM CST -----  Regarding: Test result  Contact: Xavier Vital -PT     Pt called to find out if the medication he's taking is showing any decrease in his numbers from on his labs.    Please called to advise : [phone]    Thanks

## 2023-03-02 NOTE — TELEPHONE ENCOUNTER
Attempted to call pt: He has an 8 week appointment tomorrow with Lori for lab results @ 9:50.  He missed his 4 week apt in Jan.    No answer on both phones and no voice mail.

## 2023-03-02 NOTE — TELEPHONE ENCOUNTER
Spoke to Mr Xavier, he is back at work in [place]. Asking for results over the phone.  I don't see where a VL was done.

## 2023-03-02 NOTE — TELEPHONE ENCOUNTER
It looks like labs collected last month were the week 8 lab set that did not include VL.  He will need additional labs & I can do a virtual visit with him for results if he can arrange to be in the state of [place] for the visit.  Otherwise, we may have to wait until he returns home from this job & do labs at that point in time although not ideal.  Continue Epclusa daily as prescribed until complete. Thank you.

## 2023-03-06 ENCOUNTER — TELEPHONE (OUTPATIENT)
Dept: INFECTIOUS DISEASES | Facility: CLINIC | Age: 58
End: 2023-03-06
Payer: MEDICAID

## 2023-03-06 NOTE — TELEPHONE ENCOUNTER
Spoke with patient, which he is out of town for work again.  Pt to have 12wk labs done 1st week of April and will reschedule appt from 3/30/2023.

## 2023-03-06 NOTE — TELEPHONE ENCOUNTER
----- Message from Roseanna Sebastian sent at 3/6/2023  9:01 AM CST -----  Regarding: Results  MANNY MCCAIN       Pt is requesting a call back to discuss lab results.   Please call back @ [phone]

## 2023-03-20 ENCOUNTER — TELEPHONE (OUTPATIENT)
Dept: INFECTIOUS DISEASES | Facility: CLINIC | Age: 58
End: 2023-03-20
Payer: MEDICAID

## 2023-03-20 NOTE — TELEPHONE ENCOUNTER
----- Message from Iris Fabian sent at 3/20/2023  8:34 AM CDT -----  Regarding: Pt call  Lori Shaffer called stating that he took his last Hep C pill today. Pt ph:[phone]

## 2023-03-20 NOTE — TELEPHONE ENCOUNTER
Spoke to patient and he wants to draw his 12wk labs @ his 12wk visit with Lori, due to the way he works.  Informed patient that we can absolutely draw his labs at his 12wk appt.

## 2023-04-20 ENCOUNTER — TELEPHONE (OUTPATIENT)
Dept: INFECTIOUS DISEASES | Facility: CLINIC | Age: 58
End: 2023-04-20
Payer: MEDICAID

## 2023-04-20 NOTE — TELEPHONE ENCOUNTER
Spoke with patient to remind of 12 week hcv labwork that needed to be done and the appt that he missed on April 18.  Pt states he came to office on April 17, thinking he had appt on that day but it was next day.  Pt had to go back out of town for 6 weeks in [place], [place].  Pt is scheduled for SVR appt on 6/29/2023 and will need SVR lab on 6/21/2023, as explained to patient.  Pt is to call when he comes in last week of May, first week of June and let us know how long he will be in for, so we can try to work him in the schedule.  Explained to patient how important it will be for SVR appt and lab to make sure that the viral load is still Not Detected.  Voiced understanding.

## 2023-06-14 DIAGNOSIS — B18.2 CHRONIC HEPATITIS C WITHOUT HEPATIC COMA: Primary | ICD-10-CM

## 2023-08-21 DIAGNOSIS — Z12.11 COLON CANCER SCREENING: Primary | ICD-10-CM

## 2023-09-26 DIAGNOSIS — Z12.11 COLON CANCER SCREENING: Primary | ICD-10-CM

## 2023-10-02 ENCOUNTER — LAB VISIT (OUTPATIENT)
Dept: LAB | Facility: HOSPITAL | Age: 58
End: 2023-10-02
Attending: NURSE PRACTITIONER
Payer: MEDICAID

## 2023-10-02 DIAGNOSIS — Z00.00 WELLNESS EXAMINATION: ICD-10-CM

## 2023-10-02 DIAGNOSIS — Z12.5 PROSTATE CANCER SCREENING: ICD-10-CM

## 2023-10-02 LAB
ALBUMIN SERPL-MCNC: 3.5 G/DL (ref 3.5–5)
ALBUMIN/GLOB SERPL: 0.8 RATIO (ref 1.1–2)
ALP SERPL-CCNC: 82 UNIT/L (ref 40–150)
ALT SERPL-CCNC: 28 UNIT/L (ref 0–55)
APPEARANCE UR: CLEAR
AST SERPL-CCNC: 33 UNIT/L (ref 5–34)
BACTERIA #/AREA URNS AUTO: NORMAL /HPF
BASOPHILS # BLD AUTO: 0.02 X10(3)/MCL
BASOPHILS NFR BLD AUTO: 0.2 %
BILIRUB SERPL-MCNC: 0.7 MG/DL
BILIRUB UR QL STRIP.AUTO: NEGATIVE
BUN SERPL-MCNC: 11.6 MG/DL (ref 8.4–25.7)
CALCIUM SERPL-MCNC: 9.2 MG/DL (ref 8.4–10.2)
CHLORIDE SERPL-SCNC: 101 MMOL/L (ref 98–107)
CHOLEST SERPL-MCNC: 212 MG/DL
CHOLEST/HDLC SERPL: 2 {RATIO} (ref 0–5)
CO2 SERPL-SCNC: 31 MMOL/L (ref 22–29)
COLOR UR AUTO: YELLOW
CREAT SERPL-MCNC: 1.19 MG/DL (ref 0.73–1.18)
DEPRECATED CALCIDIOL+CALCIFEROL SERPL-MC: 43.4 NG/ML (ref 30–80)
EOSINOPHIL # BLD AUTO: 0.17 X10(3)/MCL (ref 0–0.9)
EOSINOPHIL NFR BLD AUTO: 1.7 %
ERYTHROCYTE [DISTWIDTH] IN BLOOD BY AUTOMATED COUNT: 19.6 % (ref 11.5–17)
EST. AVERAGE GLUCOSE BLD GHB EST-MCNC: 116.9 MG/DL
GFR SERPLBLD CREATININE-BSD FMLA CKD-EPI: >60 MLS/MIN/1.73/M2
GLOBULIN SER-MCNC: 4.2 GM/DL (ref 2.4–3.5)
GLUCOSE SERPL-MCNC: 122 MG/DL (ref 74–100)
GLUCOSE UR QL STRIP.AUTO: NEGATIVE
HBA1C MFR BLD: 5.7 %
HCT VFR BLD AUTO: 52 % (ref 42–52)
HDLC SERPL-MCNC: 85 MG/DL (ref 35–60)
HGB BLD-MCNC: 15.8 G/DL (ref 14–18)
IMM GRANULOCYTES # BLD AUTO: 0.01 X10(3)/MCL (ref 0–0.04)
IMM GRANULOCYTES NFR BLD AUTO: 0.1 %
KETONES UR QL STRIP.AUTO: NEGATIVE
LDLC SERPL CALC-MCNC: 106 MG/DL (ref 50–140)
LEUKOCYTE ESTERASE UR QL STRIP.AUTO: NEGATIVE
LYMPHOCYTES # BLD AUTO: 2.58 X10(3)/MCL (ref 0.6–4.6)
LYMPHOCYTES NFR BLD AUTO: 25.2 %
MCH RBC QN AUTO: 21.8 PG (ref 27–31)
MCHC RBC AUTO-ENTMCNC: 30.4 G/DL (ref 33–36)
MCV RBC AUTO: 71.7 FL (ref 80–94)
MONOCYTES # BLD AUTO: 0.6 X10(3)/MCL (ref 0.1–1.3)
MONOCYTES NFR BLD AUTO: 5.9 %
NEUTROPHILS # BLD AUTO: 6.86 X10(3)/MCL (ref 2.1–9.2)
NEUTROPHILS NFR BLD AUTO: 66.9 %
NITRITE UR QL STRIP.AUTO: NEGATIVE
PH UR STRIP.AUTO: 7.5 [PH]
PLATELET # BLD AUTO: 260 X10(3)/MCL (ref 130–400)
PMV BLD AUTO: 10 FL (ref 7.4–10.4)
POTASSIUM SERPL-SCNC: 4.4 MMOL/L (ref 3.5–5.1)
PROT SERPL-MCNC: 7.7 GM/DL (ref 6.4–8.3)
PROT UR QL STRIP.AUTO: 30
PSA SERPL-MCNC: 0.57 NG/ML
RBC # BLD AUTO: 7.25 X10(6)/MCL (ref 4.7–6.1)
RBC #/AREA URNS AUTO: NORMAL /HPF
RBC UR QL AUTO: ABNORMAL
SODIUM SERPL-SCNC: 143 MMOL/L (ref 136–145)
SP GR UR STRIP.AUTO: 1.01 (ref 1–1.03)
SQUAMOUS #/AREA URNS AUTO: NORMAL /HPF
T4 FREE SERPL-MCNC: 0.95 NG/DL (ref 0.7–1.48)
TRIGL SERPL-MCNC: 107 MG/DL (ref 34–140)
TSH SERPL-ACNC: 1.47 UIU/ML (ref 0.35–4.94)
UROBILINOGEN UR STRIP-ACNC: 1
VLDLC SERPL CALC-MCNC: 21 MG/DL
WBC # SPEC AUTO: 10.24 X10(3)/MCL (ref 4.5–11.5)
WBC #/AREA URNS AUTO: NORMAL /HPF

## 2023-10-02 PROCEDURE — 80061 LIPID PANEL: CPT

## 2023-10-02 PROCEDURE — 84153 ASSAY OF PSA TOTAL: CPT

## 2023-10-02 PROCEDURE — 85025 COMPLETE CBC W/AUTO DIFF WBC: CPT

## 2023-10-02 PROCEDURE — 82306 VITAMIN D 25 HYDROXY: CPT

## 2023-10-02 PROCEDURE — 80053 COMPREHEN METABOLIC PANEL: CPT

## 2023-10-02 PROCEDURE — 84443 ASSAY THYROID STIM HORMONE: CPT

## 2023-10-02 PROCEDURE — 83036 HEMOGLOBIN GLYCOSYLATED A1C: CPT

## 2023-10-02 PROCEDURE — 36415 COLL VENOUS BLD VENIPUNCTURE: CPT

## 2023-10-02 PROCEDURE — 84439 ASSAY OF FREE THYROXINE: CPT

## 2023-10-02 PROCEDURE — 81001 URINALYSIS AUTO W/SCOPE: CPT

## 2023-10-03 ENCOUNTER — OFFICE VISIT (OUTPATIENT)
Dept: INTERNAL MEDICINE | Facility: CLINIC | Age: 58
End: 2023-10-03
Payer: MEDICAID

## 2023-10-03 VITALS — DIASTOLIC BLOOD PRESSURE: 92 MMHG | SYSTOLIC BLOOD PRESSURE: 160 MMHG

## 2023-10-03 DIAGNOSIS — Z23 IMMUNIZATION DUE: ICD-10-CM

## 2023-10-03 DIAGNOSIS — I10 PRIMARY HYPERTENSION: ICD-10-CM

## 2023-10-03 DIAGNOSIS — F17.210 CIGARETTE NICOTINE DEPENDENCE WITHOUT COMPLICATION: ICD-10-CM

## 2023-10-03 DIAGNOSIS — Z00.00 WELLNESS EXAMINATION: Primary | ICD-10-CM

## 2023-10-03 PROCEDURE — 4010F PR ACE/ARB THEARPY RXD/TAKEN: ICD-10-PCS | Mod: CPTII,,, | Performed by: NURSE PRACTITIONER

## 2023-10-03 PROCEDURE — 99406 PR TOBACCO USE CESSATION INTERMEDIATE 3-10 MINUTES: ICD-10-PCS | Mod: S$PBB,,, | Performed by: NURSE PRACTITIONER

## 2023-10-03 PROCEDURE — 1159F MED LIST DOCD IN RCRD: CPT | Mod: CPTII,,, | Performed by: NURSE PRACTITIONER

## 2023-10-03 PROCEDURE — 90686 IIV4 VACC NO PRSV 0.5 ML IM: CPT | Mod: PBBFAC

## 2023-10-03 PROCEDURE — 3044F PR MOST RECENT HEMOGLOBIN A1C LEVEL <7.0%: ICD-10-PCS | Mod: CPTII,,, | Performed by: NURSE PRACTITIONER

## 2023-10-03 PROCEDURE — 4010F ACE/ARB THERAPY RXD/TAKEN: CPT | Mod: CPTII,,, | Performed by: NURSE PRACTITIONER

## 2023-10-03 PROCEDURE — 1159F PR MEDICATION LIST DOCUMENTED IN MEDICAL RECORD: ICD-10-PCS | Mod: CPTII,,, | Performed by: NURSE PRACTITIONER

## 2023-10-03 PROCEDURE — 3077F PR MOST RECENT SYSTOLIC BLOOD PRESSURE >= 140 MM HG: ICD-10-PCS | Mod: CPTII,,, | Performed by: NURSE PRACTITIONER

## 2023-10-03 PROCEDURE — 99406 BEHAV CHNG SMOKING 3-10 MIN: CPT | Mod: S$PBB,,, | Performed by: NURSE PRACTITIONER

## 2023-10-03 PROCEDURE — 3044F HG A1C LEVEL LT 7.0%: CPT | Mod: CPTII,,, | Performed by: NURSE PRACTITIONER

## 2023-10-03 PROCEDURE — 90471 IMMUNIZATION ADMIN: CPT | Mod: PBBFAC

## 2023-10-03 PROCEDURE — 99213 OFFICE O/P EST LOW 20 MIN: CPT | Mod: PBBFAC | Performed by: NURSE PRACTITIONER

## 2023-10-03 PROCEDURE — 99396 PR PREVENTIVE VISIT,EST,40-64: ICD-10-PCS | Mod: S$PBB,,, | Performed by: NURSE PRACTITIONER

## 2023-10-03 PROCEDURE — 1160F PR REVIEW ALL MEDS BY PRESCRIBER/CLIN PHARMACIST DOCUMENTED: ICD-10-PCS | Mod: CPTII,,, | Performed by: NURSE PRACTITIONER

## 2023-10-03 PROCEDURE — 3080F PR MOST RECENT DIASTOLIC BLOOD PRESSURE >= 90 MM HG: ICD-10-PCS | Mod: CPTII,,, | Performed by: NURSE PRACTITIONER

## 2023-10-03 PROCEDURE — 1160F RVW MEDS BY RX/DR IN RCRD: CPT | Mod: CPTII,,, | Performed by: NURSE PRACTITIONER

## 2023-10-03 PROCEDURE — 99396 PREV VISIT EST AGE 40-64: CPT | Mod: S$PBB,,, | Performed by: NURSE PRACTITIONER

## 2023-10-03 PROCEDURE — 3080F DIAST BP >= 90 MM HG: CPT | Mod: CPTII,,, | Performed by: NURSE PRACTITIONER

## 2023-10-03 PROCEDURE — 3077F SYST BP >= 140 MM HG: CPT | Mod: CPTII,,, | Performed by: NURSE PRACTITIONER

## 2023-10-03 RX ORDER — HYDROCODONE BITARTRATE AND ACETAMINOPHEN 5; 325 MG/1; MG/1
1 TABLET ORAL
COMMUNITY
Start: 2023-06-14

## 2023-10-03 RX ORDER — AMOXICILLIN 500 MG/1
500 CAPSULE ORAL 4 TIMES DAILY
COMMUNITY
Start: 2023-06-14 | End: 2023-10-03 | Stop reason: ALTCHOICE

## 2023-10-03 RX ORDER — LISINOPRIL 40 MG/1
40 TABLET ORAL DAILY
Qty: 90 TABLET | Refills: 0 | Status: SHIPPED | OUTPATIENT
Start: 2023-10-03 | End: 2023-12-05 | Stop reason: SDUPTHER

## 2023-10-03 RX ORDER — AMLODIPINE BESYLATE 10 MG/1
10 TABLET ORAL DAILY
Qty: 90 TABLET | Refills: 0 | Status: SHIPPED | OUTPATIENT
Start: 2023-10-03 | End: 2023-12-05 | Stop reason: SDUPTHER

## 2023-10-03 RX ADMIN — INFLUENZA VIRUS VACCINE 0.5 ML: 15; 15; 15; 15 SUSPENSION INTRAMUSCULAR at 09:10

## 2023-10-03 NOTE — PROGRESS NOTES
Ginny Chery, GENTRY   [facility]  [address] [facility] [address]      PATIENT NAME: Xavier Harmon  : 1965  DATE: 10/3/23  MRN: [id number]      Reason for Visit / Chief Complaint: Health Maintenance (Lab review )       History of Present Illness / Problem Focused Workflow     Xavier Harmon presents to the clinic with Health Maintenance (Lab review )     58 yo male here today for f/u. PMH HTN, HCV, and Tobacco use (1/2 ppd), 30 ppy hx).     Prostate Cancer Screening - 10/05/23 PSA 0.57  Colon Cancer Screening -  Osteoporosis Screening - 10/05/23 Vitamin D level 43.4  STI Screening - [facility]   Lung Cancer Screening - 10/03/2023 Ordered  Wellness - 10/03/2023    10/03/2023  Pt here today for routine wellness f/u with labs completed; reviewed and discussed with no questions or concerns. Meds reviewed and refilled appropriately. BP elevated today, pt reports he has been out of his meds, no med refill request / pt calls noted on chart, will refill meds today. Pt reports that it is difficult for him to make it here for both Primary Care Services and also for ID clinic. Pt lives in [facility], will send referral today to [facility] for PCP services, clinic number given to pt today as well. Will drop off FIT test when he comes. Denies any acute issues today.   Denies chest pain, shortness of breath, cough, fever, headache, dizziness, weakness, abdominal pain, nausea, vomiting, diarrhea, constipation, black/bloody stools, unplanned weight loss, night sweats, changes in urinary patterns, burning/odor with urination, depression, anxiety, and SI/HI.             Review of Systems     Review of Systems   Constitutional: Negative.    HENT: Negative.     Eyes: Negative.    Respiratory: Negative.     Cardiovascular: Negative.    Gastrointestinal: Negative.    Endocrine: Negative.    Genitourinary: Negative.    Neurological: Negative.     Psychiatric/Behavioral: Negative.           Medications and Allergies     Medications  Medication List with Changes/Refills   Current Medications    DICLOFENAC (VOLTAREN) 75 MG EC TABLET    Take 1 tablet (75 mg total) by mouth 2 (two) times daily as needed (pain).    DICLOFENAC SODIUM (VOLTAREN) 1 % GEL    Apply 2 g topically 4 (four) times daily. To right knee.    HYDROCODONE-ACETAMINOPHEN (NORCO) 5-325 MG PER TABLET    Take 1 tablet by mouth.    SOFOSBUVIR-VELPATASVIR 400-100 MG TAB    Take 1 tablet by mouth once daily.   Changed and/or Refilled Medications    Modified Medication Previous Medication    AMLODIPINE (NORVASC) 10 MG TABLET amLODIPine (NORVASC) 10 MG tablet       Take 1 tablet (10 mg total) by mouth once daily. For High Blood Pressure    Take 1 tablet (10 mg total) by mouth once daily. For High Blood Pressure    LISINOPRIL (PRINIVIL,ZESTRIL) 40 MG TABLET lisinopriL (PRINIVIL,ZESTRIL) 40 MG tablet       Take 1 tablet (40 mg total) by mouth once daily. For High Blood Pressure    Take 1 tablet (40 mg total) by mouth once daily. For High Blood Pressure   Discontinued Medications    AMOXICILLIN (AMOXIL) 500 MG CAPSULE    Take 500 mg by mouth 4 (four) times daily.         Allergies  Review of patient's allergies indicates:  No Known Allergies    Physical Examination     Vitals:    10/03/23 0903   BP: (!) 160/92     Physical Exam  Vitals reviewed.   Constitutional:       Appearance: Normal appearance. He is normal weight.   HENT:      Head: Normocephalic.   Cardiovascular:      Rate and Rhythm: Normal rate and regular rhythm.      Pulses: Normal pulses.      Heart sounds: Normal heart sounds.   Pulmonary:      Effort: Pulmonary effort is normal.      Breath sounds: Normal breath sounds.   Abdominal:      General: Abdomen is flat.      Palpations: Abdomen is soft.   Musculoskeletal:         General: Normal range of motion.      Cervical back: Normal range of motion.   Skin:     General: Skin is warm and dry.  "  Neurological:      Mental Status: He is alert.   Psychiatric:         Mood and Affect: Mood normal.           Results     Lab Results   Component Value Date    WBC 10.24 10/02/2023    RBC 7.25 (H) 10/02/2023    HGB 15.8 10/02/2023    HCT 52.0 10/02/2023    MCV 71.7 (L) 10/02/2023    MCH 21.8 (L) 10/02/2023    MCHC 30.4 (L) 10/02/2023    RDW 19.6 (H) 10/02/2023     10/02/2023    MPV 10.0 10/02/2023     Sodium Level   Date Value Ref Range Status   10/02/2023 143 136 - 145 mmol/L Final     Potassium Level   Date Value Ref Range Status   10/02/2023 4.4 3.5 - 5.1 mmol/L Final     Carbon Dioxide   Date Value Ref Range Status   10/02/2023 31 (H) 22 - 29 mmol/L Final     Blood Urea Nitrogen   Date Value Ref Range Status   10/02/2023 11.6 8.4 - 25.7 mg/dL Final     Creatinine   Date Value Ref Range Status   10/02/2023 1.19 (H) 0.73 - 1.18 mg/dL Final     Calcium Level Total   Date Value Ref Range Status   10/02/2023 9.2 8.4 - 10.2 mg/dL Final     Albumin Level   Date Value Ref Range Status   10/02/2023 3.5 3.5 - 5.0 g/dL Final     Bilirubin Total   Date Value Ref Range Status   10/02/2023 0.7 <=1.5 mg/dL Final     Alkaline Phosphatase   Date Value Ref Range Status   10/02/2023 82 40 - 150 unit/L Final     Aspartate Aminotransferase   Date Value Ref Range Status   10/02/2023 33 5 - 34 unit/L Final     Alanine Aminotransferase   Date Value Ref Range Status   10/02/2023 28 0 - 55 unit/L Final     Estimated GFR-Non    Date Value Ref Range Status   07/08/2018 >60 mL/min/1.73 m2 Final     Lab Results   Component Value Date    CHOL 212 (H) 10/02/2023     Lab Results   Component Value Date    HDL 85 (H) 10/02/2023     No results found for: "LDLCALC"  Lab Results   Component Value Date    TRIG 107 10/02/2023     No results found for: "CHOLHDL"  Lab Results   Component Value Date    TSH 1.472 10/02/2023     Lab Results   Component Value Date    PHUR 7.0 01/22/2021    PROTEINUA 30 (A) 10/02/2023    GLUCUA " Negative 01/22/2021    KETONESU Trace (A) 01/22/2021    OCCULTUA Negative 01/22/2021    NITRITE Negative 01/22/2021    LEUKOCYTESUR Negative 10/02/2023     Lab Results   Component Value Date    HGBA1C 5.7 10/02/2023    HGBA1C 6.1 08/16/2022           Assessment         ICD-10-CM ICD-9-CM   1. Wellness examination  Z00.00 V70.0   2. Cigarette nicotine dependence without complication  F17.210 305.1   3. Primary hypertension  I10 401.9   4. Immunization due  Z23 V05.9       Plan      Problem List Items Addressed This Visit          Cardiac/Vascular    Primary hypertension (Chronic)    Overview     Low Sodium Diet (Dash Diet - less than 2 grams of sodium per day).  Maintain healthy weight with goal BMI <30. Exercise 30 minutes per day 5 days per week.           Current Assessment & Plan     /92  Continue RX lisinopril 40 mg and amlodipine 10 mg daily   1 week BP check          Relevant Medications    amLODIPine (NORVASC) 10 MG tablet    lisinopriL (PRINIVIL,ZESTRIL) 40 MG tablet    Other Relevant Orders    Ambulatory referral/consult to Family Practice       Other    Cigarette nicotine dependence without complication (Chronic)    Relevant Orders    CT Chest Lung Screening Low Dose     Other Visit Diagnoses       Wellness examination    -  Primary    Immunization due        Relevant Medications    influenza (QUADRIVALENT PF) vaccine 0.5 mL (Completed) (Start on 10/3/2023 10:05 AM)            No future appointments.         Signature:     [facility]  [address] [address]    Date of encounter: 10/3/23

## 2023-10-10 ENCOUNTER — LAB VISIT (OUTPATIENT)
Dept: LAB | Facility: HOSPITAL | Age: 58
End: 2023-10-10
Attending: NURSE PRACTITIONER
Payer: MEDICAID

## 2023-10-10 DIAGNOSIS — Z12.11 SCREENING FOR MALIGNANT NEOPLASM OF COLON: Primary | ICD-10-CM

## 2023-10-10 DIAGNOSIS — B18.2 CHRONIC HEPATITIS C WITHOUT HEPATIC COMA: ICD-10-CM

## 2023-10-10 LAB
OB IA INT NEG CNTRL (OHS): NEGATIVE
OB IA INT POS CNTRL (OHS): POSITIVE
OCCULT BLD IA (OHS): NEGATIVE

## 2023-10-10 PROCEDURE — 82274 ASSAY TEST FOR BLOOD FECAL: CPT

## 2023-11-10 ENCOUNTER — TELEPHONE (OUTPATIENT)
Dept: INFECTIOUS DISEASES | Facility: CLINIC | Age: 58
End: 2023-11-10
Payer: MEDICAID

## 2023-11-10 NOTE — TELEPHONE ENCOUNTER
Spoke with patient. Questioned if labs and appt with ID provider can be transferred to [facility]. Verified that labs can be done at [facility] but provider appt would be in [place]. Patient has appt 01/02/2024. Voiced understanding. To contact clinic prn.

## 2023-12-05 DIAGNOSIS — I10 PRIMARY HYPERTENSION: ICD-10-CM

## 2023-12-05 RX ORDER — LISINOPRIL 40 MG/1
40 TABLET ORAL DAILY
Qty: 90 TABLET | Refills: 0 | Status: SHIPPED | OUTPATIENT
Start: 2023-12-05 | End: 2024-04-01 | Stop reason: SDUPTHER

## 2023-12-05 RX ORDER — AMLODIPINE BESYLATE 10 MG/1
10 TABLET ORAL DAILY
Qty: 90 TABLET | Refills: 0 | Status: SHIPPED | OUTPATIENT
Start: 2023-12-05 | End: 2024-04-01 | Stop reason: SDUPTHER

## 2023-12-05 RX ORDER — LISINOPRIL 40 MG/1
40 TABLET ORAL DAILY
Qty: 90 TABLET | Refills: 0 | OUTPATIENT
Start: 2023-12-05 | End: 2024-03-04

## 2023-12-05 RX ORDER — AMLODIPINE BESYLATE 10 MG/1
10 TABLET ORAL DAILY
Qty: 90 TABLET | Refills: 0 | OUTPATIENT
Start: 2023-12-05 | End: 2024-03-04

## 2023-12-05 NOTE — TELEPHONE ENCOUNTER
Pt notified rx was sent in and offered appt for January, pt states they have changed his work schedule and he wont know his set work schedule for January until next week and will call back to schedule.

## 2023-12-05 NOTE — TELEPHONE ENCOUNTER
Pt states he has never seen Asia Hamptonnasreen and does not want to see her. He states he would like to stay under your care. He said the reason he thought about transferring was that [facility] was closer but he is scared to go without his meds for that long.

## 2023-12-05 NOTE — TELEPHONE ENCOUNTER
Pt called stating at his last appt, we sent in a 90 day supply of his Amlodipine and Lisinopril to [facility] but they only filled 30 days and he is out of meds and leaves tomorrow to go out of town for work for 6 weeks. I called [facility] to see why they are only filling his rx as a 30 day supply. Pharmacist states his insurance only covers 30 day supplys and will need us to send in a new rx for them as he is out of refills on both and if pt wants the 90 day supply he will have to pay out of pocket for them. Pt wants us to send the 90 day one more time so he can see if he can pay out of pocket so I dropped in the refills. When you send them will you please let me know so I can notify the pt since he is leaving tomorrow for work? LOV 10/3/23 and doesn't have appt scheduled for follow up. He said he will schedule when I call him back because he didn't have his schedule in front of him.

## 2024-01-30 ENCOUNTER — OFFICE VISIT (OUTPATIENT)
Dept: INFECTIOUS DISEASES | Facility: CLINIC | Age: 59
End: 2024-01-30
Payer: MEDICAID

## 2024-01-30 VITALS
SYSTOLIC BLOOD PRESSURE: 145 MMHG | RESPIRATION RATE: 16 BRPM | HEIGHT: 70 IN | WEIGHT: 201.81 LBS | DIASTOLIC BLOOD PRESSURE: 80 MMHG | TEMPERATURE: 98 F | HEART RATE: 94 BPM | BODY MASS INDEX: 28.89 KG/M2

## 2024-01-30 DIAGNOSIS — B18.2 CHRONIC HEPATITIS C WITHOUT HEPATIC COMA: Primary | ICD-10-CM

## 2024-01-30 DIAGNOSIS — M25.561 CHRONIC PAIN OF RIGHT KNEE: ICD-10-CM

## 2024-01-30 DIAGNOSIS — A63.0 CONDYLOMA ACUMINATUM OF PENIS: ICD-10-CM

## 2024-01-30 DIAGNOSIS — G89.29 CHRONIC PAIN OF RIGHT KNEE: ICD-10-CM

## 2024-01-30 DIAGNOSIS — Z23 NEED FOR VACCINATION: ICD-10-CM

## 2024-01-30 PROCEDURE — 99213 OFFICE O/P EST LOW 20 MIN: CPT | Mod: PBBFAC | Performed by: NURSE PRACTITIONER

## 2024-01-30 PROCEDURE — 36415 COLL VENOUS BLD VENIPUNCTURE: CPT | Performed by: NURSE PRACTITIONER

## 2024-01-30 PROCEDURE — 90636 HEP A/HEP B VACC ADULT IM: CPT | Mod: PBBFAC

## 2024-01-30 PROCEDURE — 87522 HEPATITIS C REVRS TRNSCRPJ: CPT | Performed by: NURSE PRACTITIONER

## 2024-01-30 PROCEDURE — 3079F DIAST BP 80-89 MM HG: CPT | Mod: CPTII,,, | Performed by: NURSE PRACTITIONER

## 2024-01-30 PROCEDURE — 3008F BODY MASS INDEX DOCD: CPT | Mod: CPTII,,, | Performed by: NURSE PRACTITIONER

## 2024-01-30 PROCEDURE — 1160F RVW MEDS BY RX/DR IN RCRD: CPT | Mod: CPTII,,, | Performed by: NURSE PRACTITIONER

## 2024-01-30 PROCEDURE — 99214 OFFICE O/P EST MOD 30 MIN: CPT | Mod: S$PBB,,, | Performed by: NURSE PRACTITIONER

## 2024-01-30 PROCEDURE — 90471 IMMUNIZATION ADMIN: CPT | Mod: PBBFAC

## 2024-01-30 PROCEDURE — 1159F MED LIST DOCD IN RCRD: CPT | Mod: CPTII,,, | Performed by: NURSE PRACTITIONER

## 2024-01-30 PROCEDURE — 3077F SYST BP >= 140 MM HG: CPT | Mod: CPTII,,, | Performed by: NURSE PRACTITIONER

## 2024-01-30 RX ORDER — IMIQUIMOD 12.5 MG/.25G
CREAM TOPICAL
Qty: 16 PACKET | Refills: 2 | Status: SHIPPED | OUTPATIENT
Start: 2024-01-31 | End: 2024-03-19 | Stop reason: SDUPTHER

## 2024-01-30 RX ORDER — DICLOFENAC SODIUM 75 MG/1
75 TABLET, DELAYED RELEASE ORAL 2 TIMES DAILY PRN
Qty: 20 TABLET | Refills: 1 | Status: SHIPPED | OUTPATIENT
Start: 2024-01-30 | End: 2024-05-20

## 2024-01-30 RX ADMIN — HEPATITIS A AND HEPATITIS B (RECOMBINANT) VACCINE 720 UNITS: 720; 20 INJECTION, SUSPENSION INTRAMUSCULAR at 08:01

## 2024-01-30 NOTE — PROGRESS NOTES
"Patient ID: Xavier Harmon 58 y.o.     Chief Complaint:   Chief Complaint   Patient presents with    Followup Hep C     Questions "Warts" penile area        HPI:    1/30/24  Xavier is a 57 yo AAM here today for HCV f/u visit. He completed 12 weeks of Epclusa around 3/21/23 as prescribed. He has had difficulty with attending visits for f/u due to [place]. Will collect labs today to assess for SVR status. He has no identifiable risk factors for HCV re-exposures.  He denies jaundice, icterus, nausea, vomiting, dark urine, or adilia colored stools.  BP elevated today, drank an energy drink this am. He does wish to restart Hep A/B vaccination series, will schedule nurse visits for 2nd & 3rd doses. He tells me that he does have 2 warts to penile shaft that are bothersome. He has never received treatment for same. Appreciates topical home treatment option, will send prescription for same. Voiced appreciation. He also states that he has chronic right knee pain, has previously used diclofenac tablets for same with good response. Will send a few tablets to bridge until he can f/u with PCP for same. Voiced appreciation. All questions answered & concerns addressed.    12/12/22  Xavier is a 57 AAM presenting today for HCV f/u visit & lab results.  He is eager to start treatment asap and tells me that he interviewed for a job today.  He will have pre-employment physical tomorrow & hopes to start job next week.  He will be working in [place], 1st hitch will be about 6 weeks.  He agrees to take Epclusa daily as prescribed and will either arrange for someone to mail it to him, or delay start by a couple of weeks so that he is back for 4 week refill  & avoid treatment interruption.  He appreciates starting Twinrix vaccination series & 2nd dose of Shingrix today.  All questions answered & concerns addressed.      11/22/22  Xavier is a 56 yo AAM presenting today for HCV initial evaluation.  He was " "diagnosed 8/22 and referred by PCP DANIE Chery NP.  He is treatment naive.  RUQ abdominal u/s completed 10/19/22, significant for steatosis.  He had FibroScan completed today, pending results.  He was unable to come in for labs, will collect today.  He denies any history of blood transfusion, tattoos, IVDU, or known HCV + sexual partners.  He was a plasma donor in the 1980s.  Lab confirmed HCV infection with 4.8 mil copies of virus 8/18/22.  Will get additional labs & he agrees to return next week for results & treatment plan.             Past Medical History:   Diagnosis Date    Chronic pain of right knee 8/18/2022    Cigarette nicotine dependence without complication 6/6/2022    Elevated LFTs 8/26/2022    Hepatitis C antibody test positive 8/18/2022    HTN (hypertension)     Illicit drug use 6/6/2022    Lymphadenopathy 8/18/2022    Mass of skin of back 8/18/2022    Obesity, unspecified     Open wound of midline of back 11/4/2022    Prediabetes 6/6/2022    Primary hypertension 6/6/2022    Primary osteoarthritis of right knee 8/26/2022    Tobacco abuse     Vitamin D deficiency 8/18/2022        Past Surgical History:   Procedure Laterality Date    EXCISION OF MASS OF BACK N/A 10/3/2022    Procedure: EXCISION, MASS, BACK;  Surgeon: Nick Grewal MD;  Location: [facility];  Service: General;  Laterality: N/A;  Needs prone positioning.        Social History     Socioeconomic History    Marital status: Single   Tobacco Use    Smoking status: Every Day     Current packs/day: 0.50     Average packs/day: 0.5 packs/day for 48.6 years (24.3 ttl pk-yrs)     Types: Cigarettes     Start date: 6/21/1975    Smokeless tobacco: Never   Substance and Sexual Activity    Alcohol use: Yes     Comment: occassioanlly    Drug use: Not Currently     Types: "Crack" cocaine     Comment: States been a little while    Sexual activity: Yes     Partners: Female     Birth control/protection: Condom     Social Determinants of Health     Financial " Resource Strain: Low Risk  (10/3/2023)    Overall Financial Resource Strain (CARDIA)     Difficulty of Paying Living Expenses: Not hard at all   Food Insecurity: No Food Insecurity (10/3/2023)    Hunger Vital Sign     Worried About Running Out of Food in the Last Year: Never true     Ran Out of Food in the Last Year: Never true   Transportation Needs: No Transportation Needs (10/3/2023)    PRAPARE - Transportation     Lack of Transportation (Medical): No     Lack of Transportation (Non-Medical): No   Physical Activity: Insufficiently Active (10/3/2023)    Exercise Vital Sign     Days of Exercise per Week: 2 days     Minutes of Exercise per Session: 30 min   Stress: No Stress Concern Present (10/3/2023)    [employer] [employer] - Occupational Stress Questionnaire     Feeling of Stress : Not at all   Social Connections: Moderately Isolated (10/3/2023)    Social Connection and Isolation Panel [NHANES]     Frequency of Communication with Friends and Family: More than three times a week     Frequency of Social Gatherings with Friends and Family: More than three times a week     Attends [employer] Services: 1 to 4 times per year     Active Member of Clubs or Organizations: No     Attends Club or Organization Meetings: Never     Marital Status: Never    Housing Stability: Low Risk  (10/3/2023)    Housing Stability Vital Sign     Unable to Pay for Housing in the Last Year: No     Number of Places Lived in the Last Year: 1     Unstable Housing in the Last Year: No        Family History   Problem Relation Age of Onset    Hypertension Mother     Diabetes Mother     Stroke Father     No Known Problems Daughter         Review of patient's allergies indicates:  No Known Allergies     Immunization History   Administered Date(s) Administered    COVID-19, MRNA, LN-S, PF (MODERNA FULL 0.5 ML DOSE) 08/22/2021    Hepatitis A / Hepatitis B 12/12/2022    Influenza - Quadrivalent - PF *Preferred* (6 months and  "older) 10/27/2022, 10/03/2023    Tdap 08/18/2022    Zoster Recombinant 08/18/2022, 12/12/2022        Review of Systems   Constitutional: Negative.    HENT: Negative.     Eyes: Negative.    Respiratory: Negative.     Cardiovascular: Negative.    Gastrointestinal: Negative.    Genitourinary: Negative.         Penile lesion   Musculoskeletal:  Positive for joint pain.   Skin: Negative.    Neurological: Negative.    Endo/Heme/Allergies: Negative.    Psychiatric/Behavioral: Negative.     All other systems reviewed and are negative.         Objective:      BP (!) 169/78 (BP Location: Right arm, Patient Position: Sitting, BP Method: Large (Automatic))   Pulse 94   Temp 98.4 °F (36.9 °C) (Oral)   Resp 16   Ht 5' 10" (1.778 m)   Wt 91.5 kg (201 lb 12.8 oz)   BMI 28.96 kg/m²      Physical Exam  Vitals reviewed.   Constitutional:       General: He is not in acute distress.     Appearance: Normal appearance. He is not toxic-appearing.   Eyes:      General: No scleral icterus.  Cardiovascular:      Rate and Rhythm: Normal rate and regular rhythm.      Heart sounds: Normal heart sounds.   Pulmonary:      Effort: Pulmonary effort is normal. No respiratory distress.      Breath sounds: Normal breath sounds.   Abdominal:      General: Bowel sounds are normal. There is no distension.      Palpations: Abdomen is soft. There is no mass.      Tenderness: There is no abdominal tenderness.   Genitourinary:     Penis: Lesions present.        Musculoskeletal:         General: Normal range of motion.   Skin:     General: Skin is warm and dry.   Neurological:      Mental Status: He is alert and oriented to person, place, and time.          Labs:   Lab Results   Component Value Date    WBC 10.24 10/02/2023    HGB 15.8 10/02/2023    HCT 52.0 10/02/2023    MCV 71.7 (L) 10/02/2023     10/02/2023       CMP  Sodium Level   Date Value Ref Range Status   10/02/2023 143 136 - 145 mmol/L Final     Potassium Level   Date Value Ref Range " Status   10/02/2023 4.4 3.5 - 5.1 mmol/L Final     Carbon Dioxide   Date Value Ref Range Status   10/02/2023 31 (H) 22 - 29 mmol/L Final     Blood Urea Nitrogen   Date Value Ref Range Status   10/02/2023 11.6 8.4 - 25.7 mg/dL Final     Creatinine   Date Value Ref Range Status   10/02/2023 1.19 (H) 0.73 - 1.18 mg/dL Final     Calcium Level Total   Date Value Ref Range Status   10/02/2023 9.2 8.4 - 10.2 mg/dL Final     Albumin Level   Date Value Ref Range Status   10/02/2023 3.5 3.5 - 5.0 g/dL Final     Bilirubin Total   Date Value Ref Range Status   10/02/2023 0.7 <=1.5 mg/dL Final     Alkaline Phosphatase   Date Value Ref Range Status   10/02/2023 82 40 - 150 unit/L Final     Aspartate Aminotransferase   Date Value Ref Range Status   10/02/2023 33 5 - 34 unit/L Final     Alanine Aminotransferase   Date Value Ref Range Status   10/02/2023 28 0 - 55 unit/L Final     eGFR   Date Value Ref Range Status   10/02/2023 >60 mls/min/1.73/m2 Final     Lab Results   Component Value Date    TSH 1.472 10/02/2023     HCV Genotype   Date Value Ref Range Status   11/22/2022 1 (A) Undetected Final     Comment:     A definitive genotype (i.e., 1 or 6) or subtype (i.e., 1a   or 1b) could not be assigned. HCV genotype resolution   testing is ordered.     -------------------ADDITIONAL INFORMATION-------------------  This test was performed using the [employer] RealTime HCV   Genotype II assay ([employer] [place], [place]).     Test Performed by:  [id number] [place] [address]  : Narendra Kirk M.D. Ph.D.; CLIA# [id number]     HCV RNA Detect/Quant   Date Value Ref Range Status   11/22/2022 5692378 (A) Undetected IU/mL Final     Comment:     Result in log IU/mL is 6.50.     -------------------ADDITIONAL INFORMATION-------------------  The quantification range of this assay is 15 to 100,000,000   IU/mL (1.18 log to 8.00 log IU/mL). Testing was performed    using the robert HCV test ([employer]   with the robert 6800 System.     Test Performed by:  [facility]  [address] [place] [address]  : Narendra Kirk M.D. Ph.D.; CLIA# [id number]     HIV   Date Value Ref Range Status   11/22/2022 Nonreactive Nonreactive Final     Syphilis Antibody   Date Value Ref Range Status   11/22/2022 Nonreactive Nonreactive, Equivocal Final     Hepatitis B Surface Antigen   Date Value Ref Range Status   11/22/2022 Nonreactive Nonreactive Final     Hepatitis B Surface Antibody   Date Value Ref Range Status   11/22/2022 Nonreactive Nonreactive Final     Hepatitis B Surface Antibody Qnt   Date Value Ref Range Status   11/22/2022 0.26 mIU/mL Final     Comment:     Interpretive Data Hep Bs Ab#  Result (mIU/mL)Interpretation - Hep B Surface Antibody  <8.00Nonreactive: Patient considered not immune to HBV infection  >=8.00 to <12.00Grayzone: Unable to determine immune status. Follow-up testing should be performed  >=12.00Reactive: Patient considered immune to HBV infection       Hepatitis B Core Antibody   Date Value Ref Range Status   11/22/2022 Nonreactive Nonreactive Final     Hep A IGG   Date Value Ref Range Status   11/22/2022 Nonreactive Nonreactive Final     FibroTest Stage   Date Value Ref Range Status   11/22/2022 F1-F2  Final     ActiTest Grade   Date Value Ref Range Status   11/22/2022 A3  Final     Alpha-2-Macroglobulin   Date Value Ref Range Status   11/22/2022 213 100 - 280 mg/dL Final     Haptoglobin   Date Value Ref Range Status   11/22/2022 160 30 - 200 mg/dL Final     Alanine Aminotransferase (ALT)   Date Value Ref Range Status   11/22/2022 111 (H) 7 - 55 U/L Final     Gamma Glutamyltransferase (GGT)   Date Value Ref Range Status   11/22/2022 282 (H) 8 - 61 U/L Final     Bilirubin, Total   Date Value Ref Range Status   11/22/2022 0.5 <=1.2 mg/dL Final     Comment:        Test Performed  by:  [facility]  [address] [place] [address]  : Narendra Kirk M.D. Ph.D.; CLIA# [id number]     Test Performed by:  [facility]  [address] [place] [address]  : Narendra Kirk M.D. Ph.D.; CLIA# [id number]     Ferritin Level   Date Value Ref Range Status   11/22/2022 457.72 (H) 21.81 - 274.66 ng/mL Final       Imaging: Reviewed most recent relevant imaging studies available, notable results highlighted in this note      Medications:     Current Outpatient Medications   Medication Instructions    amLODIPine (NORVASC) 10 mg, Oral, Daily, For High Blood Pressure    diclofenac (VOLTAREN) 75 mg, Oral, 2 times daily PRN    diclofenac sodium (VOLTAREN) 2 g, Topical (Top), 4 times daily, To right knee.    HYDROcodone-acetaminophen (NORCO) 5-325 mg per tablet 1 tablet, Oral    [START ON 1/31/2024] imiquimod (ALDARA) 5 % cream Topical (Top), Three times weekly    lisinopriL (PRINIVIL,ZESTRIL) 40 mg, Oral, Daily, For High Blood Pressure       Assessment:       Problem List Items Addressed This Visit          GI    Hepatitis C - Primary    Relevant Orders    Hepatitis C Virus Quantitative       Orthopedic    Chronic pain of right knee    Relevant Medications    diclofenac (VOLTAREN) 75 MG EC tablet     Other Visit Diagnoses       Condyloma acuminatum of penis        Relevant Medications    imiquimod (ALDARA) 5 % cream (Start on 1/31/2024)    Need for vaccination        Relevant Medications    hepatitis A and B vaccine (PF) 720 MARY ANN unit- 20 mcg/mL suspension 720 Units               Plan:      Chronic hepatitis C without hepatic coma  -     Hepatitis C Virus Quantitative; Future; Expected date: 01/30/2024  History: Diagnosed 8/22  Treatment naive.  GT 1a, baseline VL 3.19 mil copies.  Fibrosure: A3, F1-2.  FibroScan 11/22/22: S2-3, F3.   RUQ abdominal u/s: 10/22 steatosis.  Blood precautions: do  not share a razor, needle, toothbrush, clippers with anyone.  Completed Epclusa 1 po daily x 12 weeks, 12/27/22-3/21/23.   Was lost to f/u, no labs during or after treatment.   HCV RNA today to assess for SVR 12.   Will TEXT patient with lab results as requested.  Presumptive post-treatment counseling provided as follows:    Congratulations on your Hepatitis C cure, to minimize risk of re-infection or progression of liver damage please consider the following recommendations.   Do not share a razor, toothbrush, needle, clippers with anyone.   Avoid all illicit drugs.  Minimize alcohol intake.   Hep C antibody will be positive for life, but does not provide immunity.  If you need repeat testing for Hepatitis C reinfection, will require Hepatitis C Viral Load (RNA) test.  Do not donate blood.  RTC PRN.   Follow-up with PCP for routine medical needs.    Condyloma acuminatum of penis  -     imiquimod (ALDARA) 5 % cream; Apply topically 3 (three) times a week.  Dispense: 16 packet; Refill: 2  Aldara cream 5% to lesions 3 times per week. Apply at bedtime & wash off in AM. Use for up to 16 weeks. Notify PCP if cream not effective to remove lesions.     Need for vaccination  -     hepatitis A and B vaccine (PF) 720 MARY ANN unit- 20 mcg/mL suspension 720 Units  Twinrix #1 today.   Please set up pt for nurse visits for doses #2 & #3.     Chronic pain of right knee  -     diclofenac (VOLTAREN) 75 MG EC tablet; Take 1 tablet (75 mg total) by mouth 2 (two) times daily as needed (pain).  Dispense: 20 tablet; Refill: 1  Diclofenac 75 mg po bid PRN knee pain.   F/u with PCP for further evaluation & treatment.   Drink plenty of water.

## 2024-01-31 LAB — HCV RNA SERPL NAA+PROBE-ACNC: NORMAL IU/ML

## 2024-02-01 ENCOUNTER — TELEPHONE (OUTPATIENT)
Dept: INFECTIOUS DISEASES | Facility: CLINIC | Age: 59
End: 2024-02-01
Payer: MEDICAID

## 2024-02-01 NOTE — TELEPHONE ENCOUNTER
Text message congratulating patient on his HCV cure sent to patient from clinic magda phone, asked that he contact clinic if he has questions.

## 2024-02-01 NOTE — TELEPHONE ENCOUNTER
Hep C VL not detected, confirming Hep C is cured.  Please TEXT results to patient as requested & congratulate him on confirmed Hep C cure. Thank you.

## 2024-03-19 ENCOUNTER — TELEPHONE (OUTPATIENT)
Dept: INFECTIOUS DISEASES | Facility: CLINIC | Age: 59
End: 2024-03-19

## 2024-03-19 ENCOUNTER — CLINICAL SUPPORT (OUTPATIENT)
Dept: INFECTIOUS DISEASES | Facility: CLINIC | Age: 59
End: 2024-03-19
Payer: MEDICAID

## 2024-03-19 DIAGNOSIS — Z23 NEED FOR VACCINATION: Primary | ICD-10-CM

## 2024-03-19 DIAGNOSIS — A63.0 CONDYLOMA ACUMINATUM OF PENIS: ICD-10-CM

## 2024-03-19 PROCEDURE — 90636 HEP A/HEP B VACC ADULT IM: CPT | Mod: PBBFAC

## 2024-03-19 PROCEDURE — 90471 IMMUNIZATION ADMIN: CPT | Mod: PBBFAC

## 2024-03-19 RX ORDER — IMIQUIMOD 12.5 MG/.25G
CREAM TOPICAL
Qty: 16 PACKET | Refills: 1 | Status: SHIPPED | OUTPATIENT
Start: 2024-03-20 | End: 2024-05-20

## 2024-03-19 RX ADMIN — HEPATITIS A AND HEPATITIS B (RECOMBINANT) VACCINE 720 UNITS: 720; 20 INJECTION, SUSPENSION INTRAMUSCULAR at 09:03

## 2024-03-19 NOTE — TELEPHONE ENCOUNTER
Patient Advised. He would like you to send in another refill to last until he comes back to see Kenna. Thanks!

## 2024-03-19 NOTE — TELEPHONE ENCOUNTER
Pt came in today for his second twinrix but thought he had an appt to see Lori (she is out today). At appt on 1/30/24, he was given Aldara 5% cream for penile condyloma. He states that it is better, but not resolved and he was told that he may need something stronger. He is requesting stronger topical agent to try and will have Lori caputo at appt on 4/17/24 if still not resolved. Please let patient know when meds are sent to pharmacy.

## 2024-03-19 NOTE — TELEPHONE ENCOUNTER
Taylor,     The only other option is surgical removal. If the Aldara cream is working. Pt should continue to use it. It can be used for up to 16 weeks. If he needs another rx, I can send one.    Thank you,   GENTRY Crockett-BC

## 2024-04-01 DIAGNOSIS — I10 PRIMARY HYPERTENSION: ICD-10-CM

## 2024-04-01 RX ORDER — AMLODIPINE BESYLATE 10 MG/1
10 TABLET ORAL DAILY
Qty: 90 TABLET | Refills: 0 | Status: SHIPPED | OUTPATIENT
Start: 2024-04-01 | End: 2024-05-20 | Stop reason: SDUPTHER

## 2024-04-01 RX ORDER — LISINOPRIL 40 MG/1
40 TABLET ORAL DAILY
Qty: 90 TABLET | Refills: 0 | Status: SHIPPED | OUTPATIENT
Start: 2024-04-01 | End: 2024-05-20 | Stop reason: SDUPTHER

## 2024-05-14 ENCOUNTER — TELEPHONE (OUTPATIENT)
Dept: INFECTIOUS DISEASES | Facility: CLINIC | Age: 59
End: 2024-05-14
Payer: MEDICAID

## 2024-05-14 NOTE — TELEPHONE ENCOUNTER
----- Message from Roseanna Sebastian sent at 5/14/2024  1:37 PM CDT -----  Regarding: Missed a call  MANNY MCCAIN         Pt is returning a call from Ms. Paniagua.   Pt # [phone]

## 2024-05-20 ENCOUNTER — OFFICE VISIT (OUTPATIENT)
Dept: INTERNAL MEDICINE | Facility: CLINIC | Age: 59
End: 2024-05-20
Payer: MEDICAID

## 2024-05-20 ENCOUNTER — HOSPITAL ENCOUNTER (OUTPATIENT)
Dept: RADIOLOGY | Facility: HOSPITAL | Age: 59
Discharge: HOME | End: 2024-05-20
Attending: NURSE PRACTITIONER
Payer: MEDICAID

## 2024-05-20 ENCOUNTER — OFFICE VISIT (OUTPATIENT)
Dept: INFECTIOUS DISEASES | Facility: CLINIC | Age: 59
End: 2024-05-20
Payer: MEDICAID

## 2024-05-20 VITALS
BODY MASS INDEX: 28.77 KG/M2 | WEIGHT: 201 LBS | RESPIRATION RATE: 16 BRPM | HEART RATE: 99 BPM | DIASTOLIC BLOOD PRESSURE: 78 MMHG | TEMPERATURE: 99 F | SYSTOLIC BLOOD PRESSURE: 129 MMHG | HEIGHT: 70 IN

## 2024-05-20 VITALS
BODY MASS INDEX: 28.69 KG/M2 | DIASTOLIC BLOOD PRESSURE: 74 MMHG | HEIGHT: 70 IN | TEMPERATURE: 99 F | RESPIRATION RATE: 14 BRPM | HEART RATE: 101 BPM | SYSTOLIC BLOOD PRESSURE: 130 MMHG | WEIGHT: 200.38 LBS

## 2024-05-20 DIAGNOSIS — M25.561 CHRONIC PAIN OF BOTH KNEES: ICD-10-CM

## 2024-05-20 DIAGNOSIS — G89.29 CHRONIC PAIN OF BOTH KNEES: ICD-10-CM

## 2024-05-20 DIAGNOSIS — A63.0 CONDYLOMA ACUMINATUM OF PENIS: ICD-10-CM

## 2024-05-20 DIAGNOSIS — B19.20 HEPATITIS C VIRUS INFECTION WITHOUT HEPATIC COMA, UNSPECIFIED CHRONICITY: Primary | ICD-10-CM

## 2024-05-20 DIAGNOSIS — M25.561 CHRONIC PAIN OF RIGHT KNEE: ICD-10-CM

## 2024-05-20 DIAGNOSIS — I10 PRIMARY HYPERTENSION: Primary | ICD-10-CM

## 2024-05-20 DIAGNOSIS — G89.29 CHRONIC PAIN OF RIGHT KNEE: ICD-10-CM

## 2024-05-20 DIAGNOSIS — F17.210 CIGARETTE NICOTINE DEPENDENCE WITHOUT COMPLICATION: Chronic | ICD-10-CM

## 2024-05-20 DIAGNOSIS — Z23 IMMUNIZATION DUE: ICD-10-CM

## 2024-05-20 DIAGNOSIS — Z23 NEED FOR VACCINATION: Primary | ICD-10-CM

## 2024-05-20 DIAGNOSIS — J31.0 CHRONIC RHINITIS: ICD-10-CM

## 2024-05-20 DIAGNOSIS — M25.562 CHRONIC PAIN OF BOTH KNEES: ICD-10-CM

## 2024-05-20 DIAGNOSIS — F51.01 PRIMARY INSOMNIA: ICD-10-CM

## 2024-05-20 DIAGNOSIS — Z00.00 WELLNESS EXAMINATION: ICD-10-CM

## 2024-05-20 PROCEDURE — 4010F ACE/ARB THERAPY RXD/TAKEN: CPT | Mod: CPTII,,, | Performed by: NURSE PRACTITIONER

## 2024-05-20 PROCEDURE — 1159F MED LIST DOCD IN RCRD: CPT | Mod: CPTII,,, | Performed by: NURSE PRACTITIONER

## 2024-05-20 PROCEDURE — 90677 PCV20 VACCINE IM: CPT | Mod: PBBFAC

## 2024-05-20 PROCEDURE — 3075F SYST BP GE 130 - 139MM HG: CPT | Mod: CPTII,,, | Performed by: NURSE PRACTITIONER

## 2024-05-20 PROCEDURE — 99214 OFFICE O/P EST MOD 30 MIN: CPT | Mod: S$PBB,,, | Performed by: NURSE PRACTITIONER

## 2024-05-20 PROCEDURE — 3008F BODY MASS INDEX DOCD: CPT | Mod: CPTII,,, | Performed by: NURSE PRACTITIONER

## 2024-05-20 PROCEDURE — 99406 BEHAV CHNG SMOKING 3-10 MIN: CPT | Mod: S$PBB,,, | Performed by: NURSE PRACTITIONER

## 2024-05-20 PROCEDURE — 73564 X-RAY EXAM KNEE 4 OR MORE: CPT | Mod: TC,LT

## 2024-05-20 PROCEDURE — 99214 OFFICE O/P EST MOD 30 MIN: CPT | Mod: 25,S$PBB,, | Performed by: NURSE PRACTITIONER

## 2024-05-20 PROCEDURE — 99215 OFFICE O/P EST HI 40 MIN: CPT | Mod: PBBFAC,27,25 | Performed by: NURSE PRACTITIONER

## 2024-05-20 PROCEDURE — 99213 OFFICE O/P EST LOW 20 MIN: CPT | Mod: PBBFAC,25 | Performed by: NURSE PRACTITIONER

## 2024-05-20 PROCEDURE — 3078F DIAST BP <80 MM HG: CPT | Mod: CPTII,,, | Performed by: NURSE PRACTITIONER

## 2024-05-20 PROCEDURE — 90471 IMMUNIZATION ADMIN: CPT | Mod: PBBFAC

## 2024-05-20 PROCEDURE — 1160F RVW MEDS BY RX/DR IN RCRD: CPT | Mod: CPTII,,, | Performed by: NURSE PRACTITIONER

## 2024-05-20 PROCEDURE — 3074F SYST BP LT 130 MM HG: CPT | Mod: CPTII,,, | Performed by: NURSE PRACTITIONER

## 2024-05-20 PROCEDURE — 73564 X-RAY EXAM KNEE 4 OR MORE: CPT | Mod: TC,RT

## 2024-05-20 RX ORDER — DICLOFENAC SODIUM 10 MG/G
2 GEL TOPICAL 4 TIMES DAILY
Qty: 100 G | Refills: 6 | Status: SHIPPED | OUTPATIENT
Start: 2024-05-20

## 2024-05-20 RX ORDER — PODOFILOX 5 MG/ML
SOLUTION TOPICAL 2 TIMES DAILY
Qty: 3.5 ML | Refills: 0 | Status: SHIPPED | OUTPATIENT
Start: 2024-05-20 | End: 2024-05-20 | Stop reason: ALTCHOICE

## 2024-05-20 RX ORDER — MELOXICAM 15 MG/1
15 TABLET ORAL DAILY PRN
Qty: 90 TABLET | Refills: 1 | Status: SHIPPED | OUTPATIENT
Start: 2024-05-20 | End: 2024-11-16

## 2024-05-20 RX ORDER — LISINOPRIL 40 MG/1
40 TABLET ORAL DAILY
Qty: 90 TABLET | Refills: 2 | Status: SHIPPED | OUTPATIENT
Start: 2024-05-20 | End: 2025-02-14

## 2024-05-20 RX ORDER — PODOFILOX 5 MG/ML
SOLUTION TOPICAL 2 TIMES DAILY
Qty: 3.5 ML | Refills: 0 | Status: SHIPPED | OUTPATIENT
Start: 2024-05-20 | End: 2024-05-20 | Stop reason: SDUPTHER

## 2024-05-20 RX ORDER — AMLODIPINE BESYLATE 10 MG/1
10 TABLET ORAL DAILY
Qty: 90 TABLET | Refills: 2 | Status: SHIPPED | OUTPATIENT
Start: 2024-05-20 | End: 2025-02-14

## 2024-05-20 RX ORDER — LORATADINE 10 MG/1
10 TABLET ORAL DAILY
Qty: 90 TABLET | Refills: 3 | Status: SHIPPED | OUTPATIENT
Start: 2024-05-20 | End: 2025-05-20

## 2024-05-20 RX ORDER — PREDNISONE 20 MG/1
20 TABLET ORAL 2 TIMES DAILY
Qty: 10 TABLET | Refills: 0 | Status: SHIPPED | OUTPATIENT
Start: 2024-05-20 | End: 2024-05-25

## 2024-05-20 RX ADMIN — PNEUMOCOCCAL 20-VALENT CONJUGATE VACCINE 0.5 ML
2.2; 2.2; 2.2; 2.2; 2.2; 2.2; 2.2; 2.2; 2.2; 2.2; 2.2; 2.2; 2.2; 2.2; 2.2; 2.2; 4.4; 2.2; 2.2; 2.2 INJECTION, SUSPENSION INTRAMUSCULAR at 09:05

## 2024-05-20 NOTE — PROGRESS NOTES
"Patient ID: Xavier Harmon 58 y.o.     Chief Complaint:   Chief Complaint   Patient presents with    Followup Hep C      States cream is still not working for "bumps" to penis        HPI:    5/20/24  Xavier is a 57 yo AAM presenting today for HCV f/u visit.  SVR confirmed 1/30/24 with HCV RNA not detected. He denies any risk factors for HCV re-exposure. He tells me that Aldara cream was not effective for penile warts. Will treat with Condylox and he is to notify me if not effective. Warts unchanged.  Will refer for cryo if needed. Voiced appreciation. Will be due for Twinrix #3 in 2 months. Will schedule to coordinate with his next 14 days home from work. Voiced appreciation. Post-HCV cure education provided. All questions answered & concerns addressed.    1/30/24  Xavier is a 57 yo AAM here today for HCV f/u visit. He completed 12 weeks of Epclusa around 3/21/23 as prescribed. He has had difficulty with attending visits for f/u due [place]. Will collect labs today to assess for SVR status. He has no identifiable risk factors for HCV re-exposures.  He denies jaundice, icterus, nausea, vomiting, dark urine, or adilia colored stools.  BP elevated today, drank an energy drink this am. He does wish to restart Hep A/B vaccination series, will schedule nurse visits for 2nd & 3rd doses. He tells me that he does have 2 warts to penile shaft that are bothersome. He has never received treatment for same. Appreciates topical home treatment option, will send prescription for same. Voiced appreciation. He also states that he has chronic right knee pain, has previously used diclofenac tablets for same with good response. Will send a few tablets to bridge until he can f/u with PCP for same. Voiced appreciation. All questions answered & concerns addressed.     12/12/22  Xavier is a 57 AAM presenting today for HCV f/u visit & lab results.  He is eager to start treatment asap and tells me that he " interviewed for a job today.  He will have pre-employment physical tomorrow & hopes to start job next week.  He will be working in [place], 1st hitch will be about 6 weeks.  He agrees to take Epclusa daily as prescribed and will either arrange for someone to mail it to him, or delay start by a couple of weeks so that he is back for 4 week refill  & avoid treatment interruption.  He appreciates starting Twinrix vaccination series & 2nd dose of Shingrix today.  All questions answered & concerns addressed.      11/22/22  Xavier is a 56 yo AAM presenting today for HCV initial evaluation.  He was diagnosed 8/22 and referred by PCP DANIE Chery NP.  He is treatment naive.  RUQ abdominal u/s completed 10/19/22, significant for steatosis.  He had FibroScan completed today, pending results.  He was unable to come in for labs, will collect today.  He denies any history of blood transfusion, tattoos, IVDU, or known HCV + sexual partners.  He was a plasma donor in the 1980s.  Lab confirmed HCV infection with 4.8 mil copies of virus 8/18/22.  Will get additional labs & he agrees to return next week for results & treatment plan.                   Past Medical History:   Diagnosis Date    Chronic pain of right knee 8/18/2022    Cigarette nicotine dependence without complication 6/6/2022    Elevated LFTs 8/26/2022    Hepatitis C antibody test positive 8/18/2022    HTN (hypertension)     Illicit drug use 6/6/2022    Lymphadenopathy 8/18/2022    Mass of skin of back 8/18/2022    Obesity, unspecified     Open wound of midline of back 11/4/2022    Prediabetes 6/6/2022    Primary hypertension 6/6/2022    Primary osteoarthritis of right knee 8/26/2022    Tobacco abuse     Vitamin D deficiency 8/18/2022        Past Surgical History:   Procedure Laterality Date    EXCISION OF MASS OF BACK N/A 10/3/2022    Procedure: EXCISION, MASS, BACK;  Surgeon: Nick Grewal MD;  [facility];  Service: General;  Laterality: N/A;  Needs prone  "positioning.        Social History     Socioeconomic History    Marital status: Single   Tobacco Use    Smoking status: Every Day     Current packs/day: 0.50     Average packs/day: 0.5 packs/day for 48.9 years (24.5 ttl pk-yrs)     Types: Cigarettes     Start date: 6/21/1975    Smokeless tobacco: Never   Substance and Sexual Activity    Alcohol use: Yes     Comment: occassioanlly    Drug use: Not Currently     Types: "Crack" cocaine     Comment: States been a little while    Sexual activity: Yes     Partners: Female     Birth control/protection: Condom     Social Determinants of Health     Financial Resource Strain: Low Risk  (10/3/2023)    Overall Financial Resource Strain (CARDIA)     Difficulty of Paying Living Expenses: Not hard at all   Food Insecurity: No Food Insecurity (10/3/2023)    Hunger Vital Sign     Worried About Running Out of Food in the Last Year: Never true     Ran Out of Food in the Last Year: Never true   Transportation Needs: No Transportation Needs (10/3/2023)    PRAPARE - Transportation     Lack of Transportation (Medical): No     Lack of Transportation (Non-Medical): No   Physical Activity: Insufficiently Active (10/3/2023)    Exercise Vital Sign     Days of Exercise per Week: 2 days     Minutes of Exercise per Session: 30 min   Stress: No Stress Concern Present (10/3/2023)    [employer] [employer] - Occupational Stress Questionnaire     Feeling of Stress : Not at all   Housing Stability: Low Risk  (10/3/2023)    Housing Stability Vital Sign     Unable to Pay for Housing in the Last Year: No     Number of Places Lived in the Last Year: 1     Unstable Housing in the Last Year: No        Family History   Problem Relation Name Age of Onset    Hypertension Mother      Diabetes Mother      Stroke Father Steven Hamron     No Known Problems Daughter          Review of patient's allergies indicates:  No Known Allergies     Immunization History   Administered Date(s) Administered    " "COVID-19, MRNA, LN-S, PF (MODERNA FULL 0.5 ML DOSE) 08/22/2021    Hepatitis A / Hepatitis B 12/12/2022, 01/30/2024, 03/19/2024    Influenza - Quadrivalent - PF *Preferred* (6 months and older) 10/27/2022, 10/03/2023    Tdap 08/18/2022    Zoster Recombinant 08/18/2022, 12/12/2022        Review of Systems   Constitutional: Negative.    HENT: Negative.     Eyes: Negative.    Respiratory: Negative.     Cardiovascular: Negative.    Gastrointestinal: Negative.    Genitourinary: Negative.    Musculoskeletal: Negative.    Skin: Negative.    Neurological: Negative.    Endo/Heme/Allergies: Negative.    Psychiatric/Behavioral: Negative.     All other systems reviewed and are negative.         Objective:      /74 (BP Location: Left arm, Patient Position: Sitting, BP Method: Medium (Automatic))   Pulse 101   Temp 98.5 °F (36.9 °C) (Oral)   Resp 14   Ht 5' 10" (1.778 m)   Wt 90.9 kg (200 lb 6.4 oz)   BMI 28.75 kg/m²      Physical Exam  Vitals reviewed.   Constitutional:       General: He is not in acute distress.     Appearance: Normal appearance. He is not toxic-appearing.   Eyes:      General: No scleral icterus.  Cardiovascular:      Rate and Rhythm: Normal rate and regular rhythm.      Heart sounds: Normal heart sounds.   Pulmonary:      Effort: Pulmonary effort is normal. No respiratory distress.      Breath sounds: Normal breath sounds.   Abdominal:      General: Bowel sounds are normal. There is no distension.      Palpations: Abdomen is soft. There is no mass.      Tenderness: There is no abdominal tenderness.   Musculoskeletal:         General: Normal range of motion.   Skin:     General: Skin is warm and dry.   Neurological:      Mental Status: He is alert and oriented to person, place, and time.          Labs:   Lab Results   Component Value Date    WBC 10.24 10/02/2023    HGB 15.8 10/02/2023    HCT 52.0 10/02/2023    MCV 71.7 (L) 10/02/2023     10/02/2023       CMP  Sodium   Date Value Ref Range " Status   10/02/2023 143 136 - 145 mmol/L Final     Potassium   Date Value Ref Range Status   10/02/2023 4.4 3.5 - 5.1 mmol/L Final     CO2   Date Value Ref Range Status   10/02/2023 31 (H) 22 - 29 mmol/L Final     Blood Urea Nitrogen   Date Value Ref Range Status   10/02/2023 11.6 8.4 - 25.7 mg/dL Final     Creatinine   Date Value Ref Range Status   10/02/2023 1.19 (H) 0.73 - 1.18 mg/dL Final     Calcium   Date Value Ref Range Status   10/02/2023 9.2 8.4 - 10.2 mg/dL Final     Albumin   Date Value Ref Range Status   10/02/2023 3.5 3.5 - 5.0 g/dL Final     Bilirubin Total   Date Value Ref Range Status   10/02/2023 0.7 <=1.5 mg/dL Final     ALP   Date Value Ref Range Status   10/02/2023 82 40 - 150 unit/L Final     AST   Date Value Ref Range Status   10/02/2023 33 5 - 34 unit/L Final     ALT   Date Value Ref Range Status   10/02/2023 28 0 - 55 unit/L Final     eGFR   Date Value Ref Range Status   10/02/2023 >60 mls/min/1.73/m2 Final     Lab Results   Component Value Date    TSH 1.472 10/02/2023     HCV Genotype   Date Value Ref Range Status   11/22/2022 1 (A) Undetected Final     Comment:     A definitive genotype (i.e., 1 or 6) or subtype (i.e., 1a   or 1b) could not be assigned. HCV genotype resolution   testing is ordered.     -------------------ADDITIONAL INFORMATION-------------------  This test was performed using the [employer] RealTime HCV   Genotype II assay ([employer] [place], [place]).     Test Performed by:  [address] [address]  : Narendra Kirk M.D. Ph.D.; CLIA# [id number]     HCV RNA Detect/Quant   Date Value Ref Range Status   01/30/2024 Undetected Undetected IU/mL Final     Comment:     Result in log IU/mL is Undetected.     -------------------ADDITIONAL INFORMATION-------------------  The quantification range of this assay is 15 to 100,000,000   IU/mL (1.18 log to 8.00 log IU/mL). Testing was performed    using the robert HCV test ([employer]     Test Performed by:  [facility]  [address] [place] [address]  : Narendra Kirk M.D. Ph.D.; CLIA# [id number]     HIV   Date Value Ref Range Status   11/22/2022 Nonreactive Nonreactive Final     Syphilis Antibody   Date Value Ref Range Status   11/22/2022 Nonreactive Nonreactive, Equivocal Final     Hep BsAg Interp   Date Value Ref Range Status   11/22/2022 Nonreactive Nonreactive Final     Hep BsAb Interp   Date Value Ref Range Status   11/22/2022 Nonreactive Nonreactive Final     Hep BsAb   Date Value Ref Range Status   11/22/2022 0.26 mIU/mL Final     Comment:     Interpretive Data Hep Bs Ab#  Result (mIU/mL)Interpretation - Hep B Surface Antibody  <8.00Nonreactive: Patient considered not immune to HBV infection  >=8.00 to <12.00Grayzone: Unable to determine immune status. Follow-up testing should be performed  >=12.00Reactive: Patient considered immune to HBV infection       Hep BcAb Interp   Date Value Ref Range Status   11/22/2022 Nonreactive Nonreactive Final     Hep A IgG Interp   Date Value Ref Range Status   11/22/2022 Nonreactive Nonreactive Final     FibroTest Stage   Date Value Ref Range Status   11/22/2022 F1-F2  Final     ActiTest Grade   Date Value Ref Range Status   11/22/2022 A3  Final     Alpha-2-Macroglobulin   Date Value Ref Range Status   11/22/2022 213 100 - 280 mg/dL Final     Haptoglobin   Date Value Ref Range Status   11/22/2022 160 30 - 200 mg/dL Final     Alanine Aminotransferase (ALT)   Date Value Ref Range Status   11/22/2022 111 (H) 7 - 55 U/L Final     Gamma Glutamyltransferase (GGT)   Date Value Ref Range Status   11/22/2022 282 (H) 8 - 61 U/L Final     Bilirubin, Total   Date Value Ref Range Status   11/22/2022 0.5 <=1.2 mg/dL Final     Comment:        Test Performed by:  [facility]  [address] [facility]  [place] [address]  : Narendra Kirk M.D. Ph.D.; CLIA# [id number]     Test Performed by:  [facility]  [address] [place] [address]  : Narendra Kirk M.D. Ph.D.; CLIA# [id number]     Ferritin Level   Date Value Ref Range Status   11/22/2022 457.72 (H) 21.81 - 274.66 ng/mL Final       Imaging: Reviewed most recent relevant imaging studies available, notable results highlighted in this note      Medications:     Current Outpatient Medications   Medication Instructions    amLODIPine (NORVASC) 10 mg, Oral, Daily, For High Blood Pressure    diclofenac (VOLTAREN) 75 mg, Oral, 2 times daily PRN    diclofenac sodium (VOLTAREN) 2 g, Topical (Top), 4 times daily, To right knee.    HYDROcodone-acetaminophen (NORCO) 5-325 mg per tablet 1 tablet    imiquimod (ALDARA) 5 % cream Topical (Top), Three times weekly    lisinopriL (PRINIVIL,ZESTRIL) 40 mg, Oral, Daily, For High Blood Pressure    podofilox (CONDYLOX) 0.5 % external solution Topical (Top), 2 times daily, For 3 days, then hold for 4 days. May repeat course 4 times.       Assessment:       Problem List Items Addressed This Visit          GI    Hepatitis C - Primary     Other Visit Diagnoses       Condyloma acuminatum of penis                   Plan:      Hepatitis C virus infection without hepatic coma, unspecified chronicity  History: Diagnosed 8/22  Treatment naive.  GT 1a, baseline VL 3.19 mil copies.  Fibrosure: A3, F1-2.  FibroScan 11/22/22: S2-3, F3.   RUQ abdominal u/s: 10/22 steatosis.  Blood precautions: do not share a razor, needle, toothbrush, clippers with anyone.  Completed Epclusa 1 po daily x 12 weeks, 12/27/22-3/21/23.   HCV RNA not detected 1/30/24.  Post-treatment counseling provided as follows:     Congratulations on your Hepatitis C cure, to minimize risk of re-infection or progression of liver damage please consider the following recommendations.   Do not share a razor,  toothbrush, needle, clippers with anyone.   Avoid all illicit drugs.  Minimize alcohol intake.   Hep C antibody will be positive for life, but does not provide immunity.  If you need repeat testing for Hepatitis C reinfection, will require Hepatitis C Viral Load (RNA) test.  Do not donate blood.  RTC PRN.   Follow-up with PCP for routine medical needs.     Condyloma acuminatum of penis  -     Podofilox (CONDYLOX) 0.5 % external solution; Apply topically 2 (two) times daily. For 3 days, then hold for 4 days. May repeat course 4 times.  Dispense: 3.5 mL; Refill: 0  Notify me if not effective, will refer to minor surgery for cryotherapy if needed.     Need for vaccination  Please schedule nurse visit for Twinrix #3 between 7/2/24-7/16/24. Order placed.

## 2024-05-20 NOTE — PROGRESS NOTES
GENTRY Westbrook   [facility]  [address] [facility] [address]      PATIENT NAME: Xavier Harmon  : 1965  DATE: 24  MRN: [id number]      Patient PCP Information       Provider PCP Type    GENTRY Westbrook [facility]            Reason for Visit / Chief Complaint: Health Maintenance (Knees hurting while working long hours , anxiety - can't sleep enough to work his 12 hour shifts. )       History of Present Illness / Problem Focused Workflow     Xavier Harmon presents to the clinic with Health Maintenance (Knees hurting while working long hours , anxiety - can't sleep enough to work his 12 hour shifts. )     59 yo male here today for f/u. Medical problems includes HTN, HCV, and Tobacco use (1/2 ppd), 30 ppy hx).     Prostate Cancer Screening - 10/05/23 PSA 0.57  Colon Cancer Screening - 10/10/23 FIT Negative   Osteoporosis Screening - 10/05/23 Vitamin D level 43.4  STI Screening - [facility]   Lung Cancer Screening - 10/03/2023 Ordered  Wellness - 10/03/2023     10/03/2023  Pt here today for routine wellness f/u with labs completed; reviewed and discussed with no questions or concerns. Meds reviewed and refilled appropriately. BP elevated today, pt reports he has been out of his meds, no med refill request / pt calls noted on chart, will refill meds today. Pt reports that it is difficult for him to make it here for both Primary Care Services and also for ID clinic. Pt lives in [facility], will send referral today to [facility] for PCP services, clinic number given to pt today as well. Will drop off FIT test when he comes. Denies any acute issues today.     24  Pt here today for f/u appt. BP at goal today, reports compliance with meds. Meds reviewed and refilled appropriately.   Will f/u in 6 months for routine wellness OV with labs and prn. Denies any other issues today. Agreeable to PCV 20 today.   Bilateral Knee  Pain , agreeable to knee x-rays today, will f/u  with results and pend referring to Ortho clinic. Pt reports Voltaren gel works well, will refill today, but oral diclofenac does not help. Will change to Meloxicam daily prn today. Reports issues with sleep for years; reports trouble staying asleep and falling asleep; reports probably getting about 4 hours of sleep per night, does not nap during the day time as he usually works 12 hour shifts. Has recently started taking Melatonin, unsure of efficacy yet. Pt reports that he snores and also gasps for air and feels like he can't breath at times. ESS of 16. Will send referral to Sleep lab today for eval. Pt also reports with daily allergies, runny nose and congestion, is not currently taking any allergy meds,will send loratadine for daily use and short dose prednisone.Denies any other issues today.     Heartwell Sleepiness Scale  Sitting and reading: High chance of dozing  Watching TV: High chance of dozing  Sitting, inactive in a public place (e.g. a theatre or a meeting): Slight chance of dozing  As a passenger in a car for an hour without a break: High chance of dozing  Lying down to rest in the afternoon when circumstances permit: Moderate chance of dozing  Sitting and talking to someone: Would never doze  Sitting quietly after a lunch without alcohol: High chance of dozing  In a car, while stopped for a few minutes in traffic: Slight chance of dozing  Total score: 16            Review of Systems     Review of Systems   Constitutional: Negative.    HENT: Negative.     Eyes: Negative.    Respiratory: Negative.     Cardiovascular: Negative.    Gastrointestinal: Negative.    Endocrine: Negative.    Genitourinary: Negative.    Neurological: Negative.    Psychiatric/Behavioral: Negative.           Medications and Allergies     Medications  Current Outpatient Medications   Medication Instructions    amLODIPine (NORVASC) 10 mg, Oral, Daily, For High Blood Pressure     "diclofenac sodium (VOLTAREN) 2 g, Topical (Top), 4 times daily, To right knee.    lisinopriL (PRINIVIL,ZESTRIL) 40 mg, Oral, Daily, For High Blood Pressure    loratadine (CLARITIN) 10 mg, Oral, Daily, For Allergies    meloxicam (MOBIC) 15 mg, Oral, Daily PRN    predniSONE (DELTASONE) 20 mg, Oral, 2 times daily         Allergies  Review of patient's allergies indicates:  No Known Allergies    Physical Examination     Visit Vitals  /78   Pulse 99   Temp 98.6 °F (37 °C)   Resp 16   Ht 5' 10" (1.778 m)   Wt 91.2 kg (201 lb)   BMI 28.84 kg/m²       Physical Exam  Vitals reviewed.   Constitutional:       Appearance: Normal appearance. He is normal weight.   HENT:      Head: Normocephalic.   Cardiovascular:      Rate and Rhythm: Normal rate and regular rhythm.      Pulses: Normal pulses.      Heart sounds: Normal heart sounds.   Pulmonary:      Effort: Pulmonary effort is normal.      Breath sounds: Normal breath sounds.   Abdominal:      General: Abdomen is flat.      Palpations: Abdomen is soft.   Musculoskeletal:         General: Normal range of motion.      Cervical back: Normal range of motion.   Skin:     General: Skin is warm and dry.   Neurological:      Mental Status: He is alert.   Psychiatric:         Mood and Affect: Mood normal.           Results     Lab Results   Component Value Date    WBC 10.24 10/02/2023    RBC 7.25 (H) 10/02/2023    HGB 15.8 10/02/2023    HCT 52.0 10/02/2023    MCV 71.7 (L) 10/02/2023    MCH 21.8 (L) 10/02/2023    MCHC 30.4 (L) 10/02/2023    RDW 19.6 (H) 10/02/2023     10/02/2023    MPV 10.0 10/02/2023     CMP  Sodium   Date Value Ref Range Status   10/02/2023 143 136 - 145 mmol/L Final     Potassium   Date Value Ref Range Status   10/02/2023 4.4 3.5 - 5.1 mmol/L Final     CO2   Date Value Ref Range Status   10/02/2023 31 (H) 22 - 29 mmol/L Final     Blood Urea Nitrogen   Date Value Ref Range Status   10/02/2023 11.6 8.4 - 25.7 mg/dL Final     Creatinine   Date Value Ref " Range Status   10/02/2023 1.19 (H) 0.73 - 1.18 mg/dL Final     Calcium   Date Value Ref Range Status   10/02/2023 9.2 8.4 - 10.2 mg/dL Final     Albumin   Date Value Ref Range Status   10/02/2023 3.5 3.5 - 5.0 g/dL Final     Bilirubin Total   Date Value Ref Range Status   10/02/2023 0.7 <=1.5 mg/dL Final     ALP   Date Value Ref Range Status   10/02/2023 82 40 - 150 unit/L Final     AST   Date Value Ref Range Status   10/02/2023 33 5 - 34 unit/L Final     ALT   Date Value Ref Range Status   10/02/2023 28 0 - 55 unit/L Final     Estimated GFR-Non    Date Value Ref Range Status   07/08/2018 >60 mL/min/1.73 m2 Final     Lab Results   Component Value Date    CHOL 212 (H) 10/02/2023     Lab Results   Component Value Date    HDL 85 (H) 10/02/2023     Lab Results   Component Value Date    TRIG 107 10/02/2023     Lab Results   Component Value Date    VLDL 21 10/02/2023     Lab Results   Component Value Date    .00 10/02/2023     Lab Results   Component Value Date    TSH 1.472 10/02/2023         Assessment        ICD-10-CM ICD-9-CM   1. Primary hypertension  I10 401.9   2. Immunization due  Z23 V05.9   3. Cigarette nicotine dependence without complication  F17.210 305.1   4. Wellness examination  Z00.00 V70.0   5. Chronic pain of both knees  M25.561 719.46    M25.562 338.29    G89.29    6. Chronic pain of right knee  M25.561 719.46    G89.29 338.29   7. Primary insomnia  F51.01 307.42   8. Chronic rhinitis  J31.0 472.0        Plan      Problem List Items Addressed This Visit          ENT    Chronic rhinitis    Current Assessment & Plan     Start RX loratadine 10 mg daily  OTC antihistamines as needed (i.e. Benadryl)  Increase PO Fluids  Avoid/limit triggers such as pollen, dust, molds, and pet dander.   Avoid smoke, strong chemicals, cleaning products, perfumes/colognes.           Relevant Medications    loratadine (CLARITIN) 10 mg tablet    predniSONE (DELTASONE) 20 MG tablet       Cardiac/Vascular     Primary hypertension - Primary (Chronic)    Current Assessment & Plan     BP at goal   Continue RX lisinopril 40 mg and amlodipine 10 mg daily   BP: 129/78  Low Sodium Diet (Dash Diet - less than 2 grams of sodium per day).  Maintain healthy weight with goal BMI <30. Exercise 30 minutes per day 5 days per week.           Relevant Medications    amLODIPine (NORVASC) 10 MG tablet    lisinopriL (PRINIVIL,ZESTRIL) 40 MG tablet       Orthopedic    Chronic pain of both knees    Current Assessment & Plan     Bilateral Knee X-rays today  Start RX Meloxicam 15 mg daily prn  Stop diclofenac  Continue RX voltaren gel  Knee X-rays on Chart  Perform knee stretches daily  Exercise as tolerated (low impact)  Avoid activities that increase the pain or cause stiffness.  Apply heating pad, ice pack, OTC topical as needed.  Massage to loosen muscles.  Continues NSAID as needed  F/U if worsening              Relevant Medications    diclofenac sodium (VOLTAREN) 1 % Gel    meloxicam (MOBIC) 15 MG tablet    predniSONE (DELTASONE) 20 MG tablet    Other Relevant Orders    X-Ray Knee Complete 4 Or More Views Right    X-Ray Knee Complete 4 or More Views Left       Other    Cigarette nicotine dependence without complication (Chronic)    Current Assessment & Plan     Dangers of cigarette smoking were reviewed with patient in detail. Patient was Counseled for 3-10 minutes. Nicotine replacement options were discussed. Nicotine replacement was discussed- not prescribed per patient's request  LC Screening Ordered         Relevant Orders    CT Chest Lung Screening Low Dose    Primary insomnia    Current Assessment & Plan     Continue RX melatonin  Referral to sleep lab  Avoid caffeine, alcohol and stimulants.  Do not use illicit drugs.  Practice positive phrases and repeat throughout the day, yoga, lavender scents or Chamomile tea to promote relaxation.  Set healthy boundaries, avoid people and conversations that increase stress.  Power down  electronic devices at least one hour prior to bedtime.  Keep room dark; use eye mask or relaxation sound machine to promote rest.  Sleep hygiene refers to actions that tend to improve and maintain good sleep:  Sleep as long as necessary to feel rested (usually seven to eight hours for adults) and then get out of bed  Maintain a regular sleep schedule, particularly a regular wake-up time in the morning  Avoid smoking or other nicotine intake, particularly during the evening  Avoid daytime naps, especially if they are longer than 20 to 30 minutes or occur late in the day.           Relevant Orders    Ambulatory referral/consult to Sleep Disorders     Other Visit Diagnoses       Immunization due        Relevant Medications    pneumoc 20-vinod conj-dip cr(PF) (PREVNAR-20 (PF)) injection Syrg 0.5 mL (Completed)    Wellness examination        Relevant Orders    TSH    PSA, Screening    Hemoglobin A1C    Vitamin D    Comprehensive Metabolic Panel    Urinalysis, Reflex to Urine Culture    Lipid Panel    CBC Auto Differential            Future Appointments   Date Time Provider Department Center   7/8/2024  9:00 AM [facility]   8/1/2024  9:30 AM [facility]    10/22/2024  7:00 [facility]        Follow up in about 5 months (around 10/22/2024) for Wellness.      Signature:     [facility]  [address] [facility] [address]    Date of encounter: 5/20/24

## 2024-05-20 NOTE — PROGRESS NOTES
Please advise the patient as we discussed, his knee x-rays showed degenerative changes r/t arthritis. I will send a referral to [facility] today for evaluation. They will contact him to set up an appointment.     Thanks,    GENTRY Iraheta

## 2024-05-23 ENCOUNTER — TELEPHONE (OUTPATIENT)
Dept: INTERNAL MEDICINE | Facility: CLINIC | Age: 59
End: 2024-05-23
Payer: MEDICAID

## 2024-05-23 NOTE — TELEPHONE ENCOUNTER
----- Message from GENTRY Westbrook sent at 5/20/2024 11:30 AM CDT -----  Please advise the patient as we discussed, his knee x-rays showed degenerative changes r/t arthritis. I will send a referral to [facility] today for evaluation. They will contact him to set up an appointment.     Thanks,    GENTRY Iraheta

## 2024-06-06 DIAGNOSIS — G47.33 OSA (OBSTRUCTIVE SLEEP APNEA): Primary | ICD-10-CM

## 2024-07-10 ENCOUNTER — PROCEDURE VISIT (OUTPATIENT)
Dept: SLEEP MEDICINE | Facility: HOSPITAL | Age: 59
End: 2024-07-10
Attending: NURSE PRACTITIONER
Payer: MEDICAID

## 2024-07-10 DIAGNOSIS — G47.33 OSA (OBSTRUCTIVE SLEEP APNEA): ICD-10-CM

## 2024-07-10 PROCEDURE — 95811 POLYSOM 6/>YRS CPAP 4/> PARM: CPT

## 2024-07-11 ENCOUNTER — CLINICAL SUPPORT (OUTPATIENT)
Dept: INFECTIOUS DISEASES | Facility: CLINIC | Age: 59
End: 2024-07-11
Payer: MEDICAID

## 2024-07-11 DIAGNOSIS — Z23 NEED FOR VACCINATION: Primary | ICD-10-CM

## 2024-07-11 PROCEDURE — 90471 IMMUNIZATION ADMIN: CPT | Mod: PBBFAC

## 2024-07-11 PROCEDURE — 90636 HEP A/HEP B VACC ADULT IM: CPT | Mod: PBBFAC

## 2024-07-11 PROCEDURE — 99211 OFF/OP EST MAY X REQ PHY/QHP: CPT | Mod: PBBFAC,25

## 2024-07-11 RX ADMIN — HEPATITIS A AND HEPATITIS B (RECOMBINANT) VACCINE 720 UNITS: 720; 20 INJECTION, SUSPENSION INTRAMUSCULAR at 07:07

## 2024-10-14 ENCOUNTER — TELEPHONE (OUTPATIENT)
Dept: INTERNAL MEDICINE | Facility: CLINIC | Age: 59
End: 2024-10-14

## 2024-10-19 NOTE — ASSESSMENT & PLAN NOTE
BP at goal   Continue RX lisinopril 40 mg and amlodipine 10 mg daily   BP: 129/78  Low Sodium Diet (Dash Diet - less than 2 grams of sodium per day).  Maintain healthy weight with goal BMI <30. Exercise 30 minutes per day 5 days per week.    
Bilateral Knee X-rays today  Start RX Meloxicam 15 mg daily prn  Stop diclofenac  Continue RX voltaren gel  Knee X-rays on Chart  Perform knee stretches daily  Exercise as tolerated (low impact)  Avoid activities that increase the pain or cause stiffness.  Apply heating pad, ice pack, OTC topical as needed.  Massage to loosen muscles.  Continues NSAID as needed  F/U if worsening       
Continue RX melatonin  Referral to sleep lab  Avoid caffeine, alcohol and stimulants.  Do not use illicit drugs.  Practice positive phrases and repeat throughout the day, yoga, lavender scents or Chamomile tea to promote relaxation.  Set healthy boundaries, avoid people and conversations that increase stress.  Power down electronic devices at least one hour prior to bedtime.  Keep room dark; use eye mask or relaxation sound machine to promote rest.  Sleep hygiene refers to actions that tend to improve and maintain good sleep:  Sleep as long as necessary to feel rested (usually seven to eight hours for adults) and then get out of bed  Maintain a regular sleep schedule, particularly a regular wake-up time in the morning  Avoid smoking or other nicotine intake, particularly during the evening  Avoid daytime naps, especially if they are longer than 20 to 30 minutes or occur late in the day.    
Dangers of cigarette smoking were reviewed with patient in detail. Patient was Counseled for 3-10 minutes. Nicotine replacement options were discussed. Nicotine replacement was discussed- not prescribed per patient's request  LC Screening Ordered  
Start RX loratadine 10 mg daily  OTC antihistamines as needed (i.e. Benadryl)  Increase PO Fluids  Avoid/limit triggers such as pollen, dust, molds, and pet dander.   Avoid smoke, strong chemicals, cleaning products, perfumes/colognes.    
SB

## 2024-10-29 ENCOUNTER — OFFICE VISIT (OUTPATIENT)
Dept: ORTHOPEDICS | Facility: CLINIC | Age: 59
End: 2024-10-29
Payer: MEDICAID

## 2024-10-29 ENCOUNTER — HOSPITAL ENCOUNTER (OUTPATIENT)
Dept: RADIOLOGY | Facility: HOSPITAL | Age: 59
Discharge: HOME | End: 2024-10-29
Attending: STUDENT IN AN ORGANIZED HEALTH CARE EDUCATION/TRAINING PROGRAM
Payer: MEDICAID

## 2024-10-29 VITALS
WEIGHT: 203.25 LBS | DIASTOLIC BLOOD PRESSURE: 81 MMHG | HEIGHT: 70 IN | SYSTOLIC BLOOD PRESSURE: 138 MMHG | HEART RATE: 109 BPM | BODY MASS INDEX: 29.1 KG/M2

## 2024-10-29 DIAGNOSIS — M25.561 PAIN IN BOTH KNEES, UNSPECIFIED CHRONICITY: ICD-10-CM

## 2024-10-29 DIAGNOSIS — M25.562 PAIN IN BOTH KNEES, UNSPECIFIED CHRONICITY: ICD-10-CM

## 2024-10-29 DIAGNOSIS — M17.0 PRIMARY OSTEOARTHRITIS OF BOTH KNEES: Primary | ICD-10-CM

## 2024-10-29 PROCEDURE — 99213 OFFICE O/P EST LOW 20 MIN: CPT | Mod: PBBFAC,25 | Performed by: STUDENT IN AN ORGANIZED HEALTH CARE EDUCATION/TRAINING PROGRAM

## 2024-10-29 PROCEDURE — 20611 DRAIN/INJ JOINT/BURSA W/US: CPT | Mod: 50,PBBFAC | Performed by: STUDENT IN AN ORGANIZED HEALTH CARE EDUCATION/TRAINING PROGRAM

## 2024-10-29 PROCEDURE — 73564 X-RAY EXAM KNEE 4 OR MORE: CPT | Mod: TC,RT

## 2024-10-29 PROCEDURE — 73564 X-RAY EXAM KNEE 4 OR MORE: CPT | Mod: TC,LT

## 2024-10-29 RX ORDER — LIDOCAINE HYDROCHLORIDE 10 MG/ML
5 INJECTION, SOLUTION EPIDURAL; INFILTRATION; INTRACAUDAL; PERINEURAL
Status: COMPLETED | OUTPATIENT
Start: 2024-10-29 | End: 2024-10-29

## 2024-10-29 RX ORDER — TRIAMCINOLONE ACETONIDE 40 MG/ML
40 INJECTION, SUSPENSION INTRA-ARTICULAR; INTRAMUSCULAR ONCE
Status: COMPLETED | OUTPATIENT
Start: 2024-10-29 | End: 2024-10-29

## 2024-10-29 RX ADMIN — TRIAMCINOLONE ACETONIDE 40 MG: 40 INJECTION, SUSPENSION INTRA-ARTICULAR; INTRAMUSCULAR at 10:10

## 2024-10-29 RX ADMIN — LIDOCAINE HYDROCHLORIDE 50 MG: 10 INJECTION, SOLUTION EPIDURAL; INFILTRATION; INTRACAUDAL; PERINEURAL at 10:10

## 2024-11-04 ENCOUNTER — OFFICE VISIT (OUTPATIENT)
Dept: INTERNAL MEDICINE | Facility: CLINIC | Age: 59
End: 2024-11-04
Payer: MEDICAID

## 2024-11-04 VITALS
RESPIRATION RATE: 16 BRPM | HEIGHT: 70 IN | HEART RATE: 97 BPM | BODY MASS INDEX: 29.2 KG/M2 | WEIGHT: 204 LBS | TEMPERATURE: 99 F

## 2024-11-04 DIAGNOSIS — A63.0 CONDYLOMA ACUMINATUM OF PENIS: ICD-10-CM

## 2024-11-04 DIAGNOSIS — F51.01 PRIMARY INSOMNIA: Primary | ICD-10-CM

## 2024-11-04 DIAGNOSIS — Z23 IMMUNIZATION DUE: ICD-10-CM

## 2024-11-04 DIAGNOSIS — Z12.11 COLON CANCER SCREENING: ICD-10-CM

## 2024-11-04 DIAGNOSIS — Z91.09 ENVIRONMENTAL ALLERGIES: ICD-10-CM

## 2024-11-04 PROCEDURE — 4010F ACE/ARB THERAPY RXD/TAKEN: CPT | Mod: CPTII,,, | Performed by: NURSE PRACTITIONER

## 2024-11-04 PROCEDURE — 3008F BODY MASS INDEX DOCD: CPT | Mod: CPTII,,, | Performed by: NURSE PRACTITIONER

## 2024-11-04 PROCEDURE — 99214 OFFICE O/P EST MOD 30 MIN: CPT | Mod: S$PBB,,, | Performed by: NURSE PRACTITIONER

## 2024-11-04 PROCEDURE — 99215 OFFICE O/P EST HI 40 MIN: CPT | Mod: PBBFAC | Performed by: NURSE PRACTITIONER

## 2024-11-04 PROCEDURE — 90656 IIV3 VACC NO PRSV 0.5 ML IM: CPT | Mod: PBBFAC

## 2024-11-04 PROCEDURE — 1160F RVW MEDS BY RX/DR IN RCRD: CPT | Mod: CPTII,,, | Performed by: NURSE PRACTITIONER

## 2024-11-04 PROCEDURE — 1159F MED LIST DOCD IN RCRD: CPT | Mod: CPTII,,, | Performed by: NURSE PRACTITIONER

## 2024-11-04 PROCEDURE — 90471 IMMUNIZATION ADMIN: CPT | Mod: PBBFAC

## 2024-11-04 RX ORDER — HYDROXYZINE PAMOATE 50 MG/1
50 CAPSULE ORAL NIGHTLY
Qty: 30 CAPSULE | Refills: 2 | Status: SHIPPED | OUTPATIENT
Start: 2024-11-04 | End: 2025-02-02

## 2024-11-04 RX ORDER — PODOFILOX 5 MG/ML
SOLUTION TOPICAL
COMMUNITY
Start: 2024-05-20

## 2024-11-04 RX ORDER — LORATADINE 10 MG/1
10 TABLET ORAL DAILY PRN
Qty: 90 TABLET | Refills: 4 | Status: SHIPPED | OUTPATIENT
Start: 2024-11-04 | End: 2026-01-28

## 2024-11-04 RX ADMIN — INFLUENZA VIRUS VACCINE 0.5 ML: 15; 15; 15 SUSPENSION INTRAMUSCULAR at 10:11

## 2024-11-04 NOTE — ASSESSMENT & PLAN NOTE
Referral to  for evaluation - [facility]    Start RX vistaril 50 mg q hs  Keep 3 month f/u   Referral to sleep lab  Avoid caffeine, alcohol and stimulants.  Do not use illicit drugs.  Practice positive phrases and repeat throughout the day, yoga, lavender scents or Chamomile tea to promote relaxation.  Set healthy boundaries, avoid people and conversations that increase stress.  Power down electronic devices at least one hour prior to bedtime.  Keep room dark; use eye mask or relaxation sound machine to promote rest.  Sleep hygiene refers to actions that tend to improve and maintain good sleep:  Sleep as long as necessary to feel rested (usually seven to eight hours for adults) and then get out of bed  Maintain a regular sleep schedule, particularly a regular wake-up time in the morning  Avoid smoking or other nicotine intake, particularly during the evening  Avoid daytime naps, especially if they are longer than 20 to 30 minutes or occur late in the day.

## 2024-11-04 NOTE — ASSESSMENT & PLAN NOTE
Referral to [facility]  [facility]

## 2024-11-04 NOTE — PROGRESS NOTES
Ginny Chery, GENTRY   [facility]  [address] [facility] [address]      PATIENT NAME: Xavier Harmon  : 1965  DATE: 24  MRN: [id number]      Reason for Visit / Chief Complaint: Joint Swelling (Taking shots from ortho clinic)       History of Present Illness / Problem Focused Workflow     Xavier Harmon presents to the clinic with Joint Swelling (Taking shots from ortho clinic)     57 yo male here today for f/u. Medical problems includes HTN, HCV, and Tobacco use (1/2 ppd), 30 ppy hx). Followed by [facility].      Prostate Cancer Screening - 10/05/23 PSA 0.57  Colon Cancer Screening - 10/10/23 FIT Negative   Osteoporosis Screening - 10/05/23 Vitamin D level 43.4  STI Screening - [facility]   Lung Cancer Screening - 10/03/2023 Ordered  Wellness - 10/03/2023     10/03/2023  Pt here today for routine wellness f/u with labs completed; reviewed and discussed with no questions or concerns. Meds reviewed and refilled appropriately. BP elevated today, pt reports he has been out of his meds, no med refill request / pt calls noted on chart, will refill meds today. Pt reports that it is difficult for him to make it here for both Primary Care Services and also for ID clinic. Pt lives in [facility] [place], will send referral today to [facility] for PCP services, clinic number given to pt today as well. Will drop off FIT test when he comes. Denies any acute issues today.     24  Pt here today for f/u appt. BP at goal today, reports compliance with meds. Meds reviewed and refilled appropriately.   Will f/u in 6 months for routine wellness OV with labs and prn. Denies any other issues today. Agreeable to PCV 20 today.   Bilateral Knee Pain , agreeable to knee x-rays today, will f/u  with results and pend referring to Ortho clinic. Pt reports Voltaren gel works well, will refill today, but oral diclofenac does not help. Will change  to Meloxicam daily prn today. Reports issues with sleep for years; reports trouble staying asleep and falling asleep; reports probably getting about 4 hours of sleep per night, does not nap during the day time as he usually works 12 hour shifts. Has recently started taking Melatonin, unsure of efficacy yet. Pt reports that he snores and also gasps for air and feels like he can't breath at times. ESS of 16. Will send referral to Sleep lab today for eval. Pt also reports with daily allergies, runny nose and congestion, is not currently taking any allergy meds,will send loratadine for daily use and short dose prednisone.Denies any other issues today.     [place] Sleepiness Scale  Sitting and reading: High chance of dozing  Watching TV: High chance of dozing  Sitting, inactive in a public place (e.g. a theatre or a meeting): Slight chance of dozing  As a passenger in a car for an hour without a break: High chance of dozing  Lying down to rest in the afternoon when circumstances permit: Moderate chance of dozing  Sitting and talking to someone: Would never doze  Sitting quietly after a lunch without alcohol: High chance of dozing  In a car, while stopped for a few minutes in traffic: Slight chance of dozing  Total score: 16    11/04/2024  Pt here today for in between OV to discuss staying out of work due to unable to sleep. The pt reports that only xanax works for sleep that he gets from a friend. Discussed with pt unable to treat for sleep with xanax. Pt reported that he has tried OTC medications including melatonin and those do not work for him. The pt is agreeable to referral to  for evaluation for sleep medication, will refer to [facility]. 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 Also hx of bumps on penis, the pt was treated twice by ID clinic  and reports the areas are still unresolved, will send refer all today [facility]. Pt agreeable to Flu shot today and would like referral to Dr. Cleaning for colonoscopy for evaluation. Pt to keep f/u appointment next year for routine eval. Pt also to keep f/u appt with [facility] as directed as well.             Review of Systems     Review of Systems   Constitutional: Negative.    HENT: Negative.     Eyes: Negative.    Respiratory: Negative.     Cardiovascular: Negative.    Gastrointestinal: Negative.    Endocrine: Negative.    Genitourinary: Negative.    Neurological: Negative.    Psychiatric/Behavioral:  Positive for sleep disturbance.          Medications and Allergies     Medications  Medication List with Changes/Refills   New Medications    HYDROXYZINE PAMOATE (VISTARIL) 50 MG CAP    Take 1 capsule (50 mg total) by mouth every evening. For Sleep as needed.    LORATADINE (CLARITIN) 10 MG TABLET    Take 1 tablet (10 mg total) by mouth daily as needed for Allergies.   Current Medications    AMLODIPINE (NORVASC) 10 MG TABLET    Take 1 tablet (10 mg total) by mouth once daily. For High Blood Pressure    LISINOPRIL (PRINIVIL,ZESTRIL) 40 MG TABLET    Take 1 tablet (40 mg total) by mouth once daily. For High Blood Pressure    MELOXICAM (MOBIC) 15 MG TABLET    Take 1 tablet (15 mg total) by mouth daily as needed (Knee Pain).    PODOFILOX (CONDYLOX) 0.5 % EXTERNAL SOLUTION    Apply topically.   Discontinued Medications    DICLOFENAC SODIUM (VOLTAREN) 1 % GEL    Apply 2 g topically 4 (four) times daily. To right knee.         Allergies  Review of patient's allergies indicates:  No Known Allergies    Physical Examination     Vitals:    11/04/24 0948   Pulse: 97   Resp: 16   Temp: 98.7 °F (37.1 °C)     Physical Exam  Vitals reviewed.   Constitutional:       Appearance: Normal appearance. He is normal weight.   HENT:      Head: Normocephalic.   Cardiovascular:      Rate and Rhythm: Normal rate and  regular rhythm.      Pulses: Normal pulses.      Heart sounds: Normal heart sounds.   Pulmonary:      Effort: Pulmonary effort is normal.      Breath sounds: Normal breath sounds.   Abdominal:      General: Abdomen is flat.      Palpations: Abdomen is soft.   Musculoskeletal:         General: Normal range of motion.      Cervical back: Normal range of motion.   Skin:     General: Skin is warm and dry.   Neurological:      Mental Status: He is alert.   Psychiatric:         Mood and Affect: Mood normal.           Results     Lab Results   Component Value Date    WBC 10.24 10/02/2023    RBC 7.25 (H) 10/02/2023    HGB 15.8 10/02/2023    HCT 52.0 10/02/2023    MCV 71.7 (L) 10/02/2023    MCH 21.8 (L) 10/02/2023    MCHC 30.4 (L) 10/02/2023    RDW 19.6 (H) 10/02/2023     10/02/2023    MPV 10.0 10/02/2023     Sodium   Date Value Ref Range Status   10/02/2023 143 136 - 145 mmol/L Final     Potassium   Date Value Ref Range Status   10/02/2023 4.4 3.5 - 5.1 mmol/L Final     Chloride   Date Value Ref Range Status   10/02/2023 101 98 - 107 mmol/L Final     CO2   Date Value Ref Range Status   10/02/2023 31 (H) 22 - 29 mmol/L Final     Blood Urea Nitrogen   Date Value Ref Range Status   10/02/2023 11.6 8.4 - 25.7 mg/dL Final     Creatinine   Date Value Ref Range Status   10/02/2023 1.19 (H) 0.73 - 1.18 mg/dL Final     Calcium   Date Value Ref Range Status   10/02/2023 9.2 8.4 - 10.2 mg/dL Final     Albumin   Date Value Ref Range Status   10/02/2023 3.5 3.5 - 5.0 g/dL Final     Bilirubin Total   Date Value Ref Range Status   10/02/2023 0.7 <=1.5 mg/dL Final     ALP   Date Value Ref Range Status   10/02/2023 82 40 - 150 unit/L Final     AST   Date Value Ref Range Status   10/02/2023 33 5 - 34 unit/L Final     ALT   Date Value Ref Range Status   10/02/2023 28 0 - 55 unit/L Final     Estimated GFR-Non    Date Value Ref Range Status   07/08/2018 >60 mL/min/1.73 m2 Final     Lab Results   Component Value Date     CHOL 212 (H) 10/02/2023     Lab Results   Component Value Date    HDL 85 (H) 10/02/2023     Lab Results   Component Value Date    TRIG 107 10/02/2023     Lab Results   Component Value Date    VLDL 21 10/02/2023     Lab Results   Component Value Date    .00 10/02/2023     Lab Results   Component Value Date    TSH 1.472 10/02/2023     Lab Results   Component Value Date    PHUR 7.5 10/02/2023    PROTEINUA 30 (A) 10/02/2023    GLUCUA Negative 10/02/2023    KETONESU Trace (A) 01/22/2021    OCCULTUA Trace-Intact (A) 10/02/2023    NITRITE Negative 10/02/2023    LEUKOCYTESUR Negative 10/02/2023     Lab Results   Component Value Date    HGBA1C 5.7 10/02/2023    HGBA1C 6.1 08/16/2022           Assessment         ICD-10-CM ICD-9-CM   1. Primary insomnia  F51.01 307.42   2. Condyloma acuminatum of penis  A63.0 078.11   3. Immunization due  Z23 V05.9   4. Colon cancer screening  Z12.11 V76.51   5. Environmental allergies  Z91.09 V15.09       Plan      Problem List Items Addressed This Visit          ID    Condyloma acuminatum of penis    Current Assessment & Plan     Referral to Family Med - [facility]  UCC Precautions          Relevant Orders    Ambulatory referral/consult to Family Practice       Other    Primary insomnia - Primary    Current Assessment & Plan     Referral to  for evaluation - [facility]    Start RX vistaril 50 mg q hs  Keep 3 month f/u   Referral to sleep lab  Avoid caffeine, alcohol and stimulants.  Do not use illicit drugs.  Practice positive phrases and repeat throughout the day, yoga, lavender scents or Chamomile tea to promote relaxation.  Set healthy boundaries, avoid people and conversations that increase stress.  Power down electronic devices at least one hour prior to bedtime.  Keep room dark; use eye mask or relaxation sound machine to promote rest.  Sleep hygiene refers to actions that tend to improve and maintain good sleep:  Sleep as long as necessary to feel rested (usually  seven to eight hours for adults) and then get out of bed  Maintain a regular sleep schedule, particularly a regular wake-up time in the morning  Avoid smoking or other nicotine intake, particularly during the evening  Avoid daytime naps, especially if they are longer than 20 to 30 minutes or occur late in the day.           Relevant Medications    hydrOXYzine pamoate (VISTARIL) 50 MG Cap    Other Relevant Orders    Ambulatory referral/consult to Psychiatry    Environmental allergies    Current Assessment & Plan     Start RX loratadine 10 mg daily prn  OTC antihistamines as needed (i.e. Benadryl)  Increase PO Fluids  Avoid/limit triggers such as pollen, dust, molds, and pet dander.   Avoid smoke, strong chemicals, cleaning products, perfumes/colognes.           Relevant Medications    loratadine (CLARITIN) 10 mg tablet     Other Visit Diagnoses       Immunization due        Relevant Medications    influenza (Flulaval, Fluzone, Fluarix) 45 mcg/0.5 mL IM vaccine (> or = 6 mo) 0.5 mL (Completed)    Colon cancer screening        Relevant Orders    Ambulatory referral/consult to Gastroenterology            Future Appointments   Date Time Provider Department Center   1/2/2025  1:40 PM Ginny Chery FNP [facility]            Signature:     [facility]  [address] [facility] [address]    Date of encounter: 11/4/24

## 2024-11-04 NOTE — ASSESSMENT & PLAN NOTE
Start RX loratadine 10 mg daily prn  OTC antihistamines as needed (i.e. Benadryl)  Increase PO Fluids  Avoid/limit triggers such as pollen, dust, molds, and pet dander.   Avoid smoke, strong chemicals, cleaning products, perfumes/colognes.

## 2024-12-17 NOTE — PROGRESS NOTES
Subjective:    Patient ID: Xavier Harmon is a 59 y.o. male  who presented to [facility] for follow up.      Chief Complaint: Pain and Swelling of the Right Knee and Pain and Swelling of the Left Knee      History of Present Illness:  Xavier Harmon who has a history of severe bilateral osteoarthritis  presented today with knee pain and swelling involving both knees for the past month. Pain is located anteriorly. Quality of pain is described as Dull, Aching, and Throbbing.  Inciting event:  Spending a lot of time working, climbing, standing, using stairs . Pain is aggravated by any weight bearing, going up and down stairs, standing, and walking.  Patient has had prior knee problems. Evaluation to date: plain films and Ortho evaluation. Treatment to date: topical analgesics, oral analgesics, and CSI. Expectations for today's visit includes improvement in his pain.    Patient other concern today is that he did have some relief after his last corticosteroid injection.  It only lasts for about 1 month.  Patient does throughout his time working standing, walking, climbing stairs when she does aggravate his knees.  He has tried some Aspercreme and Tylenol without any significant relief.  He is currently not on a NSAID.  He is worried that he is unable to work due to his pain in his knees.      Knee Review of Systems:  Swelling?  yes  Instability?  no  Mechanical sx?  no  <30 min AM stiffness? yes  Limited ROM? yes  Fever/Chills? no    Comorbid Conditions         Objective:      Physical Exam:    BP (!) 160/91   Pulse 98   SpO2 100%     Ortho/SPM Exam    Appearance:  antalgic  FWB  Alignment: Left: mild valgus Right: mild valgus   Soft tissue swelling: Left: yes Right: no  Effusion: Left:  Positive Right: Positive  Erythema: Left no Right: no  Ecchymosis: Left: no Right: no  Atrophy: Left: no Right: no    Palpation:  Knee Tenderness: Left: anterior knee joint Right:  anterior knee joint    Range of motion:  Flexion (140): Left:  110 Right: 110  Extension (0): Left: 10 Right: 10    Strength:  Extension: Left 4/5  Pain: yes     Right 4/5 Pain: yes  Flexion: Left 4/5 Pain: yes Right   4/5 Pain: yes        Special Tests:  Ballotable Effusion:Left: Positive Right: Negative   Fluid Wave: Left: Negative Right: Negative   Crepitus: Left: Negative Right: Negative   Patellar grind test: Left: Negative  Right: Negative  Apprehension test: Left: Negative Right: Negative   Varus: @ 0, Left Negative Right: Negative.  @ 30, Left Negative  Right Negative   Valgus: @ 0, Left Negative Right: Negative.  @ 30, Left Negative  Right Negative  Lachman: Left: Negative Right: Negative   Ant Drawer: Left: Negative Right: Negative   Posterior Drawer: Left: Negative Right: Negative   Dial Test: Left: Not performed Right: Not performed   Xochilt: Left: Not performed Right: Not performed   Apley's: Left: Not performed Right: Not performed  Thessaly's: Left: Not performed Right: Not performed   Noble Compression: Left: Not performed Right: Not performed   Sonny: Left: Not performed Right: Not performed       General appearance: NAD  Peripheral pulses: normal bilaterally   Reflexes: Left: normal Right normal   Sensation: normal    Labs:  Last A1c: 5.7     Imaging:   Previous images reviewed.  X-rays ordered and performed today: no      Assessment:        Encounter Diagnoses   Code Name Primary?    M17.0 Primary osteoarthritis of both knees Yes        Plan:    Dx: bilateral Knee Osteoarthritis, moderate exacerbation  Treatment Plan: : Discussed with patient diagnosis and treatment recommendations. Education provided. Recommend conservative treatment to include: avoidance of aggravating activity, significant modification of daily activities, hot/cold therapies, topical and oral medications, braces, HEP/PT/OT, and injections.  Patient would exacerbation of his osteoarthritis bilaterally.  We will start patient on  oral NSAIDs, topical NSAIDs, Tylenol to help him with his pain and swelling.  Counseled patient is the importance of icing his knees to help reduce swelling and pain.  Counseled patient that we will focus on the importance of a home exercise program and formal physical therapy to strengthen his knees and reduce his pain.  We will see patient back in about 2 months for re-evaluation at that time.  Imaging: prior radiological studies independently reviewed; discussed with patient; agree with radiologist interpretation.     Activity: Activity as tolerated; HEP to include aerobic conditioning and strength training with non-painful activity. ROM/STG exercises. Proper footware; assistive devises to avoid limping.   Therapy: Physical Therapy  Medication: START over-the-counter acetaminophen (Tylenol 1000 mg three times per day as needed)  START Voltaren Gel 1% as prescribed to affected area  START meloxicam (Mobic 15 mg daily). Please see your primary care physician for further refills.  RTC: 2 months.         Procedures    Earl Gonzales D.O.  Sports Medicine Fellow

## 2024-12-18 ENCOUNTER — OFFICE VISIT (OUTPATIENT)
Dept: ORTHOPEDICS | Facility: CLINIC | Age: 59
End: 2024-12-18
Payer: MEDICAID

## 2024-12-18 VITALS — HEART RATE: 98 BPM | OXYGEN SATURATION: 100 % | SYSTOLIC BLOOD PRESSURE: 160 MMHG | DIASTOLIC BLOOD PRESSURE: 91 MMHG

## 2024-12-18 DIAGNOSIS — M17.0 PRIMARY OSTEOARTHRITIS OF BOTH KNEES: Primary | ICD-10-CM

## 2024-12-18 PROCEDURE — 99213 OFFICE O/P EST LOW 20 MIN: CPT | Mod: PBBFAC | Performed by: STUDENT IN AN ORGANIZED HEALTH CARE EDUCATION/TRAINING PROGRAM

## 2024-12-18 RX ORDER — DICLOFENAC SODIUM 10 MG/G
2 GEL TOPICAL 4 TIMES DAILY
Qty: 100 G | Refills: 3 | Status: SHIPPED | OUTPATIENT
Start: 2024-12-18

## 2024-12-18 RX ORDER — DICLOFENAC SODIUM 75 MG/1
75 TABLET, DELAYED RELEASE ORAL 2 TIMES DAILY PRN
Qty: 30 TABLET | Refills: 1 | Status: SHIPPED | OUTPATIENT
Start: 2024-12-18 | End: 2025-01-17

## 2024-12-19 NOTE — PROGRESS NOTES
Faculty Attestation: Xavier Harmon  was seen in Sports Medicine Clinic Discussed with Dr. Gonzales at the time of the visit. History of Present Illness, Physical Exam, and Assessment and Plan reviewed.     Treatment plan is reasonable and appropriate. Compliance with treatment recommendations is important.      Radiology images independently reviewed and agree with fellow interpretation.     No procedure was performed.    Otoniel Osuna MD  Sports Medicine

## 2024-12-31 ENCOUNTER — LAB VISIT (OUTPATIENT)
Dept: LAB | Facility: HOSPITAL | Age: 59
End: 2024-12-31
Attending: NURSE PRACTITIONER
Payer: MEDICAID

## 2024-12-31 DIAGNOSIS — Z00.00 WELLNESS EXAMINATION: ICD-10-CM

## 2024-12-31 LAB
25(OH)D3+25(OH)D2 SERPL-MCNC: 17 NG/ML (ref 30–80)
ALBUMIN SERPL-MCNC: 3.6 G/DL (ref 3.4–4.8)
ALBUMIN/GLOB SERPL: 1 RATIO (ref 1.1–2)
ALP SERPL-CCNC: 74 UNIT/L (ref 40–150)
ALT SERPL-CCNC: 13 UNIT/L (ref 0–55)
ANION GAP SERPL CALC-SCNC: 8 MEQ/L
AST SERPL-CCNC: 14 UNIT/L (ref 5–34)
BACTERIA #/AREA URNS AUTO: ABNORMAL /HPF
BASOPHILS # BLD AUTO: 0.04 X10(3)/MCL
BASOPHILS NFR BLD AUTO: 0.5 %
BILIRUB SERPL-MCNC: 0.6 MG/DL
BILIRUB UR QL STRIP.AUTO: ABNORMAL
BUN SERPL-MCNC: 14.5 MG/DL (ref 8.4–25.7)
CALCIUM SERPL-MCNC: 9.2 MG/DL (ref 8.8–10)
CHLORIDE SERPL-SCNC: 108 MMOL/L (ref 98–107)
CHOLEST SERPL-MCNC: 197 MG/DL
CHOLEST/HDLC SERPL: 4 {RATIO} (ref 0–5)
CLARITY UR: CLEAR
CO2 SERPL-SCNC: 26 MMOL/L (ref 23–31)
COLOR UR AUTO: YELLOW
CREAT SERPL-MCNC: 0.79 MG/DL (ref 0.72–1.25)
CREAT/UREA NIT SERPL: 18
EOSINOPHIL # BLD AUTO: 0.18 X10(3)/MCL (ref 0–0.9)
EOSINOPHIL NFR BLD AUTO: 2.4 %
ERYTHROCYTE [DISTWIDTH] IN BLOOD BY AUTOMATED COUNT: 17.2 % (ref 11.5–17)
EST. AVERAGE GLUCOSE BLD GHB EST-MCNC: 131.2 MG/DL
GFR SERPLBLD CREATININE-BSD FMLA CKD-EPI: >60 ML/MIN/1.73/M2
GLOBULIN SER-MCNC: 3.7 GM/DL (ref 2.4–3.5)
GLUCOSE SERPL-MCNC: 114 MG/DL (ref 82–115)
GLUCOSE UR QL STRIP: NEGATIVE
HBA1C MFR BLD: 6.2 %
HCT VFR BLD AUTO: 44.8 % (ref 42–52)
HDLC SERPL-MCNC: 47 MG/DL (ref 35–60)
HGB BLD-MCNC: 14.3 G/DL (ref 14–18)
HGB UR QL STRIP: ABNORMAL
IMM GRANULOCYTES # BLD AUTO: 0 X10(3)/MCL (ref 0–0.04)
IMM GRANULOCYTES NFR BLD AUTO: 0 %
KETONES UR QL STRIP: NEGATIVE
LDLC SERPL CALC-MCNC: 134 MG/DL (ref 50–140)
LEUKOCYTE ESTERASE UR QL STRIP: NEGATIVE
LYMPHOCYTES # BLD AUTO: 2.44 X10(3)/MCL (ref 0.6–4.6)
LYMPHOCYTES NFR BLD AUTO: 32.8 %
MCH RBC QN AUTO: 22.2 PG (ref 27–31)
MCHC RBC AUTO-ENTMCNC: 31.9 G/DL (ref 33–36)
MCV RBC AUTO: 69.7 FL (ref 80–94)
MONOCYTES # BLD AUTO: 0.49 X10(3)/MCL (ref 0.1–1.3)
MONOCYTES NFR BLD AUTO: 6.6 %
MUCOUS THREADS URNS QL MICRO: ABNORMAL /LPF
NEUTROPHILS # BLD AUTO: 4.29 X10(3)/MCL (ref 2.1–9.2)
NEUTROPHILS NFR BLD AUTO: 57.7 %
NITRITE UR QL STRIP: NEGATIVE
PH UR STRIP: 6 [PH]
PLATELET # BLD AUTO: 223 X10(3)/MCL (ref 130–400)
PMV BLD AUTO: 9.5 FL (ref 7.4–10.4)
POTASSIUM SERPL-SCNC: 3.8 MMOL/L (ref 3.5–5.1)
PROT SERPL-MCNC: 7.3 GM/DL (ref 5.8–7.6)
PROT UR QL STRIP: 30
PSA SERPL-MCNC: 0.65 NG/ML
RBC # BLD AUTO: 6.43 X10(6)/MCL (ref 4.7–6.1)
RBC #/AREA URNS AUTO: ABNORMAL /HPF
SODIUM SERPL-SCNC: 142 MMOL/L (ref 136–145)
SP GR UR STRIP.AUTO: >=1.03 (ref 1–1.03)
SQUAMOUS #/AREA URNS AUTO: ABNORMAL /HPF
TRIGL SERPL-MCNC: 78 MG/DL (ref 34–140)
TSH SERPL-ACNC: 1.24 UIU/ML (ref 0.35–4.94)
UROBILINOGEN UR STRIP-ACNC: 1
VLDLC SERPL CALC-MCNC: 16 MG/DL
WBC # BLD AUTO: 7.44 X10(3)/MCL (ref 4.5–11.5)
WBC #/AREA URNS AUTO: ABNORMAL /HPF

## 2024-12-31 PROCEDURE — 80053 COMPREHEN METABOLIC PANEL: CPT

## 2024-12-31 PROCEDURE — 84443 ASSAY THYROID STIM HORMONE: CPT

## 2024-12-31 PROCEDURE — 36415 COLL VENOUS BLD VENIPUNCTURE: CPT

## 2024-12-31 PROCEDURE — 83036 HEMOGLOBIN GLYCOSYLATED A1C: CPT

## 2024-12-31 PROCEDURE — 82306 VITAMIN D 25 HYDROXY: CPT

## 2024-12-31 PROCEDURE — 85025 COMPLETE CBC W/AUTO DIFF WBC: CPT

## 2024-12-31 PROCEDURE — 84153 ASSAY OF PSA TOTAL: CPT

## 2024-12-31 PROCEDURE — 81003 URINALYSIS AUTO W/O SCOPE: CPT

## 2024-12-31 PROCEDURE — 80061 LIPID PANEL: CPT

## 2025-01-02 ENCOUNTER — OFFICE VISIT (OUTPATIENT)
Dept: INTERNAL MEDICINE | Facility: CLINIC | Age: 60
End: 2025-01-02
Payer: MEDICAID

## 2025-01-02 VITALS
SYSTOLIC BLOOD PRESSURE: 168 MMHG | WEIGHT: 201.69 LBS | DIASTOLIC BLOOD PRESSURE: 92 MMHG | OXYGEN SATURATION: 98 % | BODY MASS INDEX: 28.87 KG/M2 | HEART RATE: 97 BPM | TEMPERATURE: 98 F | HEIGHT: 70 IN | RESPIRATION RATE: 18 BRPM

## 2025-01-02 DIAGNOSIS — F51.01 PRIMARY INSOMNIA: ICD-10-CM

## 2025-01-02 DIAGNOSIS — I10 PRIMARY HYPERTENSION: ICD-10-CM

## 2025-01-02 DIAGNOSIS — M25.562 CHRONIC PAIN OF BOTH KNEES: ICD-10-CM

## 2025-01-02 DIAGNOSIS — R73.03 PREDIABETES: ICD-10-CM

## 2025-01-02 DIAGNOSIS — E55.9 VITAMIN D DEFICIENCY: ICD-10-CM

## 2025-01-02 DIAGNOSIS — Z00.00 WELLNESS EXAMINATION: Primary | ICD-10-CM

## 2025-01-02 DIAGNOSIS — G89.29 CHRONIC PAIN OF BOTH KNEES: ICD-10-CM

## 2025-01-02 DIAGNOSIS — M25.561 CHRONIC PAIN OF BOTH KNEES: ICD-10-CM

## 2025-01-02 PROCEDURE — 99214 OFFICE O/P EST MOD 30 MIN: CPT | Mod: PBBFAC | Performed by: NURSE PRACTITIONER

## 2025-01-02 RX ORDER — ERGOCALCIFEROL 1.25 MG/1
50000 CAPSULE ORAL
Qty: 8 CAPSULE | Refills: 0 | Status: SHIPPED | OUTPATIENT
Start: 2025-01-02 | End: 2025-02-21

## 2025-01-02 RX ORDER — DICLOFENAC SODIUM 10 MG/G
2 GEL TOPICAL 4 TIMES DAILY
Qty: 100 G | Refills: 3 | Status: SHIPPED | OUTPATIENT
Start: 2025-01-02

## 2025-01-02 RX ORDER — HYDROXYZINE PAMOATE 50 MG/1
50 CAPSULE ORAL NIGHTLY
Qty: 90 CAPSULE | Refills: 2 | Status: SHIPPED | OUTPATIENT
Start: 2025-01-02 | End: 2025-09-29

## 2025-01-02 RX ORDER — AMLODIPINE BESYLATE 10 MG/1
10 TABLET ORAL DAILY
Qty: 90 TABLET | Refills: 0 | Status: SHIPPED | OUTPATIENT
Start: 2025-01-02 | End: 2025-04-02

## 2025-01-02 RX ORDER — LISINOPRIL 40 MG/1
40 TABLET ORAL DAILY
Qty: 90 TABLET | Refills: 0 | Status: SHIPPED | OUTPATIENT
Start: 2025-01-02 | End: 2025-04-02

## 2025-01-02 RX ORDER — MELOXICAM 15 MG/1
15 TABLET ORAL DAILY PRN
Qty: 90 TABLET | Refills: 2 | Status: SHIPPED | OUTPATIENT
Start: 2025-01-02

## 2025-01-02 NOTE — ASSESSMENT & PLAN NOTE
Referral to  for evaluation - [facility]    Continue RX vistaril 50 mg q hs  Referral to sleep lab  Avoid caffeine, alcohol and stimulants.  Do not use illicit drugs.  Practice positive phrases and repeat throughout the day, yoga, lavender scents or Chamomile tea to promote relaxation.  Set healthy boundaries, avoid people and conversations that increase stress.  Power down electronic devices at least one hour prior to bedtime.  Keep room dark; use eye mask or relaxation sound machine to promote rest.  Sleep hygiene refers to actions that tend to improve and maintain good sleep:  Sleep as long as necessary to feel rested (usually seven to eight hours for adults) and then get out of bed  Maintain a regular sleep schedule, particularly a regular wake-up time in the morning  Avoid smoking or other nicotine intake, particularly during the evening  Avoid daytime naps, especially if they are longer than 20 to 30 minutes or occur late in the day.

## 2025-01-02 NOTE — ASSESSMENT & PLAN NOTE
BP at goal   Continue RX lisinopril 40 mg and amlodipine 10 mg daily   2 week f/u for BP check   BP: (!) 168/92  Low Sodium Diet (Dash Diet - less than 2 grams of sodium per day).  Maintain healthy weight with goal BMI <30. Exercise 30 minutes per day 5 days per week.

## 2025-01-02 NOTE — PROGRESS NOTES
Ginny Chery, GENTRY   [facility]  [address] [address]      PATIENT NAME: Xavier Harmon  : 1965  DATE: 25  MRN: [id number]      Reason for Visit / Chief Complaint: Follow-up       History of Present Illness / Problem Focused Workflow     Xavier Harmon presents to the clinic with Follow-up     59 yo male here today for f/u. Medical problems includes HTN, HCV, and Tobacco use (1/2 ppd), 30 ppy hx). Followed by [facility].      Prostate Cancer Screening - 10/05/23 PSA 0.57  Colon Cancer Screening - 10/10/23 FIT Negative   Osteoporosis Screening - 10/05/23 Vitamin D level 43.4  STI Screening - [facility]   Lung Cancer Screening - 10/03/2023 Ordered  Wellness -2025  Pt here today for in between OV to discuss staying out of work due to unable to sleep. The pt reports that only xanax works for sleep that he gets from a friend. Discussed with pt unable to treat for sleep with xanax. Pt reported that he has tried OTC medications including melatonin and those do not work for him. The pt is agreeable to referral to  for evaluation for sleep medication, will refer to [facility]. 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 Also hx of bumps on penis, the pt was treated twice by ID clinic and reports the areas are still unresolved, will send refer all [facility]. Pt agreeable to Flu shot today and would like referral to Dr. Cleaning for colonoscopy for evaluation. Pt to keep f/u appointment next year for routine eval. Pt also to keep f/u appt with [facility] as directed as well.     2025  Pt here today for yearly wellness OV with labs completed; reviewed and discussed with no questions or concerns at this  time. BP elevated today, pt reports was otu of the lisinopril, will go  today. Will f/u with pt in about 2 weeks for BP check.   Previous visit pt was referred to [facility], assistance with pt today to fill otu online submission, will f/u with appt.   Meds reviewed and refilled appropriately. Pt to complete PT from Ortho clinic for knees and f/u accordingly. Denies any other issues today.   Visit today included increased complexity associated with the care of the episodic problem HTN addressed and managing the longitudinal care of the patient due to the serious and/or complex managed problem(s) HTN.              Review of Systems     Review of Systems   Constitutional: Negative.    HENT: Negative.     Eyes: Negative.    Respiratory: Negative.     Cardiovascular: Negative.    Gastrointestinal: Negative.    Endocrine: Negative.    Genitourinary: Negative.    Musculoskeletal:  Positive for arthralgias.   Neurological: Negative.    Psychiatric/Behavioral: Negative.           Medications and Allergies     Medications  Medication List with Changes/Refills   New Medications    ERGOCALCIFEROL (ERGOCALCIFEROL) 50,000 UNIT CAP    Take 1 capsule (50,000 Units total) by mouth every 7 days. For Low Vitamin D. Start over the counter once completed. for 8 doses    MELOXICAM (MOBIC) 15 MG TABLET    Take 1 tablet (15 mg total) by mouth daily as needed (Knee Pain).   Current Medications    LORATADINE (CLARITIN) 10 MG TABLET    Take 1 tablet (10 mg total) by mouth daily as needed for Allergies.    PODOFILOX (CONDYLOX) 0.5 % EXTERNAL SOLUTION    Apply topically.   Changed and/or Refilled Medications    Modified Medication Previous Medication    AMLODIPINE (NORVASC) 10 MG TABLET amLODIPine (NORVASC) 10 MG tablet       Take 1 tablet (10 mg total) by mouth once daily. For High Blood Pressure    Take 1 tablet (10 mg total) by mouth once daily. For High Blood Pressure    DICLOFENAC SODIUM (VOLTAREN ARTHRITIS PAIN) 1 % GEL diclofenac sodium  (VOLTAREN ARTHRITIS PAIN) 1 % Gel       Apply 2 g topically 4 (four) times daily. Do not exceed 32 grams/day: do not to exceed 8 grams/day/single joint of upper extremities; do not to exceed 16 grams/day/single joint of lower extremities.    Apply 2 g topically 4 (four) times daily. Do not exceed 32 grams/day: do not to exceed 8 grams/day/single joint of upper extremities; do not to exceed 16 grams/day/single joint of lower extremities.  Please request refill of this medication from your PCP.    HYDROXYZINE PAMOATE (VISTARIL) 50 MG CAP hydrOXYzine pamoate (VISTARIL) 50 MG Cap       Take 1 capsule (50 mg total) by mouth every evening. For Sleep as needed.    Take 1 capsule (50 mg total) by mouth every evening. For Sleep as needed.    LISINOPRIL (PRINIVIL,ZESTRIL) 40 MG TABLET lisinopriL (PRINIVIL,ZESTRIL) 40 MG tablet       Take 1 tablet (40 mg total) by mouth once daily. For High Blood Pressure    Take 1 tablet (40 mg total) by mouth once daily. For High Blood Pressure   Discontinued Medications    DICLOFENAC (VOLTAREN) 75 MG EC TABLET    Take 1 tablet (75 mg total) by mouth 2 (two) times daily as needed. Please request refill of this medication from your PCP.         Allergies  Review of patient's allergies indicates:  No Known Allergies    Physical Examination     Vitals:    01/02/25 1342   BP: (!) 168/92   Pulse: 97   Resp: 18   Temp: 98.4 °F (36.9 °C)     Physical Exam  Vitals reviewed.   Constitutional:       Appearance: Normal appearance. He is obese.   HENT:      Head: Normocephalic.   Cardiovascular:      Rate and Rhythm: Normal rate and regular rhythm.      Pulses: Normal pulses.      Heart sounds: Normal heart sounds.   Pulmonary:      Effort: Pulmonary effort is normal.      Breath sounds: Normal breath sounds.   Abdominal:      General: Abdomen is flat.      Palpations: Abdomen is soft.   Musculoskeletal:         General: Normal range of motion.      Cervical back: Normal range of motion.   Skin:      General: Skin is warm and dry.   Neurological:      Mental Status: He is alert.   Psychiatric:         Mood and Affect: Mood normal.           Results     Lab Results   Component Value Date    WBC 7.44 12/31/2024    RBC 6.43 (H) 12/31/2024    HGB 14.3 12/31/2024    HCT 44.8 12/31/2024    MCV 69.7 (L) 12/31/2024    MCH 22.2 (L) 12/31/2024    MCHC 31.9 (L) 12/31/2024    RDW 17.2 (H) 12/31/2024     12/31/2024    MPV 9.5 12/31/2024     Sodium   Date Value Ref Range Status   12/31/2024 142 136 - 145 mmol/L Final     Potassium   Date Value Ref Range Status   12/31/2024 3.8 3.5 - 5.1 mmol/L Final     Chloride   Date Value Ref Range Status   12/31/2024 108 (H) 98 - 107 mmol/L Final     CO2   Date Value Ref Range Status   12/31/2024 26 23 - 31 mmol/L Final     Blood Urea Nitrogen   Date Value Ref Range Status   12/31/2024 14.5 8.4 - 25.7 mg/dL Final     Creatinine   Date Value Ref Range Status   12/31/2024 0.79 0.72 - 1.25 mg/dL Final     Calcium   Date Value Ref Range Status   12/31/2024 9.2 8.8 - 10.0 mg/dL Final     Albumin   Date Value Ref Range Status   12/31/2024 3.6 3.4 - 4.8 g/dL Final     Bilirubin Total   Date Value Ref Range Status   12/31/2024 0.6 <=1.5 mg/dL Final     ALP   Date Value Ref Range Status   12/31/2024 74 40 - 150 unit/L Final     AST   Date Value Ref Range Status   12/31/2024 14 5 - 34 unit/L Final     ALT   Date Value Ref Range Status   12/31/2024 13 0 - 55 unit/L Final     Estimated GFR-Non    Date Value Ref Range Status   07/08/2018 >60 mL/min/1.73 m2 Final     Lab Results   Component Value Date    CHOL 197 12/31/2024     Lab Results   Component Value Date    HDL 47 12/31/2024     Lab Results   Component Value Date    TRIG 78 12/31/2024     Lab Results   Component Value Date    VLDL 16 12/31/2024     Lab Results   Component Value Date    .00 12/31/2024     Lab Results   Component Value Date    TSH 1.235 12/31/2024     Lab Results   Component Value Date    PHUR 6.0  12/31/2024    PROTEINUA 30 (A) 12/31/2024    GLUCUA Negative 12/31/2024    KETONESU Trace (A) 01/22/2021    OCCULTUA Trace (A) 12/31/2024    NITRITE Negative 12/31/2024    LEUKOCYTESUR Negative 12/31/2024     Lab Results   Component Value Date    HGBA1C 6.2 12/31/2024    HGBA1C 5.7 10/02/2023    HGBA1C 6.1 08/16/2022           Assessment         ICD-10-CM ICD-9-CM   1. Wellness examination  Z00.00 V70.0   2. Prediabetes  R73.03 790.29   3. Primary hypertension  I10 401.9   4. Vitamin D deficiency  E55.9 268.9   5. Chronic pain of both knees  M25.561 719.46    M25.562 338.29    G89.29    6. Primary insomnia  F51.01 307.42       Plan      1. Wellness examination  Assessment & Plan:  Pt wellness visit completed today with appropriate lab work.   HM Topics Reviewed / Updated  Immunizations Discussed  Dicussed Healthy Diet &   Encouraged to exercise 3 x weekly  Increase Water Intake  Eat more fruits and vegetables  Avoid soda & alcohol        2. Prediabetes  Assessment & Plan:    Follow ADA diet.  Avoid soda, simple sweets, and limit rice/pasta/bread/starches and consume brown options when possible.   Maintain healthy weight with BMI goal <30.   Perform aerobic exercise for 150 minutes per week (or 5 days a week for 30 minutes each day).   Examine feet daily.     Lab Results   Component Value Date    HGBA1C 6.2 12/31/2024           3. Primary hypertension  Assessment & Plan:  BP at goal   Continue RX lisinopril 40 mg and amlodipine 10 mg daily   2 week f/u for BP check   BP: (!) 168/92  Low Sodium Diet (Dash Diet - less than 2 grams of sodium per day).  Maintain healthy weight with goal BMI <30. Exercise 30 minutes per day 5 days per week.      Orders:  -     amLODIPine (NORVASC) 10 MG tablet; Take 1 tablet (10 mg total) by mouth once daily. For High Blood Pressure  Dispense: 90 tablet; Refill: 0  -     lisinopriL (PRINIVIL,ZESTRIL) 40 MG tablet; Take 1 tablet (40 mg total) by mouth once daily. For High Blood Pressure   Dispense: 90 tablet; Refill: 0    4. Vitamin D deficiency  -     ergocalciferol (ERGOCALCIFEROL) 50,000 unit Cap; Take 1 capsule (50,000 Units total) by mouth every 7 days. For Low Vitamin D. Start over the counter once completed. for 8 doses  Dispense: 8 capsule; Refill: 0    5. Chronic pain of both knees  -     diclofenac sodium (VOLTAREN ARTHRITIS PAIN) 1 % Gel; Apply 2 g topically 4 (four) times daily. Do not exceed 32 grams/day: do not to exceed 8 grams/day/single joint of upper extremities; do not to exceed 16 grams/day/single joint of lower extremities.  Dispense: 100 g; Refill: 3  -     meloxicam (MOBIC) 15 MG tablet; Take 1 tablet (15 mg total) by mouth daily as needed (Knee Pain).  Dispense: 90 tablet; Refill: 2    6. Primary insomnia  Assessment & Plan:  Referral to  for evaluation - [facility]    Continue RX vistaril 50 mg q hs  Referral to sleep lab  Avoid caffeine, alcohol and stimulants.  Do not use illicit drugs.  Practice positive phrases and repeat throughout the day, yoga, lavender scents or Chamomile tea to promote relaxation.  Set healthy boundaries, avoid people and conversations that increase stress.  Power down electronic devices at least one hour prior to bedtime.  Keep room dark; use eye mask or relaxation sound machine to promote rest.  Sleep hygiene refers to actions that tend to improve and maintain good sleep:  Sleep as long as necessary to feel rested (usually seven to eight hours for adults) and then get out of bed  Maintain a regular sleep schedule, particularly a regular wake-up time in the morning  Avoid smoking or other nicotine intake, particularly during the evening  Avoid daytime naps, especially if they are longer than 20 to 30 minutes or occur late in the day.      Orders:  -     hydrOXYzine pamoate (VISTARIL) 50 MG Cap; Take 1 capsule (50 mg total) by mouth every evening. For Sleep as needed.  Dispense: 90 capsule; Refill: 2         Future Appointments   Date Time Provider  Department Center   1/13/2025 11:00 AM Ginny Chery FNP [facility] [place] [facility]   1/22/2025  8:00 AM Michele Vasques MD [facility] ORTHO [facility]   2/21/2025  8:00 AM SCHEDULE,  MINOR SURGERY [facility] RES [place] [facility]   2/25/2025  8:30 AM Earl Gonzales DO [facility]TH[facility] [place]            Signature:     [facility]  [address] [address]    Date of encounter: 1/2/25

## 2025-01-02 NOTE — ASSESSMENT & PLAN NOTE
Follow ADA diet.  Avoid soda, simple sweets, and limit rice/pasta/bread/starches and consume brown options when possible.   Maintain healthy weight with BMI goal <30.   Perform aerobic exercise for 150 minutes per week (or 5 days a week for 30 minutes each day).   Examine feet daily.     Lab Results   Component Value Date    HGBA1C 6.2 12/31/2024

## 2025-01-13 ENCOUNTER — OFFICE VISIT (OUTPATIENT)
Dept: INTERNAL MEDICINE | Facility: CLINIC | Age: 60
End: 2025-01-13
Payer: MEDICAID

## 2025-01-13 VITALS
DIASTOLIC BLOOD PRESSURE: 83 MMHG | BODY MASS INDEX: 29.39 KG/M2 | TEMPERATURE: 98 F | HEART RATE: 70 BPM | RESPIRATION RATE: 18 BRPM | HEIGHT: 70 IN | WEIGHT: 205.31 LBS | OXYGEN SATURATION: 100 % | SYSTOLIC BLOOD PRESSURE: 132 MMHG

## 2025-01-13 DIAGNOSIS — M25.562 CHRONIC PAIN OF BOTH KNEES: ICD-10-CM

## 2025-01-13 DIAGNOSIS — G89.29 CHRONIC PAIN OF BOTH KNEES: ICD-10-CM

## 2025-01-13 DIAGNOSIS — Z91.09 ENVIRONMENTAL ALLERGIES: ICD-10-CM

## 2025-01-13 DIAGNOSIS — M25.561 CHRONIC PAIN OF BOTH KNEES: ICD-10-CM

## 2025-01-13 DIAGNOSIS — I10 PRIMARY HYPERTENSION: ICD-10-CM

## 2025-01-13 PROCEDURE — 1159F MED LIST DOCD IN RCRD: CPT | Mod: CPTII,,, | Performed by: NURSE PRACTITIONER

## 2025-01-13 PROCEDURE — 4010F ACE/ARB THERAPY RXD/TAKEN: CPT | Mod: CPTII,,, | Performed by: NURSE PRACTITIONER

## 2025-01-13 PROCEDURE — 3075F SYST BP GE 130 - 139MM HG: CPT | Mod: CPTII,,, | Performed by: NURSE PRACTITIONER

## 2025-01-13 PROCEDURE — 99214 OFFICE O/P EST MOD 30 MIN: CPT | Mod: PBBFAC | Performed by: NURSE PRACTITIONER

## 2025-01-13 PROCEDURE — 3008F BODY MASS INDEX DOCD: CPT | Mod: CPTII,,, | Performed by: NURSE PRACTITIONER

## 2025-01-13 PROCEDURE — 99214 OFFICE O/P EST MOD 30 MIN: CPT | Mod: S$PBB,,, | Performed by: NURSE PRACTITIONER

## 2025-01-13 PROCEDURE — 1160F RVW MEDS BY RX/DR IN RCRD: CPT | Mod: CPTII,,, | Performed by: NURSE PRACTITIONER

## 2025-01-13 PROCEDURE — 3079F DIAST BP 80-89 MM HG: CPT | Mod: CPTII,,, | Performed by: NURSE PRACTITIONER

## 2025-01-13 RX ORDER — AMLODIPINE BESYLATE 10 MG/1
10 TABLET ORAL DAILY
Qty: 90 TABLET | Refills: 2 | Status: SHIPPED | OUTPATIENT
Start: 2025-01-13 | End: 2025-10-10

## 2025-01-13 RX ORDER — LORATADINE 10 MG/1
10 TABLET ORAL DAILY PRN
Qty: 90 TABLET | Refills: 4 | Status: SHIPPED | OUTPATIENT
Start: 2025-01-13 | End: 2026-04-08

## 2025-01-13 RX ORDER — LISINOPRIL 40 MG/1
40 TABLET ORAL DAILY
Qty: 90 TABLET | Refills: 2 | Status: SHIPPED | OUTPATIENT
Start: 2025-01-13 | End: 2025-10-10

## 2025-01-13 RX ORDER — DICLOFENAC SODIUM 10 MG/G
2 GEL TOPICAL 4 TIMES DAILY
Qty: 100 G | Refills: 3 | Status: SHIPPED | OUTPATIENT
Start: 2025-01-13

## 2025-01-13 NOTE — PROGRESS NOTES
GENTRY Westbrook   [facility]  [address] [facility] [address]      PATIENT NAME: Xavier Harmon  : 1965  DATE: 25  MRN: [id number]      Patient PCP Information       Provider PCP Type    GENTRY Westbrook [facility]            Reason for Visit / Chief Complaint: Follow-up and Hypertension (B/P CHECK)       History of Present Illness / Problem Focused Workflow     Xavier Harmon presents to the clinic with Follow-up and Hypertension (B/P CHECK)     57 yo male here today for f/u. Medical problems includes HTN, HCV, and Tobacco use (1/2 ppd), 30 ppy hx). Followed by [facility].      Prostate Cancer Screening - 10/05/23 PSA 0.57  Colon Cancer Screening - 10/10/23 FIT Negative   Osteoporosis Screening - 10/05/23 Vitamin D level 43.4  STI Screening - [facility]   Lung Cancer Screening - 10/03/2023 Ordered  Wellness -2025  Pt here today for in between OV to discuss staying out of work due to unable to sleep. The pt reports that only xanax works for sleep that he gets from a friend. Discussed with pt unable to treat for sleep with xanax. Pt reported that he has tried OTC medications including melatonin and those do not work for him. The pt is agreeable to referral to  for evaluation for sleep medication, will refer to [facility]. 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 Also hx of bumps on penis, the pt was treated twice by ID clinic and reports the areas are still unresolved, will send refer all today to [facility]. Pt agreeable to Flu shot today and would like referral to Dr. Cleaning for colonoscopy for evaluation. Pt to keep f/u appointment next year for routine eval. Pt also to keep f/u appt with [facility] as directed  as well.     01/02/2025  Pt here today for yearly wellness OV with labs completed; reviewed and discussed with no questions or concerns at this time. BP elevated today, pt reports was otu of the lisinopril, will go  today. Will f/u with pt in about 2 weeks for BP check.   Previous visit pt was referred to [facility], assistance with pt today to fill otu online submission, will f/u with appt.   Meds reviewed and refilled appropriately. Pt to complete PT from Ortho clinic for knees and f/u accordingly. Denies any other issues today.   Visit today included increased complexity associated with the care of the episodic problem HTN addressed and managing the longitudinal care of the patient due to the serious and/or complex managed problem(s) HTN.      01/13/2025  Pt here today for BP check; BP at goal today, pt reports compliance with  medications. Denies any acute issues today. Will f/u in about 6 months for BP check, no labs, and prn.     Hypertension          Review of Systems     Review of Systems   Constitutional: Negative.    HENT: Negative.     Eyes: Negative.    Respiratory: Negative.     Cardiovascular: Negative.    Gastrointestinal: Negative.    Endocrine: Negative.    Genitourinary: Negative.    Neurological: Negative.    Psychiatric/Behavioral: Negative.           Medications and Allergies     Medications  Current Outpatient Medications   Medication Instructions    amLODIPine (NORVASC) 10 mg, Oral, Daily, For High Blood Pressure    diclofenac sodium (VOLTAREN ARTHRITIS PAIN) 2 g, Topical (Top), 4 times daily, Do not exceed 32 grams/day: do not to exceed 8 grams/day/single joint of upper extremities; do not to exceed 16 grams/day/single joint of lower extremities.    ergocalciferol (ERGOCALCIFEROL) 50,000 Units, Oral, Every 7 days, For Low Vitamin D. Start over the counter once completed.    hydrOXYzine pamoate (VISTARIL) 50 mg, Oral, Nightly, For Sleep as needed.    lisinopriL (PRINIVIL,ZESTRIL) 40 mg, Oral,  "Daily, For High Blood Pressure    loratadine (CLARITIN) 10 mg, Oral, Daily PRN    meloxicam (MOBIC) 15 mg, Oral, Daily PRN    podofilox (CONDYLOX) 0.5 % external solution Apply topically.         Allergies  Review of patient's allergies indicates:  No Known Allergies    Physical Examination     Visit Vitals  /83 (BP Location: Left arm, Patient Position: Sitting)   Pulse 70   Temp 98.4 °F (36.9 °C) (Oral)   Resp 18   Ht 5' 10" (1.778 m)   Wt 93.1 kg (205 lb 4.8 oz)   SpO2 100%   BMI 29.46 kg/m²       Physical Exam  Vitals reviewed.   Constitutional:       Appearance: Normal appearance. He is obese.   HENT:      Head: Normocephalic.   Cardiovascular:      Rate and Rhythm: Normal rate and regular rhythm.      Pulses: Normal pulses.      Heart sounds: Normal heart sounds.   Pulmonary:      Effort: Pulmonary effort is normal.      Breath sounds: Normal breath sounds.   Abdominal:      General: Abdomen is flat.      Palpations: Abdomen is soft.   Musculoskeletal:         General: Normal range of motion.      Cervical back: Normal range of motion.   Skin:     General: Skin is warm and dry.   Neurological:      Mental Status: He is alert.   Psychiatric:         Mood and Affect: Mood normal.           Results     Lab Results   Component Value Date    WBC 7.44 12/31/2024    RBC 6.43 (H) 12/31/2024    HGB 14.3 12/31/2024    HCT 44.8 12/31/2024    MCV 69.7 (L) 12/31/2024    MCH 22.2 (L) 12/31/2024    MCHC 31.9 (L) 12/31/2024    RDW 17.2 (H) 12/31/2024     12/31/2024    MPV 9.5 12/31/2024     CMP  Sodium   Date Value Ref Range Status   12/31/2024 142 136 - 145 mmol/L Final     Potassium   Date Value Ref Range Status   12/31/2024 3.8 3.5 - 5.1 mmol/L Final     Chloride   Date Value Ref Range Status   12/31/2024 108 (H) 98 - 107 mmol/L Final     CO2   Date Value Ref Range Status   12/31/2024 26 23 - 31 mmol/L Final     Blood Urea Nitrogen   Date Value Ref Range Status   12/31/2024 14.5 8.4 - 25.7 mg/dL Final "     Creatinine   Date Value Ref Range Status   12/31/2024 0.79 0.72 - 1.25 mg/dL Final     Calcium   Date Value Ref Range Status   12/31/2024 9.2 8.8 - 10.0 mg/dL Final     Albumin   Date Value Ref Range Status   12/31/2024 3.6 3.4 - 4.8 g/dL Final     Bilirubin Total   Date Value Ref Range Status   12/31/2024 0.6 <=1.5 mg/dL Final     ALP   Date Value Ref Range Status   12/31/2024 74 40 - 150 unit/L Final     AST   Date Value Ref Range Status   12/31/2024 14 5 - 34 unit/L Final     ALT   Date Value Ref Range Status   12/31/2024 13 0 - 55 unit/L Final     Estimated GFR-Non    Date Value Ref Range Status   07/08/2018 >60 mL/min/1.73 m2 Final     Lab Results   Component Value Date    CHOL 197 12/31/2024     Lab Results   Component Value Date    HDL 47 12/31/2024     Lab Results   Component Value Date    TRIG 78 12/31/2024     Lab Results   Component Value Date    VLDL 16 12/31/2024     Lab Results   Component Value Date    .00 12/31/2024     Lab Results   Component Value Date    TSH 1.235 12/31/2024         Assessment        ICD-10-CM ICD-9-CM   1. Primary hypertension  I10 401.9   2. Chronic pain of both knees  M25.561 719.46    M25.562 338.29    G89.29    3. Environmental allergies  Z91.09 V15.09        Plan      1. Primary hypertension  Assessment & Plan:  BP at goal   Continue RX lisinopril 40 mg and amlodipine 10 mg daily   6 month f/u   BP: 132/83  Low Sodium Diet (Dash Diet - less than 2 grams of sodium per day).  Maintain healthy weight with goal BMI <30. Exercise 30 minutes per day 5 days per week.      Orders:  -     amLODIPine (NORVASC) 10 MG tablet; Take 1 tablet (10 mg total) by mouth once daily. For High Blood Pressure  Dispense: 90 tablet; Refill: 2  -     lisinopriL (PRINIVIL,ZESTRIL) 40 MG tablet; Take 1 tablet (40 mg total) by mouth once daily. For High Blood Pressure  Dispense: 90 tablet; Refill: 2    2. Chronic pain of both knees  -     diclofenac sodium (VOLTAREN ARTHRITIS  PAIN) 1 % Gel; Apply 2 g topically 4 (four) times daily. Do not exceed 32 grams/day: do not to exceed 8 grams/day/single joint of upper extremities; do not to exceed 16 grams/day/single joint of lower extremities.  Dispense: 100 g; Refill: 3    3. Environmental allergies  -     loratadine (CLARITIN) 10 mg tablet; Take 1 tablet (10 mg total) by mouth daily as needed for Allergies.  Dispense: 90 tablet; Refill: 4         Future Appointments   Date Time Provider Department Center   1/13/2025 11:00 [facility]   1/22/2025  8:00 AM Michele Vasques MD [facility]   2/21/2025  8:00 AM SCHEDULE,  MINOR SURGERY [facility]   2/25/2025  8:30 AM Earl Gonzales DO [facility]        Follow up in about 6 months (around 7/13/2025) for BP Check.      Signature:     [facility]  [address] [address]    Date of encounter: 1/13/25

## 2025-01-13 NOTE — ASSESSMENT & PLAN NOTE
BP at goal   Continue RX lisinopril 40 mg and amlodipine 10 mg daily   6 month f/u   BP: 132/83  Low Sodium Diet (Dash Diet - less than 2 grams of sodium per day).  Maintain healthy weight with goal BMI <30. Exercise 30 minutes per day 5 days per week.

## 2025-02-07 ENCOUNTER — TELEPHONE (OUTPATIENT)
Dept: INTERNAL MEDICINE | Facility: CLINIC | Age: 60
End: 2025-02-07
Payer: MEDICAID

## 2025-02-07 NOTE — TELEPHONE ENCOUNTER
----- Message from Sarah sent at 2/6/2025 10:19 AM CST -----  Regarding: Paperwork  Who Called: Xavier Harmon    Caller is requesting assistance/information from provider's office.    Symptoms (please be specific): knee pain/out of work   How long has patient had these symptoms:    List of preferred pharmacies on file (remove unneeded): [unfilled]  If different, enter pharmacy into here including location and phone number:       Preferred Method of Contact: Phone Call  Patient's Preferred Phone Number on File: [phone]   Best Call Back Number, if different:    Additional Information:  Dr. Gonzales refused to sign paperwork bc he didn't take him out of work.  He needs someone to sign paperwork bc he is not able to work right now bc of knee pain.

## 2025-02-07 NOTE — TELEPHONE ENCOUNTER
I contacted patient  and he has understanding that provider GENTRY Iraheta can not sign paperwork because she did not take him out of work. He has  sent paper work to orthopedic surgeon whom he is under his care. He has appt. With him later this month.

## 2025-02-17 NOTE — PROGRESS NOTES
"[facility] Minor Procedure Clinic Note    ID:  Xavier Harmon   MRN:  [id number]   2/21/2025    Chief Complaint:    Chief Complaint   Patient presents with    condyloma     History of Present Illness:  Xavier Harmon is a 59 y.o. male who presents to [facility] clinic for condyloma acuminatum. Has tried Condylox and Aldara with some response, but did not resolve lesions. Denies any itching, burning, bleeding/discharge.      Current Outpatient Medications   Medication Instructions    amLODIPine (NORVASC) 10 mg, Oral, Daily, For High Blood Pressure    diclofenac sodium (VOLTAREN ARTHRITIS PAIN) 2 g, Topical (Top), 4 times daily, Do not exceed 32 grams/day: do not to exceed 8 grams/day/single joint of upper extremities; do not to exceed 16 grams/day/single joint of lower extremities.    ergocalciferol (ERGOCALCIFEROL) 50,000 Units, Oral, Every 7 days, For Low Vitamin D. Start over the counter once completed.    hydrOXYzine pamoate (VISTARIL) 50 mg, Oral, Nightly, For Sleep as needed.    imiquimod (ALDARA) 5 % cream Topical (Top), Three times weekly    lisinopriL (PRINIVIL,ZESTRIL) 40 mg, Oral, Daily, For High Blood Pressure    loratadine (CLARITIN) 10 mg, Oral, Daily PRN    meloxicam (MOBIC) 15 mg, Oral, Daily PRN    podofilox (CONDYLOX) 0.5 % external solution Apply topically.     ROS per HPI    Objective:  Vitals:    02/21/25 0804   BP: 129/75   Patient Position: Sitting   Pulse: 73   Resp: 18   Temp: 98.6 °F (37 °C)   SpO2: 99%   Weight: 97.1 kg (214 lb)   Height: 5' 7.32" (1.71 m)       Physical Exam  Constitutional:       General: He is not in acute distress.  Musculoskeletal:         General: No swelling or tenderness.   Skin:     General: Skin is warm and dry.      Comments: Flesh colored papules along the shaft of the penis   Neurological:      Mental Status: He is alert.       Procedure: Cryotherapy   Location: Shaft of penis  Indication: relief of irritation/pain  Physicians: Dr. Yanet Landa  Attending: Dr. Rene " Laurence   Procedure, benefits, risks (including those of bleeding, infection, anesthesia & allergic reaction), and alternatives explained to the patient who voiced understanding of the information. Patient agreed to proceed with cryotherapy. Informed consent signed and on chart.  Time out preformed.  Description: Cryotip nozzle with liquid nitrogen held approximately 1 cm away from lesion. Liquid nitrogen was applied 5 seconds to the lesion. Lesion was allowed to thaw, before being reapplied for 5 seconds. No complications. Patient tolerated procedure well. Skin care precautions given.   Disposition: Patient tolerated procedure well with no complications. Patient instructed on wound care management.    Assessment/Plan:  1. Condyloma acuminatum of penis      Cryotherapy preformed, tolerated well  Provided routine post-cryo management instructions  Aldara sent to [facility] pharmacy   ED precautions given    Follow up in about 1 month (around 3/21/2025) for f/u cryo condyloma acuminatum.    Future Appointments   Date Time Provider Department Center   2/25/2025  8:30 AM Earl Gonzales DO [facility] ORTHO [place]    3/21/2025  8:00 AM SCHEDULE,  MINOR SURGERY [facility] FM RES [place]   7/14/2025  9:40 AM Ginny Chery, JOSEP [facility] INTMED [place] [facility]     Yanet Landa DO  [facility] HO-1

## 2025-02-21 ENCOUNTER — OFFICE VISIT (OUTPATIENT)
Dept: FAMILY MEDICINE | Facility: CLINIC | Age: 60
End: 2025-02-21
Payer: MEDICAID

## 2025-02-21 VITALS
SYSTOLIC BLOOD PRESSURE: 129 MMHG | RESPIRATION RATE: 18 BRPM | HEIGHT: 67 IN | WEIGHT: 214 LBS | HEART RATE: 73 BPM | BODY MASS INDEX: 33.59 KG/M2 | DIASTOLIC BLOOD PRESSURE: 75 MMHG | TEMPERATURE: 99 F | OXYGEN SATURATION: 99 %

## 2025-02-21 DIAGNOSIS — A63.0 CONDYLOMA ACUMINATUM OF PENIS: Primary | ICD-10-CM

## 2025-02-21 PROCEDURE — 99215 OFFICE O/P EST HI 40 MIN: CPT | Mod: PBBFAC

## 2025-02-21 RX ORDER — IMIQUIMOD 12.5 MG/.25G
CREAM TOPICAL
Qty: 12 PACKET | Refills: 2 | Status: SHIPPED | OUTPATIENT
Start: 2025-02-21

## 2025-02-21 NOTE — PROGRESS NOTES
I have seen the patient, reviewed the resident's history and physical, assessment, plan, and progress note. I have personally interviewed and examined the patient at bedside and: agree with the findings.     Edgard Dang MD  [facility]

## 2025-02-21 NOTE — PROGRESS NOTES
Split Night Study Complete   Bed: UREDH-C  Expected date:   Expected time:   Means of arrival:   Comments:  Hems 428, 25F, SI

## 2025-02-24 NOTE — PROGRESS NOTES
"Subjective:    Patient ID: Xavier Harmon is a 59 y.o. male  who presented to [facility] for follow up.      Chief Complaint: Pain of the Left Knee (2M Rancho Knee Pain) and Pain of the Right Knee      History of Present Illness:  Xavier Harmon  is a 59-year-old male who presents to clinic today for bilateral knee pain that has been going on for multiple months.  Since her last visit, patient reports that he has not started any physical therapy and has been taking medications but did not know the name of the medications he is taking.  He reports that he is taking all the medication as prescribed.  Today, he reports that he is having bilateral knee pain with the left worse than the right.  He rates the pain as a 10/10 worsening with standing, walking.  He reports that he is unable to work due to his knee pain.  He has had a corticosteroid injection in the past which did provide him with some relief.  He is interested in the viscosupplementation injections if possible.    Knee Review of Systems:  Swelling?  yes  Instability?  yes  Mechanical sx?  yes  <30 min AM stiffness? no  Limited ROM? yes  Fever/Chills? no    Comorbid Conditions  Hypertension   Diabetes       Objective:      Physical Exam:    /70   Pulse 102   Ht 5' 7" (1.702 m)   Wt 97.4 kg (214 lb 11.7 oz)   SpO2 99%   BMI 33.63 kg/m²     Ortho/SPM Exam    Appearance:  antalgic  FWB  Alignment: Left: normal Right: normal   Soft tissue swelling: Left: no Right: no  Effusion: Left:  Negative Right: Negative  Erythema: Left no Right: no  Ecchymosis: Left: no Right: no  Atrophy: Left: no Right: no    Palpation:  Knee Tenderness: Left: Medial joint line and Lateral joint line Right: Medial joint line and Lateral joint line    Range of motion:  Flexion (140): Left:  120 Right: 120  Extension (0): Left: 5 Right: 5    Strength:  Extension: Left 4/5  Pain: yes     Right 4/5 Pain: yes  Flexion: Left 4/5 Pain: " yes Right   4/5 Pain: yes        Special Tests:  Ballotable Effusion:Left: Negative Right: Negative   Fluid Wave: Left: Negative Right: Negative   Crepitus: Left: Positive Right: Positive   Patellar grind test: Left: Negative  Right: Negative  Apprehension test: Left: Negative Right: Negative   Varus: @ 0, Left Negative Right: Negative.  @ 30, Left Negative  Right Negative   Valgus: @ 0, Left Negative Right: Negative.  @ 30, Left Negative  Right Negative  Lachman: Left: Negative Right: Negative   Ant Drawer: Left: Negative Right: Negative   Posterior Drawer: Left: Negative Right: Negative         General appearance: NAD  Peripheral pulses: normal bilaterally   Reflexes: Left: normal Right normal   Sensation: normal    Labs:  Last A1c: 6.2     Imaging:   Previous images reviewed.  X-rays ordered and performed today: no  # of views: 4 Laterality: bilateral  My Interpretation:  Previous x-rays from  10/29/2024  Left knee:  Left knee shows tricompartmental joint space narrowing with the medial compartment being the worst.  Kl grade 4.  Osteophytes noted.  Right knee:  Right knee shows tricompartmental joint space narrowing with the medial compartment being the worse.  Osteophytes noted kl grade 4    Assessment:        Encounter Diagnoses   Code Name Primary?    M17.0 Primary osteoarthritis of both knees Yes        Plan:    Dx: bilateral Knee Osteoarthritis, moderate exacerbation  Treatment Plan: : Discussed with patient diagnosis and treatment recommendations. Handout given. Recommend conservative treatment to include: avoidance of aggravating activity, significant modification of daily activities, hot/cold therapies, topical and oral medications, braces, HEP/PT/OT, and injections.   Patient with bilateral knee osteoarthritis with moderate exacerbation.  We will give him bilateral corticosteroid injections at this time.  Patient can continue using topical and oral medications for pain relief.  Discussed with the patient  in the great length that it will take multiple forms of treatment to help him with his pain.  Patient reports that he is currently not interested in surgery at this time.  He is interested in viscosupplementation injections.  Counseled patient on the importance of a home exercise program and formal physical therapy to help him with strengthening and improvement of his pain.  We will send a prior authorization for viscosupplementation and we will see patient back once that becomes approved.  Imaging: prior radiological studies independently reviewed; discussed with patient; agree with radiologist interpretation.   Weight Management: is paramount. recommend at least 10% of total body weight loss if your bmi is 30-34.9. A bmi 24.9 or less may provide further relief..   Procedure: Discussed CSI/VSI as treatment options; discussed injections as treatment options; since conservative measures did not improve symptoms patient consented for CSI today.  Activity: Activity as tolerated; HEP to include aerobic conditioning and strength training with non-painful activity. ROM/STG exercises. Proper footware; assistive devises to avoid limping.  Patient can work as tolerated.  He may not be able to tolerate a job that required extensive standing, walking, stairs.  No restrictions at this time  Therapy: Physical Therapy  Medication: START Voltaren Gel 1% as prescribed to affected area  START diclofenac 75mg twice a day. Please see your primary care physician for further refills.  RTC: schedule after PA obtained; schedule for once a week for 3 weeks and then 6 months for follow-up appointment.         Large Joint Aspiration/Injection: bilateral supra patellar bursa    Date/Time: 2/25/2025 8:30 AM    Performed by: Earl Gonzales DO  Authorized by: Earl Gonzales, DO    Consent Done?:  Yes (Written)  Indications:  Arthritis and pain  Site marked: the procedure site was marked    Prep: patient was prepped and draped in usual sterile fashion       Local anesthesia used?: Yes    Local anesthetic:  Topical anesthetic    Details:  Needle Size:  21 G  Ultrasonic Guidance for needle placement?: Yes    Images are saved and documented.  Approach:  Superior  Location:  Knee  Laterality:  Bilateral  Site:  Bilateral supra patellar bursa  Aspirate (Left) amount (mL):  28  Aspirate (Left):  Clear, yellow and serous  Patient tolerance:  Patient tolerated the procedure well with no immediate complications      Staff Attending: Arleth Gonzalez MD    Risks:  Possible complications with the injection include bleeding, infection (.01%), tendon rupture, steroid flare, fat pad or soft tissue atrophy, skin depigmentation, allergic reaction to medications and vasovagal response. (steroid flare treatment is rest, ice, NSAIDs and resolves in 24-36 hours.)    Consent:  No absolute contraindications (cellulitis overlying joint, infection, lack of informed consent, allergy to injection medication, AVN protein or egg allergy for sodium hyaluronate, or history of steroid flare) or relative contraindications (uncontrolled DM2 A1c>10, coagulopathy, INR > 3.5, previous joint replacement or history of AVN).        Description:  The patient was prepped in normal sterile fashion use of chlorhexidine scrub and the appropriate and anatomic landmarks were identified around the KNEE with ultrasound as image guidance. The patient was evaluated with a Apperian ultrasound machine using a 10 MHz linear probe, following a standard protocol. Ultrasound imaging confirmed placement of the needle in the correct position, with reference to surrounding anatomic structures. A therapeutic and diagnostic injection was targeted at the suprapatellar recess. Sonographic examination of the bilateral knee was performed in real-time using a 10 MHz linear probe. Longitudinal and transverse scans were visualized, and image(s) were obtained from the anterior aspect of the bilateral knee.  Dynamic visualization  "of the needle was continuous throughout the procedure(s) and maintained good position. The ultrasound image for needle placement was captured and saved in the patient's medical record.  The joint capsule was observed to expand under ultrasound. Care was taken to ensure there was unrestricted flow of syringe contents (listed below) into the site of injection.  US Findings: moderate effusion seen in the superior patellar recess Joint space narrowing in the medial joint space with signs of meniscal damage Joint space narrowing in the lateral joint space with signs meniscal damage.. A total of 28 cc of clear yellow fluid was aspirated from the joint with a 18 gauge 1.5" needle.       Indication for use of Ultrasound Guidance: The indication for the use of ultrasound was to ensure accurate localization of needle for aspiration and/or injection, and minimize risk of damage to surrounding structures. Maris EL, Kelton S, Mateo LJ, Kilo BRUNSON. Improving injection accuracy of the elbow, knee, and shoulder: does injection site and imaging make a difference? A systematic review. Am J Sports Med. 2011 Mar;39(3):656-62. doi: 10.1177/3765893480008239. Epub 2011 Jan 21. PMID: 39799932.    Body mass index is 33.63 kg/m².    Contents of syringe included: 5mL of 1% of lidocaine with 40mg of Kenalog (1mL of 40mg/mL)    Post Procedure: Patient alert, and moving all extremities. ROM improved, pain decreased.  Good peripheral pulses, no signs of vascular compromise and range of motion intact.  Aftercare instructions were given to patient at time of discharge.  Relative rest for 3 days-avoiding excess activity.  Place ice on the area for 15 minutes every 4-6 hours. Patient may take Tylenol a 1000 mg b.i.d. or ibuprofen 600 mg t.i.d. for the next 3-4 days if not on medication already and safe to take pending co-morbidities.  Protect the area for the next 1-8 hours if anesthetic was used.  Avoid excessive activity for the next 3-4 weeks.  ER " precautions given for fever, severe joint pain or allergic reaction or other new symptoms related to the joint injection.        Earl Gonzales D.O.  Sports Medicine Fellow

## 2025-02-25 ENCOUNTER — OFFICE VISIT (OUTPATIENT)
Dept: ORTHOPEDICS | Facility: CLINIC | Age: 60
End: 2025-02-25
Payer: MEDICAID

## 2025-02-25 VITALS
DIASTOLIC BLOOD PRESSURE: 70 MMHG | HEIGHT: 67 IN | OXYGEN SATURATION: 99 % | SYSTOLIC BLOOD PRESSURE: 136 MMHG | HEART RATE: 102 BPM | WEIGHT: 214.75 LBS | BODY MASS INDEX: 33.71 KG/M2

## 2025-02-25 DIAGNOSIS — M17.0 PRIMARY OSTEOARTHRITIS OF BOTH KNEES: Primary | ICD-10-CM

## 2025-02-25 PROCEDURE — 20611 DRAIN/INJ JOINT/BURSA W/US: CPT | Mod: 50,PBBFAC | Performed by: STUDENT IN AN ORGANIZED HEALTH CARE EDUCATION/TRAINING PROGRAM

## 2025-02-25 PROCEDURE — 99213 OFFICE O/P EST LOW 20 MIN: CPT | Mod: PBBFAC | Performed by: STUDENT IN AN ORGANIZED HEALTH CARE EDUCATION/TRAINING PROGRAM

## 2025-02-25 RX ORDER — DICLOFENAC SODIUM 10 MG/G
2 GEL TOPICAL 4 TIMES DAILY
Qty: 100 G | Refills: 3 | Status: SHIPPED | OUTPATIENT
Start: 2025-02-25

## 2025-02-25 RX ORDER — LIDOCAINE HYDROCHLORIDE 10 MG/ML
5 INJECTION, SOLUTION EPIDURAL; INFILTRATION; INTRACAUDAL; PERINEURAL
Status: COMPLETED | OUTPATIENT
Start: 2025-02-25 | End: 2025-02-25

## 2025-02-25 RX ORDER — TRIAMCINOLONE ACETONIDE 40 MG/ML
40 INJECTION, SUSPENSION INTRA-ARTICULAR; INTRAMUSCULAR ONCE
Status: COMPLETED | OUTPATIENT
Start: 2025-02-25 | End: 2025-02-25

## 2025-02-25 RX ORDER — MELOXICAM 15 MG/1
15 TABLET ORAL DAILY
Qty: 30 TABLET | Refills: 0 | Status: SHIPPED | OUTPATIENT
Start: 2025-02-25 | End: 2025-03-27

## 2025-02-25 RX ADMIN — TRIAMCINOLONE ACETONIDE 40 MG: 40 INJECTION, SUSPENSION INTRA-ARTICULAR; INTRAMUSCULAR at 09:02

## 2025-02-25 RX ADMIN — LIDOCAINE HYDROCHLORIDE 50 MG: 10 INJECTION, SOLUTION EPIDURAL; INFILTRATION; INTRACAUDAL; PERINEURAL at 09:02

## 2025-03-03 ENCOUNTER — TELEPHONE (OUTPATIENT)
Dept: ORTHOPEDICS | Facility: CLINIC | Age: 60
End: 2025-03-03

## 2025-03-03 NOTE — TELEPHONE ENCOUNTER
----- Message from Earl Gonzales sent at 3/2/2025  7:10 PM CST -----  Regarding: Visco PA  Please obtain prior authorization for viscosupplementation.  After obtaining, schedule a patient for a regular appointment as appropriate with Dr. Earl Gonzales  (Euflexxa/Orthovisc/Hyalgan/Synvisc 1 visit per week for 3 weeks or Durolane 1 visit) Laterality:  BilateralEstimated Return to Clinic:  When p.a. is complete.  At least 4 weeks from last visitEarl Gonzales D.O.Sports Medicine Fellow

## 2025-03-09 NOTE — PROGRESS NOTES
Faculty Attestation: Xavier Harmon  was seen in Sports Medicine Clinic. Services were furnished in a primary care sports medicine center in the outpatient department at a [facility]. Discussed with Dr. Gonzales at the time of the visit. History of Present Illness, Physical Exam, and Assessment and Plan reviewed.  I participated in the management of the patient and was immediately available throughout the encounter. Treatment plan is reasonable and appropriate. Compliance with treatment recommendations is important.    Radiology images independently reviewed and agree with radiologist interpretation. Procedure note reviewed. Patient tolerated procedure well.      Arleth Gonzalez MD  Sports Medicine      Insurance: Medicaid  Patient Type: Established patient to [facility]  Problem Type: Established condition to [facility]  Condition: Chronic Condition - acute moderate worsening of symptoms

## 2025-03-27 ENCOUNTER — TELEPHONE (OUTPATIENT)
Dept: ORTHOPEDICS | Facility: CLINIC | Age: 60
End: 2025-03-27
Payer: MEDICAID

## 2025-03-28 RX ORDER — MELOXICAM 15 MG/1
15 TABLET ORAL DAILY
Qty: 30 TABLET | Refills: 0 | Status: SHIPPED | OUTPATIENT
Start: 2025-03-28 | End: 2025-04-27

## 2025-04-02 LAB — CRC RECOMMENDATION EXT: NORMAL

## 2025-04-09 ENCOUNTER — TELEPHONE (OUTPATIENT)
Dept: ORTHOPEDICS | Facility: CLINIC | Age: 60
End: 2025-04-09
Payer: MEDICAID

## 2025-04-09 ENCOUNTER — DOCUMENTATION ONLY (OUTPATIENT)
Dept: INTERNAL MEDICINE | Facility: CLINIC | Age: 60
End: 2025-04-09
Payer: MEDICAID

## 2025-04-09 NOTE — TELEPHONE ENCOUNTER
----- Message from Med Assistant Patino sent at 3/3/2025 12:24 PM CST -----  Regarding: RE: Visco PA  PA to be done in April.  ----- Message -----  From: Earl Gonzales DO  Sent: 3/2/2025   7:10 PM CST  To: [facility] Support Staff  Subject: Visco PA                                         Please obtain prior authorization for viscosupplementation.  After obtaining, schedule a patient for a regular appointment as appropriate with Dr. Earl Gonzales  (Euflexxa/Orthovisc/Hyalgan/Synvisc 1 visit per week for 3 weeks or Durolane 1 visit) Laterality:  BilateralEstimated Return to Clinic:  When p.a. is complete.  At least 4 weeks from last visitEarl Gonzales D.O.Sports Medicine Fellow

## 2025-04-09 NOTE — TELEPHONE ENCOUNTER
No PA required for bilateral Euflexxa, Ref# [id number]. Please schedule patient for a regular appointment as appropriate with Dr. Gonzales. Thank you.

## 2025-05-02 ENCOUNTER — OFFICE VISIT (OUTPATIENT)
Dept: ORTHOPEDICS | Facility: CLINIC | Age: 60
End: 2025-05-02
Payer: MEDICAID

## 2025-05-02 VITALS
HEART RATE: 93 BPM | BODY MASS INDEX: 33.71 KG/M2 | DIASTOLIC BLOOD PRESSURE: 100 MMHG | HEIGHT: 67 IN | SYSTOLIC BLOOD PRESSURE: 158 MMHG | WEIGHT: 214.75 LBS

## 2025-05-02 DIAGNOSIS — M17.0 PRIMARY OSTEOARTHRITIS OF BOTH KNEES: Primary | ICD-10-CM

## 2025-05-02 PROCEDURE — 99213 OFFICE O/P EST LOW 20 MIN: CPT | Mod: PBBFAC,25 | Performed by: STUDENT IN AN ORGANIZED HEALTH CARE EDUCATION/TRAINING PROGRAM

## 2025-05-02 PROCEDURE — 20611 DRAIN/INJ JOINT/BURSA W/US: CPT | Mod: 50,PBBFAC | Performed by: STUDENT IN AN ORGANIZED HEALTH CARE EDUCATION/TRAINING PROGRAM

## 2025-05-02 RX ORDER — AMOXICILLIN 500 MG/1
500 TABLET, FILM COATED ORAL 3 TIMES DAILY
COMMUNITY
Start: 2025-04-08

## 2025-05-02 RX ORDER — DICLOFENAC SODIUM 10 MG/G
2 GEL TOPICAL 4 TIMES DAILY
Qty: 100 G | Refills: 3 | Status: SHIPPED | OUTPATIENT
Start: 2025-05-02

## 2025-05-02 RX ORDER — DICLOFENAC SODIUM 75 MG/1
75 TABLET, DELAYED RELEASE ORAL 2 TIMES DAILY
Qty: 60 TABLET | Refills: 0 | Status: SHIPPED | OUTPATIENT
Start: 2025-05-02 | End: 2025-06-01

## 2025-05-02 RX ADMIN — Medication 20 MG: at 09:05

## 2025-05-02 NOTE — PROGRESS NOTES
"Subjective:    Patient ID: Xavier Harmon is a 59 y.o. male  who presented to [facility] for follow up.      Chief Complaint: Pain of the Right Knee and Pain of the Left Knee      History of Present Illness:  Xavier Harmon is a 59-year-old male who presents to the clinic today for follow-up on bilateral knee osteoarthritis.  He reports that he has had excellent relief with bilateral corticosteroid injections.  He reports that he is currently doing home exercise programs and reports that it has been helping him with his symptoms.  He is using oral medications with mild improvement in his symptoms.  He reports that he has pain today is a 6/10 with the left being worse than the right.  Pain is worse with ambulation or prolonged standing or walking.  He denies any mechanical symptoms at this time.    Knee Review of Systems:  Swelling?  yes  Instability?  no  Mechanical sx?  no  <30 min AM stiffness? no  Limited ROM? yes  Fever/Chills? no    Comorbid Conditions  Obesity       Objective:      Physical Exam:    BP (!) 158/100 Comment: Pt. states he is out of his Lisinopril but took his Amlodipine.  Pulse 93   Ht 5' 7" (1.702 m)   Wt 97.4 kg (214 lb 11.7 oz)   BMI 33.63 kg/m²     Ortho/SPM Exam    Appearance:  Normal gait/station  FWB  Alignment: Left: normal Right: normal   Soft tissue swelling: Left: no Right: yes  Effusion: Left:  Negative Right: Positive  Erythema: Left no Right: no  Ecchymosis: Left: no Right: no  Atrophy: Left: no Right: no    Palpation:  Knee Tenderness: Left: Medial joint line and Lateral joint line Right: Medial joint line and Lateral joint line    Range of motion:  Flexion (140): Left:  120 Right: 110  Extension (0): Left: 0 Right: 0    Strength:  Extension: Left 5/5  Pain: yes     Right 5/5 Pain: yes  Flexion: Left 5/5 Pain: no Right   5/5 Pain: no        Special Tests:  Ballotable Effusion:Left: Negative Right: Negative   Fluid Wave: Left: " Negative Right: Negative   Crepitus: Left: Positive Right: Positive   Patellar grind test: Left: Negative  Right: Negative  Apprehension test: Left: Negative Right: Negative   Varus: @ 0, Left Negative Right: Negative.  @ 30, Left Negative  Right Negative   Valgus: @ 0, Left Negative Right: Negative.  @ 30, Left Negative  Right Negative  Lachman: Left: Negative Right: Negative   Ant Drawer: Left: Negative Right: Negative   Posterior Drawer: Left: Negative Right: Negative     General appearance: NAD  Peripheral pulses: normal bilaterally   Reflexes: Left: normal Right normal   Sensation: normal    Labs:  Last A1c: 6.2     Imaging:   Previous images reviewed.  X-rays ordered and performed today: no    Assessment:        Encounter Diagnoses   Code Name Primary?    M17.0 Primary osteoarthritis of both knees Yes        Plan:   Dx: Bilateral Knee Osteoarthritis.  Moderate exacerbation  Treatment Plan: : Discussed with patient diagnosis and treatment recommendations. Handout given. Recommend conservative treatment to include: avoidance of aggravating activity, significant modification of daily activities, hot/cold therapies, topical and oral medications, braces, HEP/PT/OT, and injections.   Patient was exacerbation of his bilateral knee osteoarthritis.  He had excellent relief with his previous corticosteroid injections.  We will start VS I series today.  He can continue using oral and topical medications for pain relief.  Counseled patient to continue with home exercise program for strengthening of his lower extremities.  We will see patient back next week for his 2nd injection.  Imaging: prior radiological studies independently reviewed; discussed with patient; agree with radiologist interpretation.   Weight Management: is paramount. recommend at least 10% of total body weight loss if your bmi is 30-34.9. A bmi 24.9 or less may provide further relief..   Procedure: Discussed CSI/VSI as treatment options; discussed  injections as treatment options; since conservative measures did not improve symptoms patient consented for starting VS series today.  Activity: Activity as tolerated; HEP to include aerobic conditioning and strength training with non-painful activity. ROM/STG exercises. Proper footware; assistive devises to avoid limping.   Therapy: Physical Therapy  Medication: START diclofenac 75mg twice a day  CONTINUE over-the-counter acetaminophen (Tylenol 1000 mg three times per day as needed)  CONTINUE Voltaren Gel 1% as prescribed. Please see your primary care physician for further refills.  RTC: 1 week.         Large Joint Aspiration/Injection: bilateral supra patellar bursa    Date/Time: 5/2/2025 8:30 AM    Performed by: Earl Gonzales DO  Authorized by: Earl Gonzales, DO    Consent Done?:  Yes (Written)  Indications:  Arthritis and pain  Site marked: the procedure site was marked    Prep: patient was prepped and draped in usual sterile fashion      Local anesthesia used?: Yes    Local anesthetic:  Topical anesthetic    Details:  Needle Size:  21 G  Ultrasonic Guidance for needle placement?: Yes    Images are saved and documented.  Approach:  Superior  Location:  Knee  Laterality:  Bilateral  Site:  Bilateral supra patellar bursa  Aspirate (Left) amount (mL):  16  Patient tolerance:  Patient tolerated the procedure well with no immediate complications      Staff Attending: Otoniel Osuna MD    Risks:  Possible complications with the injection include bleeding, infection (.01%), tendon rupture, steroid flare, fat pad or soft tissue atrophy, skin depigmentation, allergic reaction to medications and vasovagal response. (steroid flare treatment is rest, ice, NSAIDs and resolves in 24-36 hours.)    Consent:  No absolute contraindications (cellulitis overlying joint, infection, lack of informed consent, allergy to injection medication, AVN protein or egg allergy for sodium hyaluronate, or history of steroid flare) or relative  "contraindications (uncontrolled DM2 A1c>10, coagulopathy, INR > 3.5, previous joint replacement or history of AVN).        Description:  The patient was prepped in normal sterile fashion use of chlorhexidine scrub and the appropriate and anatomic landmarks were identified with ultrasound as image guidance. The patient was evaluated with a [employer] ultrasound machine using a 10 MHz linear probe, following a standard protocol. Ultrasound imaging confirmed placement of the needle in the correct position, with reference to surrounding anatomic structures. Dynamic visualization of the needle was continuous throughout the procedure(s) and maintained good position. Care was taken to ensure there was unrestricted flow of syringe contents (listed below) into the site of injection. The ultrasound image for needle placement was captured and saved in the patient's medical record.  . A total of 16 cc of clear yellow fluid was aspirated from the joint with a 18 gauge 1.5" needle.       Indication for use of Ultrasound Guidance: The indication for the use of ultrasound was to ensure accurate localization of needle for aspiration and/or injection, and minimize risk of damage to surrounding structures. Maris EL, Kelton S, Mateo LJ, Kilo BRUNSON. Improving injection accuracy of the elbow, knee, and shoulder: does injection site and imaging make a difference? A systematic review. Am J Sports Med. 2011 Mar;39(3):656-62. doi: 10.1177/5568353085682745. Epub 2011 Jan 21. PMID: 12085207.    Body mass index is 33.63 kg/m².    Contents of syringe included: 2mL of 10mg/ml of Euflexxa Injected.     Post Procedure: Patient alert, and moving all extremities. ROM improved, pain decreased.  Good peripheral pulses, no signs of vascular compromise and range of motion intact.  Aftercare instructions were given to patient at time of discharge.  Relative rest for 3 days-avoiding excess activity.  Place ice on the area for 15 minutes every 4-6 hours. " Patient may take Tylenol a 1000 mg b.i.d. or ibuprofen 600 mg t.i.d. for the next 3-4 days if not on medication already and safe to take pending co-morbidities.  Protect the area for the next 1-8 hours if anesthetic was used.  Avoid excessive activity for the next 3-4 weeks.  ER precautions given for fever, severe joint pain or allergic reaction or other new symptoms related to the joint injection.         Earl Gonzales D.O.  Sports Medicine Fellow

## 2025-05-05 NOTE — PROGRESS NOTES
Faculty Attestation: Xavier Harmon  was seen in [facility] Medicine Clinic Discussed with Dr. Gonzales at the time of the visit. History of Present Illness, Physical Exam, and Assessment and Plan reviewed.     Treatment plan is reasonable and appropriate. Compliance with treatment recommendations is important.      No imaging studies were done today.     Procedure note reviewed. Patient tolerated procedure well.     Otoniel Osuna MD  Sports Medicine

## 2025-05-06 ENCOUNTER — OFFICE VISIT (OUTPATIENT)
Dept: FAMILY MEDICINE | Facility: CLINIC | Age: 60
End: 2025-05-06
Payer: MEDICAID

## 2025-05-06 VITALS
DIASTOLIC BLOOD PRESSURE: 80 MMHG | SYSTOLIC BLOOD PRESSURE: 142 MMHG | RESPIRATION RATE: 18 BRPM | WEIGHT: 211 LBS | HEIGHT: 67 IN | HEART RATE: 74 BPM | OXYGEN SATURATION: 99 % | TEMPERATURE: 99 F | BODY MASS INDEX: 33.12 KG/M2

## 2025-05-06 DIAGNOSIS — A63.0 CONDYLOMA ACUMINATUM OF PENIS: Primary | ICD-10-CM

## 2025-05-06 PROCEDURE — 99213 OFFICE O/P EST LOW 20 MIN: CPT | Mod: PBBFAC

## 2025-05-06 PROCEDURE — 17110 DESTRUCTION B9 LES UP TO 14: CPT | Mod: PBBFAC | Performed by: FAMILY MEDICINE

## 2025-05-06 NOTE — PROGRESS NOTES
Subjective:       Patient ID: Xavier Harmon is a 59 y.o. male.    Chief Complaint: condyloma    HPI  58 yo returns to minor surgery for follow-up of condyloma lesions on his penis.  He reports significant improvement in size of lesions after last cryotherapy and Aldara at home. No side effects of treatment and pt tolerated it well    Review of Systems  As per HPI         Objective:      Vitals:    05/06/25 0940   BP: (!) 142/80   Pulse: 74   Resp: 18   Temp: 98.6 °F (37 °C)       Physical Exam    Gen: alert, no acute distress  Skin: 3 small flesh colored lesions on penis  Psych: cooperative, appropriate mood and affect    Procedure Note:  Procedure:  Cryotherapy for 3 verrucoid lesions  Performed by: Cyndy Molina MD  Consent: signed consent obtained after discussion of risks, benefits, and alternative treatments. Time out completed  Description: Liquid nitrogen used for cryotherapy. Tip held 1 cm from lesion and sprayed in freeze-thaw cycle.   The patient tolerated the procedure well with no complications.       Assessment/Plan:  Condyloma acuminatum of penis    - cryo today  - Post care instructions and return precautions discussed.   - lesions almost resolved.  Patient to return to minor surgery if lesions do not respond to this round of cryotherapy.  Patient does have Aldara at home.     Follow up if symptoms worsen or fail to improve.

## 2025-05-07 ENCOUNTER — TELEPHONE (OUTPATIENT)
Dept: INTERNAL MEDICINE | Facility: CLINIC | Age: 60
End: 2025-05-07
Payer: MEDICAID

## 2025-05-07 NOTE — PROGRESS NOTES
Large Joint Aspiration/Injection: bilateral supra patellar bursa    Date/Time: 5/9/2025 8:30 AM    Performed by: Earl Gonzales DO  Authorized by: Earl Gonzales, DO    Consent Done?:  Yes (Written)  Indications:  Arthritis and pain  Site marked: the procedure site was marked    Prep: patient was prepped and draped in usual sterile fashion      Local anesthesia used?: Yes    Local anesthetic:  Topical anesthetic    Details:  Needle Size:  21 G  Ultrasonic Guidance for needle placement?: Yes    Images are saved and documented.  Approach:  Superior  Location:  Knee  Laterality:  Bilateral  Site:  Bilateral supra patellar bursa  Patient tolerance:  Patient tolerated the procedure well with no immediate complications      Staff Attending: Otoniel Osuna MD    Risks:  Possible complications with the injection include bleeding, infection (.01%), tendon rupture, steroid flare, fat pad or soft tissue atrophy, skin depigmentation, allergic reaction to medications and vasovagal response. (steroid flare treatment is rest, ice, NSAIDs and resolves in 24-36 hours.)    Consent:  No absolute contraindications (cellulitis overlying joint, infection, lack of informed consent, allergy to injection medication, AVN protein or egg allergy for sodium hyaluronate, or history of steroid flare) or relative contraindications (uncontrolled DM2 A1c>10, coagulopathy, INR > 3.5, previous joint replacement or history of AVN).        Description:  The patient was prepped in normal sterile fashion use of chlorhexidine scrub and the appropriate and anatomic landmarks were identified with ultrasound as image guidance. The patient was evaluated with a [employer] ultrasound machine using a 10 MHz linear probe, following a standard protocol. Ultrasound imaging confirmed placement of the needle in the correct position, with reference to surrounding anatomic structures. Dynamic visualization of the needle was continuous throughout the procedure(s) and  maintained good position. Care was taken to ensure there was unrestricted flow of syringe contents (listed below) into the site of injection. The ultrasound image for needle placement was captured and saved in the patient's medical record.  .      Indication for use of Ultrasound Guidance: The indication for the use of ultrasound was to ensure accurate localization of needle for aspiration and/or injection, and minimize risk of damage to surrounding structures. Maris EL, Kelton S, Mateo LJ, Kilo BJ. Improving injection accuracy of the elbow, knee, and shoulder: does injection site and imaging make a difference? A systematic review. Am J Sports Med. 2011 Mar;39(3):656-62. doi: 10.1177/4024777213487957. Epub 2011 Jan 21. PMID: 00954574.    Body mass index is 32.41 kg/m².    Contents of syringe included: 2mL of 10mg/ml of Euflexxa Injected.     Post Procedure: Patient alert, and moving all extremities. ROM improved, pain decreased.  Good peripheral pulses, no signs of vascular compromise and range of motion intact.  Aftercare instructions were given to patient at time of discharge.  Relative rest for 3 days-avoiding excess activity.  Place ice on the area for 15 minutes every 4-6 hours. Patient may take Tylenol a 1000 mg b.i.d. or ibuprofen 600 mg t.i.d. for the next 3-4 days if not on medication already and safe to take pending co-morbidities.  Protect the area for the next 1-8 hours if anesthetic was used.  Avoid excessive activity for the next 3-4 weeks.  ER precautions given for fever, severe joint pain or allergic reaction or other new symptoms related to the joint injection.       Earl Gonzales D.O.  Sports Medicine Fellow

## 2025-05-08 DIAGNOSIS — I10 PRIMARY HYPERTENSION: ICD-10-CM

## 2025-05-08 RX ORDER — LISINOPRIL 40 MG/1
40 TABLET ORAL DAILY
Qty: 90 TABLET | Refills: 2 | Status: SHIPPED | OUTPATIENT
Start: 2025-05-08 | End: 2026-02-02

## 2025-05-08 NOTE — TELEPHONE ENCOUNTER
Steven called stating lisinopril refill was denied due to no response to fax that was sent over. Pharmacy requesting r/f on medication.

## 2025-05-09 ENCOUNTER — OFFICE VISIT (OUTPATIENT)
Dept: ORTHOPEDICS | Facility: CLINIC | Age: 60
End: 2025-05-09
Payer: MEDICAID

## 2025-05-09 VITALS
SYSTOLIC BLOOD PRESSURE: 166 MMHG | TEMPERATURE: 98 F | BODY MASS INDEX: 32.31 KG/M2 | HEART RATE: 114 BPM | DIASTOLIC BLOOD PRESSURE: 74 MMHG | WEIGHT: 213.19 LBS | HEIGHT: 68 IN

## 2025-05-09 DIAGNOSIS — M17.0 PRIMARY OSTEOARTHRITIS OF BOTH KNEES: Primary | ICD-10-CM

## 2025-05-09 PROCEDURE — 20611 DRAIN/INJ JOINT/BURSA W/US: CPT | Mod: 50,PBBFAC | Performed by: STUDENT IN AN ORGANIZED HEALTH CARE EDUCATION/TRAINING PROGRAM

## 2025-05-09 PROCEDURE — 99213 OFFICE O/P EST LOW 20 MIN: CPT | Mod: PBBFAC | Performed by: STUDENT IN AN ORGANIZED HEALTH CARE EDUCATION/TRAINING PROGRAM

## 2025-05-09 RX ORDER — LIDOCAINE HYDROCHLORIDE 10 MG/ML
5 INJECTION, SOLUTION EPIDURAL; INFILTRATION; INTRACAUDAL; PERINEURAL
Status: COMPLETED | OUTPATIENT
Start: 2025-05-09 | End: 2025-05-09

## 2025-05-09 RX ADMIN — Medication 20 MG: at 09:05

## 2025-05-09 RX ADMIN — LIDOCAINE HYDROCHLORIDE 50 MG: 10 INJECTION, SOLUTION EPIDURAL; INFILTRATION; INTRACAUDAL; PERINEURAL at 09:05

## 2025-05-21 ENCOUNTER — OFFICE VISIT (OUTPATIENT)
Dept: ORTHOPEDICS | Facility: CLINIC | Age: 60
End: 2025-05-21
Payer: MEDICAID

## 2025-05-21 VITALS
HEART RATE: 80 BPM | DIASTOLIC BLOOD PRESSURE: 89 MMHG | SYSTOLIC BLOOD PRESSURE: 160 MMHG | BODY MASS INDEX: 32.31 KG/M2 | HEIGHT: 68 IN | WEIGHT: 213.19 LBS

## 2025-05-21 DIAGNOSIS — M17.0 PRIMARY OSTEOARTHRITIS OF BOTH KNEES: Primary | ICD-10-CM

## 2025-05-21 PROCEDURE — 20611 DRAIN/INJ JOINT/BURSA W/US: CPT | Mod: 50,PBBFAC | Performed by: STUDENT IN AN ORGANIZED HEALTH CARE EDUCATION/TRAINING PROGRAM

## 2025-05-21 PROCEDURE — 99213 OFFICE O/P EST LOW 20 MIN: CPT | Mod: PBBFAC | Performed by: STUDENT IN AN ORGANIZED HEALTH CARE EDUCATION/TRAINING PROGRAM

## 2025-05-21 RX ORDER — LIDOCAINE HYDROCHLORIDE 10 MG/ML
5 INJECTION, SOLUTION EPIDURAL; INFILTRATION; INTRACAUDAL; PERINEURAL
Status: COMPLETED | OUTPATIENT
Start: 2025-05-21 | End: 2025-05-21

## 2025-05-21 RX ADMIN — Medication 20 MG: at 10:05

## 2025-05-21 RX ADMIN — LIDOCAINE HYDROCHLORIDE 50 MG: 10 INJECTION, SOLUTION EPIDURAL; INFILTRATION; INTRACAUDAL; PERINEURAL at 10:05

## 2025-05-21 NOTE — PROGRESS NOTES
Large Joint Aspiration/Injection: bilateral supra patellar bursa    Date/Time: 5/21/2025 9:30 AM    Performed by: Earl Gonzales DO  Authorized by: Earl Gonzales, DO    Consent Done?:  Yes (Written)  Indications:  Arthritis and pain  Site marked: the procedure site was marked    Prep: patient was prepped and draped in usual sterile fashion      Local anesthesia used?: Yes    Local anesthetic:  Topical anesthetic    Details:  Needle Size:  21 G  Ultrasonic Guidance for needle placement?: Yes    Images are saved and documented.  Approach:  Superior  Location:  Knee  Laterality:  Bilateral  Site:  Bilateral supra patellar bursa  Aspirate (Left) amount (mL):  40  Aspirate (Left):  Clear, serous and yellow  Patient tolerance:  Patient tolerated the procedure well with no immediate complications    Staff Attending: Otoniel Osuna MD    Risks:  Possible complications with the injection include bleeding, infection (.01%), tendon rupture, steroid flare, fat pad or soft tissue atrophy, skin depigmentation, allergic reaction to medications and vasovagal response. (steroid flare treatment is rest, ice, NSAIDs and resolves in 24-36 hours.)    Consent:  No absolute contraindications (cellulitis overlying joint, infection, lack of informed consent, allergy to injection medication, AVN protein or egg allergy for sodium hyaluronate, or history of steroid flare) or relative contraindications (uncontrolled DM2 A1c>10, coagulopathy, INR > 3.5, previous joint replacement or history of AVN).        Description:  The patient was prepped in normal sterile fashion use of chlorhexidine scrub and the appropriate and anatomic landmarks were identified with ultrasound as image guidance. The patient was evaluated with a [employer] ultrasound machine using a 10 MHz linear probe, following a standard protocol. Ultrasound imaging confirmed placement of the needle in the correct position, with reference to surrounding anatomic structures. Dynamic  "visualization of the needle was continuous throughout the procedure(s) and maintained good position. Care was taken to ensure there was unrestricted flow of syringe contents (listed below) into the site of injection. The ultrasound image for needle placement was captured and saved in the patient's medical record.  . A total of 40cc of clear yellow fluid was aspirated from the joint with a 18 gauge 1.5" needle.       Indication for use of Ultrasound Guidance: The indication for the use of ultrasound was to ensure accurate localization of needle for aspiration and/or injection, and minimize risk of damage to surrounding structures. Maris EL, Kelton S, Mateo LJ, Kilo BJ. Improving injection accuracy of the elbow, knee, and shoulder: does injection site and imaging make a difference? A systematic review. Am J Sports Med. 2011 Mar;39(3):656-62. doi: 10.1177/2877319076361529. Epub 2011 Jan 21. PMID: 40673131.    There is no height or weight on file to calculate BMI.    Contents of syringe included: 2mL of 10mg/ml of Euflexxa Injected.     Post Procedure: Patient alert, and moving all extremities. ROM improved, pain decreased.  Good peripheral pulses, no signs of vascular compromise and range of motion intact.  Aftercare instructions were given to patient at time of discharge.  Relative rest for 3 days-avoiding excess activity.  Place ice on the area for 15 minutes every 4-6 hours. Patient may take Tylenol a 1000 mg b.i.d. or ibuprofen 600 mg t.i.d. for the next 3-4 days if not on medication already and safe to take pending co-morbidities.  Protect the area for the next 1-8 hours if anesthetic was used.  Avoid excessive activity for the next 3-4 weeks.  ER precautions given for fever, severe joint pain or allergic reaction or other new symptoms related to the joint injection.       Earl Gonzales D.O.  Sports Medicine Fellow    "

## 2025-06-25 ENCOUNTER — TELEPHONE (OUTPATIENT)
Dept: INTERNAL MEDICINE | Facility: CLINIC | Age: 60
End: 2025-06-25
Payer: MEDICAID

## 2025-06-25 NOTE — TELEPHONE ENCOUNTER
Copied from CRM #[id number]. Topic: Medications - Pharmacy  >> Jun 19, 2025 11:26 AM Barby wrote:  [facility]    Pharmacy is calling to request assistance with Rx    Pharmacy name and phone number: [id number] - [place], [place] - [address] AT [facility] [address]      Pharmacy contact: [phone]  Patient Name: luis reilly  Prescription Name:    amLODIPine (NORVASC) 10 MG tablet    What do they need to clarify?:authorization        Preferred Method of Contact: Phone Call  Patient's Preferred Phone Number on File: [phone]   Best Call Back Number, if different:  Additional Information:

## 2025-07-14 ENCOUNTER — PATIENT OUTREACH (OUTPATIENT)
Facility: CLINIC | Age: 60
End: 2025-07-14
Payer: MEDICAID

## 2025-07-14 NOTE — PROGRESS NOTES
Health Maintenance Topic(s) Outreach Outcomes & Actions Taken:    Low Dose CT Screening - Outreach Outcomes & Actions Taken  : message to pcp        Additional Notes:  LDCT Report:

## 2025-07-28 ENCOUNTER — TELEPHONE (OUTPATIENT)
Dept: ORTHOPEDICS | Facility: CLINIC | Age: 60
End: 2025-07-28
Payer: MEDICAID

## 2025-07-28 ENCOUNTER — TELEPHONE (OUTPATIENT)
Dept: INTERNAL MEDICINE | Facility: CLINIC | Age: 60
End: 2025-07-28

## 2025-07-28 ENCOUNTER — OFFICE VISIT (OUTPATIENT)
Dept: INTERNAL MEDICINE | Facility: CLINIC | Age: 60
End: 2025-07-28
Payer: MEDICAID

## 2025-07-28 DIAGNOSIS — I10 PRIMARY HYPERTENSION: Primary | Chronic | ICD-10-CM

## 2025-07-28 DIAGNOSIS — M17.0 PRIMARY OSTEOARTHRITIS OF BOTH KNEES: Primary | ICD-10-CM

## 2025-07-28 PROCEDURE — 98013 SYNCH AUDIO-ONLY EST LOW 20: CPT | Mod: 93,,, | Performed by: NURSE PRACTITIONER

## 2025-07-28 PROCEDURE — 4010F ACE/ARB THERAPY RXD/TAKEN: CPT | Mod: CPTII,93,, | Performed by: NURSE PRACTITIONER

## 2025-07-28 PROCEDURE — 1159F MED LIST DOCD IN RCRD: CPT | Mod: CPTII,93,, | Performed by: NURSE PRACTITIONER

## 2025-07-28 PROCEDURE — 1160F RVW MEDS BY RX/DR IN RCRD: CPT | Mod: CPTII,93,, | Performed by: NURSE PRACTITIONER

## 2025-07-28 NOTE — LETTER
July 29, 2025    Xavier Harmon  [address]  [address] [place] [id number]             [facility]  [facility] [address]  Phone: [phone] To whom it may concern    The above patient has severe osteoarthritis of bilateral knees which is causing him difficulty to climb stairs. We are requesting that he be put in a lower level apartment in order to help with his pain in his knees until he is able to have possible knee replacements.     If you have any questions or concerns please contact our office at your earliest convenience.

## 2025-07-28 NOTE — TELEPHONE ENCOUNTER
Waiting on transportation, running behind. Nee letter stating his condition to give to landlord for apartment. Currently homeless.

## 2025-07-28 NOTE — PROGRESS NOTES
Ginny Chery, GENTRY   [facility]  [address] [facility] [address]      PATIENT NAME: Xavier Harmon  : 1965  DATE: 25  MRN: [id number]      Reason for Visit / Chief Complaint: Hypertension       History of Present Illness / Problem Focused Workflow     Xavier Harmon presents to the clinic with Hypertension     60 yo male here today for f/u. Medical problems includes HTN, HCV, and Tobacco use (1/2 ppd), 30 ppy hx). Followed by [facility].      Prostate Cancer Screening - 10/05/23 PSA 0.57  Colon Cancer Screening - 10/10/23 FIT Negative   Osteoporosis Screening - 10/05/23 Vitamin D level 43.4  STI Screening - [facility]   Lung Cancer Screening - 10/03/2023 Ordered  Wellness -2025  Pt here today via audio only for an office visit. Pt does not have a BP cuff at home to check, will come in for a nurse visit for a BP check in 3 weeks.  Patient reports that he needed a letter for his landlord to be able to me on a ground floor appointment due to his knee issues, patient already contacted the orthopedic clinic this morning for that assistance, we will follow up with me in anything is needed further.  Patient agreeable to lung cancer screening ordered today as well.  Denies any acute issues or concerns.  We will schedule follow up with me for an 6 months or sooner if needed.            Review of Systems     Review of Systems   Constitutional: Negative.    HENT: Negative.     Eyes: Negative.    Respiratory: Negative.     Cardiovascular: Negative.    Gastrointestinal: Negative.    Endocrine: Negative.    Genitourinary: Negative.    Neurological: Negative.    Psychiatric/Behavioral: Negative.           Medications and Allergies     Medications  Medication List with Changes/Refills   Current Medications    AMLODIPINE (NORVASC) 10 MG TABLET    Take 1 tablet (10 mg total) by mouth once daily. For High Blood Pressure     AMOXICILLIN (AMOXIL) 500 MG TAB    Take 500 mg by mouth 3 (three) times daily.    DICLOFENAC SODIUM (VOLTAREN ARTHRITIS PAIN) 1 % GEL    Apply 2 g topically 4 (four) times daily. Do not exceed 32 grams/day: do not to exceed 8 grams/day/single joint of upper extremities; do not to exceed 16 grams/day/single joint of lower extremities.    DICLOFENAC SODIUM (VOLTAREN ARTHRITIS PAIN) 1 % GEL    Apply 2 g topically 4 (four) times daily. Do not exceed 32 grams/day: do not to exceed 8 grams/day/single joint of upper extremities; do not to exceed 16 grams/day/single joint of lower extremities.  Please request refill of this medication from your PCP.    DICLOFENAC SODIUM (VOLTAREN ARTHRITIS PAIN) 1 % GEL    Apply 2 g topically 4 (four) times daily. Do not exceed 32 grams/day: do not to exceed 8 grams/day/single joint of upper extremities; do not to exceed 16 grams/day/single joint of lower extremities.  Please request refill of this medication from your PCP.    HYDROXYZINE PAMOATE (VISTARIL) 50 MG CAP    Take 1 capsule (50 mg total) by mouth every evening. For Sleep as needed.    IMIQUIMOD (ALDARA) 5 % CREAM    Apply topically 3 (three) times a week.    LISINOPRIL (PRINIVIL,ZESTRIL) 40 MG TABLET    Take 1 tablet (40 mg total) by mouth once daily. For High Blood Pressure    LORATADINE (CLARITIN) 10 MG TABLET    Take 1 tablet (10 mg total) by mouth daily as needed for Allergies.    MELOXICAM (MOBIC) 15 MG TABLET    Take 1 tablet (15 mg total) by mouth daily as needed (Knee Pain).    PODOFILOX (CONDYLOX) 0.5 % EXTERNAL SOLUTION    Apply topically.         Allergies  Review of patient's allergies indicates:  No Known Allergies    Physical Examination   There were no vitals filed for this visit.  Physical Exam  HENT:      Right Ear: Hearing normal.      Left Ear: Hearing normal.   Neurological:      Mental Status: He is alert and oriented to person, place, and time.   Psychiatric:         Mood and Affect: Mood normal.  "          Results     Lab Results   Component Value Date    WBC 7.44 12/31/2024    RBC 6.43 (H) 12/31/2024    HGB 14.3 12/31/2024    HCT 44.8 12/31/2024    MCV 69.7 (L) 12/31/2024    MCH 22.2 (L) 12/31/2024    MCHC 31.9 (L) 12/31/2024    RDW 17.2 (H) 12/31/2024     12/31/2024    MPV 9.5 12/31/2024     Sodium   Date Value Ref Range Status   12/31/2024 142 136 - 145 mmol/L Final     Potassium   Date Value Ref Range Status   12/31/2024 3.8 3.5 - 5.1 mmol/L Final     Chloride   Date Value Ref Range Status   12/31/2024 108 (H) 98 - 107 mmol/L Final     CO2   Date Value Ref Range Status   12/31/2024 26 23 - 31 mmol/L Final     Glucose   Date Value Ref Range Status   12/31/2024 114 82 - 115 mg/dL Final     Blood Urea Nitrogen   Date Value Ref Range Status   12/31/2024 14.5 8.4 - 25.7 mg/dL Final     Creatinine   Date Value Ref Range Status   12/31/2024 0.79 0.72 - 1.25 mg/dL Final     Calcium   Date Value Ref Range Status   12/31/2024 9.2 8.8 - 10.0 mg/dL Final     Protein Total   Date Value Ref Range Status   12/31/2024 7.3 5.8 - 7.6 gm/dL Final     Albumin   Date Value Ref Range Status   12/31/2024 3.6 3.4 - 4.8 g/dL Final     Bilirubin Total   Date Value Ref Range Status   12/31/2024 0.6 <=1.5 mg/dL Final     ALP   Date Value Ref Range Status   12/31/2024 74 40 - 150 unit/L Final     AST   Date Value Ref Range Status   12/31/2024 14 5 - 34 unit/L Final     ALT   Date Value Ref Range Status   12/31/2024 13 0 - 55 unit/L Final     Estimated GFR-Non    Date Value Ref Range Status   07/08/2018 >60 mL/min/1.73 m2 Final     Lab Results   Component Value Date    CHOL 197 12/31/2024     Lab Results   Component Value Date    HDL 47 12/31/2024     Lab Results   Component Value Date    TRIG 78 12/31/2024     Lab Results   Component Value Date    VLDL 16 12/31/2024     No components found for: "LDL"  Lab Results   Component Value Date    TSH 1.235 12/31/2024     Lab Results   Component Value Date    PHUR 6.0 " 12/31/2024    PROTEINUA 30 (A) 12/31/2024    GLUCUA Negative 12/31/2024    KETONESU Trace (A) 01/22/2021    OCCULTUA Trace (A) 12/31/2024    NITRITE Negative 12/31/2024    LEUKOCYTESUR Negative 12/31/2024     Lab Results   Component Value Date    HGBA1C 6.2 12/31/2024    HGBA1C 5.7 10/02/2023    HGBA1C 6.1 08/16/2022           Assessment         ICD-10-CM ICD-9-CM   1. Primary hypertension  I10 401.9       Plan      1. Primary hypertension  Assessment & Plan:  No home BP monitoring  Nurse visit for BP check in 3 weeks  Continue RX lisinopril 40 mg and amlodipine 10  mg daily   Low Sodium Diet (Dash Diet - less than 2 grams of sodium per day).  Maintain healthy weight with goal BMI <30. Exercise 30 minutes per day 5 days per week.             Future Appointments   Date Time Provider Department Center   8/19/2025  9:00 AM NURSE, [facility]            Signature:     [facility]  [address] [address]    Date of encounter: 7/28/25    Audio Only Telehealth Visit     The patient location is: home  The chief complaint leading to consultation is: Med eval   Visit type: Virtual visit with audio only (telephone)  Total time spent in medical discussion with patient: 18 minutes  Total time spent on date of the encounter:25 minutes       The reason for the audio only service rather than synchronous audio and video virtual visit was related to technical difficulties or patient preference/necessity.       Each patient to whom I provide medical services by telemedicine is:  (1) informed of the relationship between the physician and patient and the respective role of any other health care provider with respect to management of the patient; and (2) notified that they may decline to receive medical services by telemedicine and may withdraw from such care at any time. Patient verbally consented to receive this service via  voice-only telephone call.       This service was not originating from a related E/M service provided within the previous 7 days nor will  to an E/M service or procedure within the next 24 hours or my soonest available appointment.  Prevailing standard of care was able to be met in this audio-only visit.

## 2025-07-28 NOTE — TELEPHONE ENCOUNTER
Patient called requesting a letter from the doctor stating what the condition of his knees are like in order for him to obtain a ground level apartment in his new apartment complex. I did advise the Dr. Gonzales is out of office at this time and will be back tomorrow. I advised as soon as I hear anything back I will contact him/send fax. Please advise. Thanks.    FAX: [phone] ATTN: Clementina.

## 2025-07-28 NOTE — TELEPHONE ENCOUNTER
Spoke with patient. He informed me that Orthopedic department is assisting him with paperwork for handicap apartment. Noted

## 2025-07-28 NOTE — TELEPHONE ENCOUNTER
Copied from CRM #[id number]. Topic: General Inquiry - Patient Advice  >> Jul 28, 2025  9:56 AM Jose wrote:  Who Called: Xavier Harmon    Caller is requesting assistance/information from provider's office.    Symptoms (please be specific):     How long has patient had these symptoms:     List of preferred pharmacies on file (remove unneeded): [unfilled]  If different, enter pharmacy into here including location and phone number:        Preferred Method of Contact: Phone Call  Patient's Preferred Phone Number on File: [phone]   Best Call Back Number, if different:  Additional Information:  pt would like to speak with nurse about getting paperwork sent off so that he can turn it in for an handicap apartment ( pt is homeless)  pt stated that transportation  is running late , paper has to turn in today, I called the office was on hold for 12 min waiting

## 2025-07-28 NOTE — ASSESSMENT & PLAN NOTE
No home BP monitoring  Nurse visit for BP check in 3 weeks  Continue RX lisinopril 40 mg and amlodipine 10  mg daily   Low Sodium Diet (Dash Diet - less than 2 grams of sodium per day).  Maintain healthy weight with goal BMI <30. Exercise 30 minutes per day 5 days per week.

## 2025-07-29 NOTE — TELEPHONE ENCOUNTER
Patient has the diagnosis of moderate to severe bilateral knee osteoarthritis. ICD10 code of M17.0    Earl Gonzales D.O.  Sports Medicine Fellow

## 2025-08-18 RX ORDER — LIDOCAINE HYDROCHLORIDE 10 MG/ML
5 INJECTION, SOLUTION EPIDURAL; INFILTRATION; INTRACAUDAL; PERINEURAL
Status: CANCELLED | OUTPATIENT
Start: 2025-08-18 | End: 2025-08-18

## 2025-08-19 ENCOUNTER — CLINICAL SUPPORT (OUTPATIENT)
Dept: INTERNAL MEDICINE | Facility: CLINIC | Age: 60
End: 2025-08-19
Payer: MEDICAID

## 2025-08-19 ENCOUNTER — OFFICE VISIT (OUTPATIENT)
Dept: ORTHOPEDICS | Facility: CLINIC | Age: 60
End: 2025-08-19
Payer: MEDICAID

## 2025-08-19 VITALS
DIASTOLIC BLOOD PRESSURE: 77 MMHG | HEIGHT: 68 IN | WEIGHT: 210.13 LBS | HEART RATE: 84 BPM | BODY MASS INDEX: 31.85 KG/M2 | OXYGEN SATURATION: 99 % | SYSTOLIC BLOOD PRESSURE: 153 MMHG

## 2025-08-19 DIAGNOSIS — M17.0 PRIMARY OSTEOARTHRITIS OF BOTH KNEES: Primary | ICD-10-CM

## 2025-08-19 DIAGNOSIS — I10 PRIMARY HYPERTENSION: Primary | ICD-10-CM

## 2025-08-19 PROCEDURE — 99213 OFFICE O/P EST LOW 20 MIN: CPT | Mod: PBBFAC

## 2025-08-19 PROCEDURE — 20611 DRAIN/INJ JOINT/BURSA W/US: CPT | Mod: 50,PBBFAC

## 2025-08-19 PROCEDURE — 99211 OFF/OP EST MAY X REQ PHY/QHP: CPT | Mod: S$PBB,,, | Performed by: NURSE PRACTITIONER

## 2025-08-19 PROCEDURE — 99211 OFF/OP EST MAY X REQ PHY/QHP: CPT | Mod: PBBFAC,27

## 2025-08-19 RX ORDER — METHYLPREDNISOLONE ACETATE 40 MG/ML
40 INJECTION, SUSPENSION INTRA-ARTICULAR; INTRALESIONAL; INTRAMUSCULAR; SOFT TISSUE
Status: COMPLETED | OUTPATIENT
Start: 2025-08-19 | End: 2025-08-19

## 2025-08-19 RX ORDER — LIDOCAINE HYDROCHLORIDE 10 MG/ML
5 INJECTION, SOLUTION EPIDURAL; INFILTRATION; INTRACAUDAL; PERINEURAL
Status: COMPLETED | OUTPATIENT
Start: 2025-08-19 | End: 2025-08-19

## 2025-08-19 RX ADMIN — LIDOCAINE HYDROCHLORIDE 50 MG: 10 INJECTION, SOLUTION EPIDURAL; INFILTRATION; INTRACAUDAL; PERINEURAL at 09:08

## 2025-08-19 RX ADMIN — METHYLPREDNISOLONE ACETATE 40 MG: 40 INJECTION, SUSPENSION INTRA-ARTICULAR; INTRALESIONAL; INTRAMUSCULAR; SOFT TISSUE at 09:08

## 2025-08-20 ENCOUNTER — TELEPHONE (OUTPATIENT)
Dept: ORTHOPEDICS | Facility: CLINIC | Age: 60
End: 2025-08-20
Payer: MEDICAID

## 2025-08-21 DIAGNOSIS — I10 PRIMARY HYPERTENSION: ICD-10-CM

## 2025-08-21 RX ORDER — AMLODIPINE BESYLATE 10 MG/1
10 TABLET ORAL DAILY
Qty: 90 TABLET | Refills: 1 | Status: SHIPPED | OUTPATIENT
Start: 2025-08-21 | End: 2026-02-17

## (undated) DEVICE — APPLICATOR CHLORAPREP ORN 26ML

## (undated) DEVICE — GOWN POLY REINF BRTH SLV XL

## (undated) DEVICE — HANDLE MEDIVAC SUC YANK BLBOUS

## (undated) DEVICE — MANIFOLD 4 PORT

## (undated) DEVICE — SUT 2-0 ETHILON 18 FS

## (undated) DEVICE — GLOVE PROTEXIS LTX MICRO 6.5

## (undated) DEVICE — GAUZE SPONGE 4X4 12PLY

## (undated) DEVICE — GLOVE PROTEXIS BLUE LATEX 7

## (undated) DEVICE — Device

## (undated) DEVICE — HANDLE DEVON RIGID OR LIGHT

## (undated) DEVICE — ADHESIVE DERMABOND ADVANCED

## (undated) DEVICE — SYR 10CC LUER LOCK

## (undated) DEVICE — GLOVE PROTEXIS LTX MICRO  7

## (undated) DEVICE — SUT PDSII 4-0 PS-2 CLEAR MO

## (undated) DEVICE — GLOVE PROTEXIS BLUE LATEX 7.5

## (undated) DEVICE — SOL 9P NACL IRR PIC IL

## (undated) DEVICE — NDL HYPO REG 25G X 1 1/2

## (undated) DEVICE — SUT 2/0 30IN SILK BLK BRAI

## (undated) DEVICE — SUT 2-0 VICRYL / SH (J417)